# Patient Record
Sex: MALE | Race: WHITE | NOT HISPANIC OR LATINO | Employment: OTHER | URBAN - NONMETROPOLITAN AREA
[De-identification: names, ages, dates, MRNs, and addresses within clinical notes are randomized per-mention and may not be internally consistent; named-entity substitution may affect disease eponyms.]

---

## 2017-01-04 ENCOUNTER — OFFICE VISIT (OUTPATIENT)
Dept: UROLOGY | Facility: CLINIC | Age: 62
End: 2017-01-04

## 2017-01-04 VITALS — HEIGHT: 67 IN | TEMPERATURE: 97.3 F | WEIGHT: 172.8 LBS | BODY MASS INDEX: 27.12 KG/M2

## 2017-01-04 DIAGNOSIS — N21.0 BLADDER CALCULI: ICD-10-CM

## 2017-01-04 DIAGNOSIS — N32.0 BLADDER NECK CONTRACTURE: Primary | ICD-10-CM

## 2017-01-04 PROCEDURE — 99024 POSTOP FOLLOW-UP VISIT: CPT | Performed by: UROLOGY

## 2017-01-04 NOTE — PROGRESS NOTES
Mr. Sanchez is 61 y.o. male    CHIEF COMPLAINT: I am here after my procedure. I am urinating better    HPI  Location: Bladder neck  Quality:  Contracture  Severity: Severe  Duration: 3 months  Timing: Continuous.  Context: Post laser ablation prostate  Modifying factors: Status post transurethral incision bladder neck contracture and removal of bladder calculus  Associated signs or symptoms: Urinary stream has improved.  Some gross hematuria and urgency.      The following portions of the patient's history were reviewed and updated as appropriate: allergies, current medications, past family history, past medical history, past social history, past surgical history and problem list.    Review of Systems   Constitutional: Negative for chills and fever.   Gastrointestinal: Negative for abdominal pain, anal bleeding and blood in stool.   Genitourinary: Positive for urgency. Negative for flank pain and hematuria.         Current Outpatient Prescriptions:   •  acetaminophen (TYLENOL) 500 MG tablet, Take 500 mg by mouth As Needed for mild pain (1-3)., Disp: , Rfl:   •  amLODIPine (NORVASC) 5 MG tablet, Take 5 mg by mouth Daily., Disp: , Rfl:   •  cetirizine (zyrTEC) 10 MG tablet, Take 10 mg by mouth Daily., Disp: , Rfl:   •  omeprazole (priLOSEC) 20 MG capsule, Take 20 mg by mouth Daily., Disp: , Rfl:     Past Medical History   Diagnosis Date   • Arthritis    • Huggins esophagus    • Bladder stone    • Hypertension    • Kidney stones    • Melanoma    • Mitral valve prolapse    • PONV (postoperative nausea and vomiting)        Past Surgical History   Procedure Laterality Date   • Vasectomy     • Cystoscopy bladder stone lithotripsy  2011   • Cystoscopy w/ ureteral stent removal N/A 12/2/2016     Procedure: CYSTOLITHOLAPAXY WITH LASER and TRANURETHRAL INCISION OF BLADDER NECK CONTRACTURE;  Surgeon: Ryan Lewis MD;  Location: [facility] OR;  Service:    • Bladder neck contracture repair N/A 12/2/2016     Procedure: BLADDER  "NECK CONTRACTURE REPAIR;  Surgeon: Ryan Lewis MD;  Location: [facility];  Service:        Social History     Social History   • Marital status: Single     Spouse name: N/A   • Number of children: N/A   • Years of education: N/A     Social History Main Topics   • Smoking status: Never Smoker   • Smokeless tobacco: Never Used   • Alcohol use 12.6 oz/week     21 Cans of beer per week   • Drug use: No   • Sexual activity: Not Asked     Other Topics Concern   • None     Social History Narrative       Family History   Problem Relation Age of Onset   • No Known Problems Father    • No Known Problems Mother    • Colon cancer Maternal Grandfather        Visit Vitals   • Temp 97.3 °F (36.3 °C)   • Ht 67\" (170.2 cm)   • Wt 172 lb 12.8 oz (78.4 kg)   • BMI 27.06 kg/m2       Physical Exam   Constitutional: He is oriented to person, place, and time. He appears well-developed and well-nourished. No distress.   Pulmonary/Chest: Effort normal.   Abdominal: Soft. He exhibits no distension and no mass. There is no tenderness. There is no rebound and no guarding. No hernia.   Neurological: He is alert and oriented to person, place, and time.   Skin: Skin is warm and dry. He is not diaphoretic.   Psychiatric: He has a normal mood and affect.   Vitals reviewed.        Results for orders placed or performed during the hospital encounter of 12/02/16   Tissue Exam   Result Value Ref Range    Case Report       Surgical Pathology Report                         Case: [id number]                                  Authorizing Provider:  Ryan Lewis MD        Collected:           12/02/2016 08:15 AM          Ordering Location:     [facility]  Received:            12/02/2016 09:43 AM          Pathologist:           Belkis Joya MD                                                           Specimen:    Kidney, Bladder stones                                                                     Clinical Information       " "Pre-Op Diagnosis:    Retention of urine.    Post-Op Diagnosis:   Bladder stones.        Final Diagnosis       Urinary bladder, stones, removal :  Stone fragments, gross examination only  Submitted for analysis and report to follow as an addendum      Gross Description       Specimen #1 is received fresh, labeled with the patient's name, date of birth and medical record number and designated \"bladder stones\".  The specimen consists of multiple fragments of yellow-brown, irregularly shaped stones aggregating to 3.5 x 1.3 x 0.4 cm.  The stones are totally submitted for crystallographic calculus analysis, no histology is performed.  JBT/js      Embedded Images         Imaging Results (last 7 days)     ** No results found for the last 168 hours. **              Assessment and Plan  Ryan was seen today for bladder stone.    Diagnoses and all orders for this visit:    Bladder neck contracture    Bladder calculi  Needs a cystoscopy in two months to rule out repeat bladder neck contracture.   Improved stream.  High risk of recurrence.  We will need to follow uroflow and do serial cystoscopy.  He may ultimately require once daily catheterizations to keep himself open.      Ryan Lewis MD  01/04/17  9:41 AM    EMR Dragon/Transcription disclaimer:  Much of this encounter note is an electronic transcription/translation of spoken language to printed text. The electronic translation of spoken language may permit erroneous, or at times, nonsensical words or phrases to be inadvertently transcribed; although I have reviewed the note for such errors, some may still exist.       Cc: Dr. Marrero  "

## 2017-01-04 NOTE — MR AVS SNAPSHOT
Ryanmaximino Sanchez   2017 9:00 AM   Office Visit    Dept Phone:  [phone]   Encounter #:  [id number]    Provider:  Ryan Lewis MD   Department:  [facility] UROLOGY                Your Full Care Plan              Your Updated Medication List          This list is accurate as of: 17  9:45 AM.  Always use your most recent med list.                amLODIPine 5 MG tablet   Commonly known as:  NORVASC       cetirizine 10 MG tablet   Commonly known as:  zyrTEC       omeprazole 20 MG capsule   Commonly known as:  priLOSEC       TYLENOL 500 MG tablet   Generic drug:  acetaminophen               You Were Diagnosed With        Codes Comments    Bladder neck contracture    -  Primary ICD-10-CM: N32.0  ICD-9-CM: 596.0     Bladder calculi     ICD-10-CM: N21.0  ICD-9-CM: 594.1       Instructions     None    Patient Instructions History      Upcoming Appointments     Visit Type Date Time Department    POST-OP 2017  9:00 AM [facility]    IN OFFICE PROCEDURE 3/6/2017  9:20 AM [facility]      [facility] Signup     [facility] allows you to send messages to your doctor, view your test results, renew your prescriptions, schedule appointments, and more. To sign up, go to [employer] and click on the Sign Up Now link in the New User? box. Enter your [facility] Activation Code exactly as it appears below along with the last four digits of your Social Security Number and your Date of Birth () to complete the sign-up process. If you do not sign up before the expiration date, you must request a new code.    [facility] Activation Code: [email or web address]  Expires: 2017  9:44 AM    If you have questions, you can email [email or web address] or call [phone] to talk to our [facility] staff. Remember, [facility] is NOT to be used for urgent needs. For medical emergencies, dial 911.               Other Info from Your Visit           Your Appointments   "   Mar 06, 2017  9:20 AM CST   IN OFFICE PROCEDURE with Ryan Lewis MD   [facility] UROLOGY (--)    [address] [place] [id number] [phone]           Bring medication list, test results, and radiology films that apply.              Allergies     No Known Allergies      Reason for Visit     bladder stone           Vital Signs     Temperature Height Weight Body Mass Index Smoking Status       97.3 °F (36.3 °C) 67\" (170.2 cm) 172 lb 12.8 oz (78.4 kg) 27.06 kg/m2 Never Smoker       Problems and Diagnoses Noted     Bladder neck contracture    -  Primary    Bladder calculi            "

## 2017-01-04 NOTE — LETTER
January 7, 2017     Elmo Marrero MD  [address] [place] [address]    Patient: Ryan Sanchez   YOB: 1955   Date of Visit: 1/4/2017     Dear Dr. Janes MD:    Thank you for referring Ryan Sanchez to me for evaluation. Below are the relevant portions of my assessment and plan of care.    If you have questions, please do not hesitate to call me. I look forward to following Ryan along with you.         Sincerely,        Ryan Lewis MD        CC: No Recipients    Progress Notes:    Mr. Sanchez is 61 y.o. male    CHIEF COMPLAINT: I am here after my procedure. I am urinating better    HPI  Location: Bladder neck  Quality:  Contracture  Severity: Severe  Duration: 3 months  Timing: Continuous.  Context: Post laser ablation prostate  Modifying factors: Status post transurethral incision bladder neck contracture and removal of bladder calculus  Associated signs or symptoms: Urinary stream has improved.  Some gross hematuria and urgency.      The following portions of the patient's history were reviewed and updated as appropriate: allergies, current medications, past family history, past medical history, past social history, past surgical history and problem list.    Review of Systems   Constitutional: Negative for chills and fever.   Gastrointestinal: Negative for abdominal pain, anal bleeding and blood in stool.   Genitourinary: Positive for urgency. Negative for flank pain and hematuria.         Current Outpatient Prescriptions:   •  acetaminophen (TYLENOL) 500 MG tablet, Take 500 mg by mouth As Needed for mild pain (1-3)., Disp: , Rfl:   •  amLODIPine (NORVASC) 5 MG tablet, Take 5 mg by mouth Daily., Disp: , Rfl:   •  cetirizine (zyrTEC) 10 MG tablet, Take 10 mg by mouth Daily., Disp: , Rfl:   •  omeprazole (priLOSEC) 20 MG capsule, Take 20 mg by mouth Daily., Disp: , Rfl:     Past Medical History   Diagnosis Date   • Arthritis    • Huggins esophagus    • Bladder stone    • Hypertension    •  "Kidney stones    • Melanoma    • Mitral valve prolapse    • PONV (postoperative nausea and vomiting)        Past Surgical History   Procedure Laterality Date   • Vasectomy     • Cystoscopy bladder stone lithotripsy  2011   • Cystoscopy w/ ureteral stent removal N/A 12/2/2016     Procedure: CYSTOLITHOLAPAXY WITH LASER and TRANURETHRAL INCISION OF BLADDER NECK CONTRACTURE;  Surgeon: Ryan Lewis MD;  Location:  [facility] OR;  Service:    • Bladder neck contracture repair N/A 12/2/2016     Procedure: BLADDER NECK CONTRACTURE REPAIR;  Surgeon: Ryan Lewis MD;  Location:  [facility] OR;  Service:        Social History     Social History   • Marital status: Single     Spouse name: N/A   • Number of children: N/A   • Years of education: N/A     Social History Main Topics   • Smoking status: Never Smoker   • Smokeless tobacco: Never Used   • Alcohol use 12.6 oz/week     21 Cans of beer per week   • Drug use: No   • Sexual activity: Not Asked     Other Topics Concern   • None     Social History Narrative       Family History   Problem Relation Age of Onset   • No Known Problems Father    • No Known Problems Mother    • Colon cancer Maternal Grandfather        Visit Vitals   • Temp 97.3 °F (36.3 °C)   • Ht 67\" (170.2 cm)   • Wt 172 lb 12.8 oz (78.4 kg)   • BMI 27.06 kg/m2       Physical Exam   Constitutional: He is oriented to person, place, and time. He appears well-developed and well-nourished. No distress.   Pulmonary/Chest: Effort normal.   Abdominal: Soft. He exhibits no distension and no mass. There is no tenderness. There is no rebound and no guarding. No hernia.   Neurological: He is alert and oriented to person, place, and time.   Skin: Skin is warm and dry. He is not diaphoretic.   Psychiatric: He has a normal mood and affect.   Vitals reviewed.        Results for orders placed or performed during the hospital encounter of 12/02/16   Tissue Exam   Result Value Ref Range    Case Report       Surgical Pathology Report " "                        Case: [id number]                                  Authorizing Provider:  Ryan Lewis MD        Collected:           12/02/2016 08:15 AM          Ordering Location:     [facility] OR  Received:            12/02/2016 09:43 AM          Pathologist:           Belkis Joya MD                                                           Specimen:    Kidney, Bladder stones                                                                     Clinical Information       Pre-Op Diagnosis:    Retention of urine.    Post-Op Diagnosis:   Bladder stones.        Final Diagnosis       Urinary bladder, stones, removal :  Stone fragments, gross examination only  Submitted for analysis and report to follow as an addendum      Gross Description       Specimen #1 is received fresh, labeled with the patient's name, date of birth and medical record number and designated \"bladder stones\".  The specimen consists of multiple fragments of yellow-brown, irregularly shaped stones aggregating to 3.5 x 1.3 x 0.4 cm.  The stones are totally submitted for crystallographic calculus analysis, no histology is performed.  JBT/js      Embedded Images         Imaging Results (last 7 days)     ** No results found for the last 168 hours. **              Assessment and Plan  Ryan was seen today for bladder stone.    Diagnoses and all orders for this visit:    Bladder neck contracture    Bladder calculi  Needs a cystoscopy in two months to rule out repeat bladder neck contracture.   Improved stream.  High risk of recurrence.  We will need to follow uroflow and do serial cystoscopy.  He may ultimately require once daily catheterizations to keep himself open.      Ryan Lewis MD  01/04/17  9:41 AM    EMR Dragon/Transcription disclaimer:  Much of this encounter note is an electronic transcription/translation of spoken language to printed text. The electronic translation of spoken language may permit erroneous, or " at times, nonsensical words or phrases to be inadvertently transcribed; although I have reviewed the note for such errors, some may still exist.       Cc: Dr. Marrero

## 2017-04-06 ENCOUNTER — PROCEDURE VISIT (OUTPATIENT)
Dept: UROLOGY | Facility: CLINIC | Age: 62
End: 2017-04-06

## 2017-04-06 VITALS — TEMPERATURE: 96 F | BODY MASS INDEX: 26.37 KG/M2 | HEIGHT: 67 IN | WEIGHT: 168 LBS

## 2017-04-06 DIAGNOSIS — N32.0 BLADDER NECK CONTRACTURE: Primary | ICD-10-CM

## 2017-04-06 LAB
BILIRUB BLD-MCNC: NEGATIVE MG/DL
CLARITY, POC: CLEAR
COLOR UR: YELLOW
GLUCOSE UR STRIP-MCNC: NEGATIVE MG/DL
KETONES UR QL: NEGATIVE
LEUKOCYTE EST, POC: NEGATIVE
NITRITE UR-MCNC: NEGATIVE MG/ML
PH UR: 7 [PH] (ref 5–8)
PROT UR STRIP-MCNC: NEGATIVE MG/DL
RBC # UR STRIP: NEGATIVE /UL
SP GR UR: 1.02 (ref 1–1.03)
UROBILINOGEN UR QL: NORMAL

## 2017-04-06 PROCEDURE — 52000 CYSTOURETHROSCOPY: CPT | Performed by: UROLOGY

## 2017-04-06 PROCEDURE — 81003 URINALYSIS AUTO W/O SCOPE: CPT | Performed by: UROLOGY

## 2017-04-06 NOTE — PROGRESS NOTES
CC: I am here for the doctor to look at my bladder    Cystoscopy procedure note  Pre- operative diagnosis:  Bladder neck contracture s/p laser ablation of prostate     Post operative diagnosis:  Same    Procedure:  The patient was prepped and draped in a normal sterile fashion.  The urethra was anesthetized with 2% lidocaine jelly.  A flexible cystoscope was introduced per urethra.      Urethra:  bulbar urethral stricture    Bladder:  Normal mucosa, Bladder stone, moderate trabeculation and bladder neck contracture has healed nicely    Ureteral orifices:  Normal position bilaterally and Clear efflux bilaterally    Prostate:  Well resected prostatic urethra     Patient tolerated the procedure well    Complications: none    Blood loss: minimal    Diagnoses and all orders for this visit:    Bladder neck contracture  -     POC Urinalysis Dipstick, Automated  -     lidocaine (XYLOCAINE) 2 % jelly; Apply  topically As Needed for Mild Pain (1-3).        Follow up:    Routine follow up

## 2017-09-05 ENCOUNTER — OFFICE VISIT (OUTPATIENT)
Dept: GASTROENTEROLOGY | Facility: CLINIC | Age: 62
End: 2017-09-05

## 2017-09-05 VITALS
SYSTOLIC BLOOD PRESSURE: 140 MMHG | DIASTOLIC BLOOD PRESSURE: 86 MMHG | BODY MASS INDEX: 26.06 KG/M2 | WEIGHT: 166 LBS | OXYGEN SATURATION: 100 % | HEIGHT: 67 IN | HEART RATE: 72 BPM

## 2017-09-05 DIAGNOSIS — R09.89 GLOBUS SENSATION: Primary | ICD-10-CM

## 2017-09-05 DIAGNOSIS — K21.00 GASTROESOPHAGEAL REFLUX DISEASE WITH ESOPHAGITIS: ICD-10-CM

## 2017-09-05 DIAGNOSIS — R13.10 DIFFICULTY SWALLOWING SOLIDS: ICD-10-CM

## 2017-09-05 DIAGNOSIS — I10 HTN (HYPERTENSION), BENIGN: ICD-10-CM

## 2017-09-05 DIAGNOSIS — E11.9 CONTROLLED TYPE 2 DIABETES MELLITUS WITHOUT COMPLICATION, WITHOUT LONG-TERM CURRENT USE OF INSULIN (HCC): ICD-10-CM

## 2017-09-05 PROBLEM — R09.A2 GLOBUS SENSATION: Status: ACTIVE | Noted: 2017-09-05

## 2017-09-05 PROBLEM — K21.9 GASTROESOPHAGEAL REFLUX DISEASE: Status: ACTIVE | Noted: 2017-09-05

## 2017-09-05 PROCEDURE — 99214 OFFICE O/P EST MOD 30 MIN: CPT | Performed by: CLINICAL NURSE SPECIALIST

## 2017-09-05 RX ORDER — PANTOPRAZOLE SODIUM 40 MG/1
40 TABLET, DELAYED RELEASE ORAL DAILY
COMMUNITY
End: 2017-09-05 | Stop reason: ALTCHOICE

## 2017-09-05 RX ORDER — LISINOPRIL 2.5 MG/1
2.5 TABLET ORAL NIGHTLY
Status: ON HOLD | COMMUNITY
End: 2020-12-29 | Stop reason: SDUPTHER

## 2017-09-05 RX ORDER — DEXLANSOPRAZOLE 30 MG/1
60 CAPSULE, DELAYED RELEASE ORAL DAILY
Qty: 90 CAPSULE | Refills: 4 | Status: SHIPPED | OUTPATIENT
Start: 2017-09-05 | End: 2017-11-15

## 2017-09-05 NOTE — PROGRESS NOTES
Ryan Sanchez  1955 9/5/2017  Chief Complaint   Patient presents with   • Endo Recall   • Difficulty Swallowing     Subjective   HPI  Ryan Sanchez is a 62 y.o. male who presents with a complaint of ongoing persistent globus sensation like there is something in the back of his throat constant. It has been persistent for months. No nausea or vomiting. No melena. No BRBPR. He has reflux ongoing for years unstable with protonix he cannot take daily due to diarrhea. He does have regurgitation and some burning. This is daily. Moderate to severe at times. Certain foods may trigger it. No alleviating He has taken nexium in the past no reflief.  He is up to date on his colonoscopy.   Past Medical History:   Diagnosis Date   • Arthritis    • Huggins esophagus    • Bladder stone    • Hx of colonic polyps    • Hypertension    • Kidney stones    • Melanoma    • Mitral valve prolapse    • PONV (postoperative nausea and vomiting)      Past Surgical History:   Procedure Laterality Date   • BLADDER NECK CONTRACTURE REPAIR N/A 12/2/2016    Procedure: BLADDER NECK CONTRACTURE REPAIR;  Surgeon: Ryan Lewis MD;  Location:  [facility] OR;  Service:    • COLONOSCOPY W/ POLYPECTOMY  09/10/2015    Tubular adenoma at 30 cm repeat exam in 5 years   • CYSTOSCOPY BLADDER STONE LITHOTRIPSY  2011   • CYSTOSCOPY W/ URETERAL STENT REMOVAL N/A 12/2/2016    Procedure: CYSTOLITHOLAPAXY WITH LASER and TRANURETHRAL INCISION OF BLADDER NECK CONTRACTURE;  Surgeon: Ryan Lewis MD;  Location:  [facility] OR;  Service:    • ENDOSCOPY  07/14/2014    HH, Reflux esophagitis   • VASECTOMY       Outpatient Prescriptions Marked as Taking for the 9/5/17 encounter (Office Visit) with ARACELI Pabon   Medication Sig Dispense Refill   • acetaminophen (TYLENOL) 500 MG tablet Take 500 mg by mouth As Needed for mild pain (1-3).     • amLODIPine (NORVASC) 5 MG tablet Take 5 mg by mouth Daily.     • cetirizine (zyrTEC) 10 MG tablet Take 10 mg by mouth  Daily.     • lisinopril (PRINIVIL,ZESTRIL) 2.5 MG tablet Take 2.5 mg by mouth Daily.     • metFORMIN (GLUCOPHAGE) 500 MG tablet Take 500 mg by mouth 2 (Two) Times a Day With Meals.     • [DISCONTINUED] pantoprazole (PROTONIX) 40 MG EC tablet Take 40 mg by mouth Daily.       Current Facility-Administered Medications for the 9/5/17 encounter (Office Visit) with ARACELI Pabon   Medication Dose Route Frequency Provider Last Rate Last Dose   • lidocaine (XYLOCAINE) 2 % jelly   Topical PRN Ryan Lewis MD         No Known Allergies  Social History     Social History   • Marital status: Single     Spouse name: N/A   • Number of children: N/A   • Years of education: N/A     Occupational History   • Not on file.     Social History Main Topics   • Smoking status: Never Smoker   • Smokeless tobacco: Never Used   • Alcohol use 12.6 oz/week     21 Cans of beer per week   • Drug use: No   • Sexual activity: Not on file     Other Topics Concern   • Not on file     Social History Narrative     Family History   Problem Relation Age of Onset   • No Known Problems Father    • No Known Problems Mother    • Colon cancer Maternal Grandfather      Health Maintenance   Topic Date Due   • PNEUMOCOCCAL VACCINE (19-64 MEDIUM RISK) (1 of 1 - PPSV23) 08/07/1973   • TDAP/TD VACCINES (1 - Tdap) 08/07/1973   • HEPATITIS C SCREENING  04/06/2017   • COLONOSCOPY  04/06/2017   • ZOSTER VACCINE  04/06/2017   • INFLUENZA VACCINE  09/01/2017   • DIABETIC FOOT EXAM  09/05/2017   • HEMOGLOBIN A1C  09/05/2017   • DIABETIC EYE EXAM  09/05/2017   • URINE MICROALBUMIN  09/05/2017     Review of Systems   Constitutional: Negative for activity change, appetite change, chills, diaphoresis, fatigue, fever and unexpected weight change.   HENT: Negative for ear pain, hearing loss, mouth sores, sore throat, trouble swallowing and voice change.    Eyes: Negative.    Respiratory: Negative for cough, choking, shortness of breath and wheezing.   "  Cardiovascular: Negative for chest pain and palpitations.   Gastrointestinal: Negative for abdominal pain, blood in stool, constipation, diarrhea, nausea and vomiting.        GERD and globus sensation   Endocrine: Negative for cold intolerance and heat intolerance.   Genitourinary: Negative for decreased urine volume, dysuria, frequency, hematuria and urgency.   Musculoskeletal: Negative for back pain, gait problem and myalgias.   Skin: Negative for color change, pallor and rash.   Allergic/Immunologic: Negative for food allergies and immunocompromised state.   Neurological: Negative for dizziness, tremors, seizures, syncope, weakness, light-headedness, numbness and headaches.   Hematological: Negative for adenopathy. Does not bruise/bleed easily.   Psychiatric/Behavioral: Negative for agitation and confusion. The patient is not nervous/anxious.    All other systems reviewed and are negative.    Objective   Vitals:    09/05/17 1021   BP: 140/86   BP Location: Left arm   Patient Position: Sitting   Cuff Size: Adult   Pulse: 72   SpO2: 100%   Weight: 166 lb (75.3 kg)   Height: 67\" (170.2 cm)     Body mass index is 26 kg/(m^2).  Physical Exam   Constitutional: He is oriented to person, place, and time. He appears well-developed and well-nourished.   HENT:   Head: Normocephalic and atraumatic.   Eyes: Pupils are equal, round, and reactive to light.   Neck: Normal range of motion. Neck supple. No tracheal deviation present.   Cardiovascular: Normal rate, regular rhythm and normal heart sounds.  Exam reveals no gallop and no friction rub.    No murmur heard.  Pulmonary/Chest: Effort normal and breath sounds normal. No respiratory distress. He has no wheezes. He has no rales. He exhibits no tenderness.   Abdominal: Soft. Bowel sounds are normal. He exhibits no distension. There is no hepatosplenomegaly. There is no tenderness. There is no rigidity, no rebound and no guarding.   Musculoskeletal: Normal range of motion. He " exhibits no edema, tenderness or deformity.   Neurological: He is alert and oriented to person, place, and time. He has normal reflexes.   Skin: Skin is warm and dry. No rash noted. No pallor.   Psychiatric: He has a normal mood and affect. His behavior is normal. Judgment and thought content normal.     Assessment/Plan   Ryan was seen today for endo recall and difficulty swallowing.    Diagnoses and all orders for this visit:    Globus sensation    Difficulty swallowing solids    Gastroesophageal reflux disease with esophagitis  Comments:  unstable with Protonix  Orders:  -     dexlansoprazole (DEXILANT) 30 MG capsule; Take 2 capsules by mouth Daily. Give 60 mg capsule #90  -     Case Request; Standing  -     Implement Anesthesia Orders Day of Procedure; Standing  -     Obtain Informed Consent; Standing  -     Case Request    HTN (hypertension), benign    Controlled type 2 diabetes mellitus without complication, without long-term current use of insulin      ESOPHAGOGASTRODUODENOSCOPY WITH ANESTHESIA (N/A)  EMR Dragon/transcription disclaimer: Much of this encounter note is electronic transcription/translation of spoken language to printed text. The electronic translation of spoken language may be erroneous, or at times, nonsensical words or phrases may be inadvertently transcribed. Although I have reviewed the note for such errors, some may still exist.  Body mass index is 26 kg/(m^2).  Return if symptoms worsen or fail to improve.  There are no Patient Instructions on file for this visit.    All risks, benefits, alternatives, and indications of colonoscopy and/or Endoscopy procedure have been discussed with the patient. Risks to include perforation of the colon requiring possible surgery or colostomy, risk of bleeding from biopsies or removal of colon tissue, possibility of missing a colon polyp or cancer, or adverse drug reaction.  Benefits to include the diagnosis and management of disease of the colon and  rectum. Alternatives to include barium enema, radiographic evaluation, lab testing or no intervention. Pt verbalizes understanding and agrees.

## 2017-09-06 PROBLEM — K21.00 GASTROESOPHAGEAL REFLUX DISEASE WITH ESOPHAGITIS: Status: ACTIVE | Noted: 2017-09-06

## 2017-10-05 ENCOUNTER — ANESTHESIA (OUTPATIENT)
Dept: GASTROENTEROLOGY | Facility: HOSPITAL | Age: 62
End: 2017-10-05

## 2017-10-05 ENCOUNTER — HOSPITAL ENCOUNTER (OUTPATIENT)
Facility: HOSPITAL | Age: 62
Setting detail: HOSPITAL OUTPATIENT SURGERY
Discharge: HOME | End: 2017-10-05
Attending: INTERNAL MEDICINE | Admitting: INTERNAL MEDICINE

## 2017-10-05 ENCOUNTER — ANESTHESIA EVENT (OUTPATIENT)
Dept: GASTROENTEROLOGY | Facility: HOSPITAL | Age: 62
End: 2017-10-05

## 2017-10-05 ENCOUNTER — PREP FOR SURGERY (OUTPATIENT)
Dept: GASTROENTEROLOGY | Facility: CLINIC | Age: 62
End: 2017-10-05

## 2017-10-05 VITALS
HEIGHT: 67 IN | DIASTOLIC BLOOD PRESSURE: 81 MMHG | TEMPERATURE: 98.1 F | SYSTOLIC BLOOD PRESSURE: 120 MMHG | BODY MASS INDEX: 25.43 KG/M2 | OXYGEN SATURATION: 98 % | HEART RATE: 58 BPM | RESPIRATION RATE: 15 BRPM | WEIGHT: 162 LBS

## 2017-10-05 DIAGNOSIS — K21.00 GASTROESOPHAGEAL REFLUX DISEASE WITH ESOPHAGITIS: ICD-10-CM

## 2017-10-05 DIAGNOSIS — K20.90 ESOPHAGITIS: Primary | ICD-10-CM

## 2017-10-05 LAB — GLUCOSE BLDC GLUCOMTR-MCNC: 105 MG/DL (ref 70–130)

## 2017-10-05 PROCEDURE — 25010000002 PROPOFOL 10 MG/ML EMULSION: Performed by: NURSE ANESTHETIST, CERTIFIED REGISTERED

## 2017-10-05 PROCEDURE — 43235 EGD DIAGNOSTIC BRUSH WASH: CPT | Performed by: INTERNAL MEDICINE

## 2017-10-05 PROCEDURE — 82962 GLUCOSE BLOOD TEST: CPT

## 2017-10-05 RX ORDER — PROPOFOL 10 MG/ML
VIAL (ML) INTRAVENOUS AS NEEDED
Status: DISCONTINUED | OUTPATIENT
Start: 2017-10-05 | End: 2017-10-05 | Stop reason: SURG

## 2017-10-05 RX ORDER — PANTOPRAZOLE SODIUM 40 MG/1
40 TABLET, DELAYED RELEASE ORAL 2 TIMES DAILY
Qty: 180 TABLET | Refills: 3 | Status: ON HOLD | OUTPATIENT
Start: 2017-10-05 | End: 2020-12-26

## 2017-10-05 RX ORDER — SODIUM CHLORIDE 9 MG/ML
500 INJECTION, SOLUTION INTRAVENOUS CONTINUOUS PRN
Status: DISCONTINUED | OUTPATIENT
Start: 2017-10-05 | End: 2017-10-05 | Stop reason: HOSPADM

## 2017-10-05 RX ORDER — SODIUM CHLORIDE 0.9 % (FLUSH) 0.9 %
3 SYRINGE (ML) INJECTION AS NEEDED
Status: DISCONTINUED | OUTPATIENT
Start: 2017-10-05 | End: 2017-10-05 | Stop reason: HOSPADM

## 2017-10-05 RX ORDER — LIDOCAINE HYDROCHLORIDE 20 MG/ML
INJECTION, SOLUTION INFILTRATION; PERINEURAL AS NEEDED
Status: DISCONTINUED | OUTPATIENT
Start: 2017-10-05 | End: 2017-10-05 | Stop reason: SURG

## 2017-10-05 RX ADMIN — SODIUM CHLORIDE: 0.9 INJECTION, SOLUTION INTRAVENOUS at 13:15

## 2017-10-05 RX ADMIN — LIDOCAINE HYDROCHLORIDE 40 MG: 20 INJECTION, SOLUTION INFILTRATION; PERINEURAL at 13:19

## 2017-10-05 RX ADMIN — PROPOFOL 50 MG: 10 INJECTION, EMULSION INTRAVENOUS at 13:21

## 2017-10-05 RX ADMIN — PROPOFOL 50 MG: 10 INJECTION, EMULSION INTRAVENOUS at 13:19

## 2017-10-05 NOTE — PLAN OF CARE
Problem: Patient Care Overview (Adult)  Goal: Plan of Care Review  Outcome: Outcome(s) achieved Date Met:  10/05/17    10/05/17 1338   Patient Care Overview   Progress no change   Outcome Evaluation   Outcome Summary/Follow up Plan meets discharge criteria   Coping/Psychosocial Response Interventions   Plan Of Care Reviewed With patient;spouse         Problem: GI Endoscopy (Adult)  Goal: Signs and Symptoms of Listed Potential Problems Will be Absent or Manageable (GI Endoscopy)  Outcome: Outcome(s) achieved Date Met:  10/05/17    10/05/17 1338   GI Endoscopy   Problems Assessed (GI Endoscopy) all   Problems Present (GI Endoscopy) none

## 2017-10-05 NOTE — PLAN OF CARE
Problem: Patient Care Overview (Adult)  Goal: Plan of Care Review  Outcome: Ongoing (interventions implemented as appropriate)    10/05/17 1322   Patient Care Overview   Progress improving   Outcome Evaluation   Outcome Summary/Follow up Plan no noted problems

## 2017-10-05 NOTE — INTERVAL H&P NOTE
H&P reviewed. The patient was examined and there are no changes to the H&P.    The following major R/B/A were discussed with the patient, however the list is not all inclusive . Risk:  Bleeding (immediate and delayed), perforation (rupture or tear), reaction to medication, missed lesion/cancer, pain during the procedure, infection, need for surgery, need for ostomy, need for mechanical ventilation (breathing machine), death.  Benefits: removal of polyp/tissue, burn/clip/or inject to stop bleeding, removal of foreign body, dilate any stricture.  Alternatives: Xray or CT, surgery, do nothing with associated risk   The patient was given time to ask question and received explanation, and agrees to proceed as per History and Physical.   No guarantee given or expressed.

## 2017-10-05 NOTE — ANESTHESIA PREPROCEDURE EVALUATION
Anesthesia Evaluation     Patient summary reviewed and Nursing notes reviewed   no history of anesthetic complications:  NPO Solid Status: > 8 hours  NPO Liquid Status: > 8 hours     Airway   Mallampati: I  TM distance: >3 FB  Neck ROM: full  no difficulty expected  Dental      Pulmonary     breath sounds clear to auscultation  (-) asthma, sleep apnea, not a smoker  Cardiovascular   Exercise tolerance: good (4-7 METS)    ECG reviewed  Rhythm: regular  Rate: normal    (+) hypertension, valvular problems/murmurs MVP,   (-) CAD, angina      Neuro/Psych  (-) seizures, TIA, CVA  GI/Hepatic/Renal/Endo    (+)  GERD, diabetes mellitus type 2 well controlled,   (-) liver disease, no renal disease    ROS Comment: H/o esophageal stricture    Musculoskeletal     Abdominal    Substance History      OB/GYN          Other   (+) arthritis                                     Anesthesia Plan    ASA 2     general     intravenous induction   Anesthetic plan and risks discussed with patient.

## 2017-10-05 NOTE — ANESTHESIA POSTPROCEDURE EVALUATION
"Patient: Ryan Sanchez    Procedure Summary     Date Anesthesia Start Anesthesia Stop Room / Location    10/05/17 1315 1322 [facility] ENDOSCOPY 5 / [facility]       Procedure Diagnosis Surgeon Provider    ESOPHAGOGASTRODUODENOSCOPY WITH ANESTHESIA (N/A Esophagus) Gastroesophageal reflux disease with esophagitis  (Gastroesophageal reflux disease with esophagitis [K21.0]) MD Juan Daniel Hall CRNA          Anesthesia Type: general  Last vitals  BP        Temp        Pulse       Resp        SpO2          Post Anesthesia Care and Evaluation    Patient location during evaluation: PACU  Patient participation: complete - patient participated  Level of consciousness: awake and alert  Pain score: 0  Pain management: adequate  Airway patency: patent  Anesthetic complications: No anesthetic complications  PONV Status: none  Cardiovascular status: hemodynamically stable and acceptable  Respiratory status: acceptable and unassisted  Hydration status: acceptable    Comments: Blood pressure 133/93, pulse 72, temperature 98.1 °F (36.7 °C), temperature source Temporal Artery , resp. rate 18, height 67\" (170.2 cm), weight 162 lb (73.5 kg), SpO2 99 %.        "

## 2017-10-06 PROBLEM — K20.90 ESOPHAGITIS: Status: ACTIVE | Noted: 2017-10-06

## 2017-11-14 ENCOUNTER — TELEPHONE (OUTPATIENT)
Dept: PODIATRY | Facility: CLINIC | Age: 62
End: 2017-11-14

## 2017-11-14 NOTE — TELEPHONE ENCOUNTER
Left message for Mr. Sanchez reminding him of his appointment tomorrow at 10 am and advised him if he has any questions or needed to reschedule for any reason to please call the office at [phone].

## 2017-11-15 ENCOUNTER — OFFICE VISIT (OUTPATIENT)
Dept: PODIATRY | Facility: CLINIC | Age: 62
End: 2017-11-15

## 2017-11-15 VITALS
HEART RATE: 70 BPM | HEIGHT: 67 IN | BODY MASS INDEX: 25.74 KG/M2 | SYSTOLIC BLOOD PRESSURE: 148 MMHG | DIASTOLIC BLOOD PRESSURE: 96 MMHG | OXYGEN SATURATION: 98 % | WEIGHT: 164 LBS

## 2017-11-15 DIAGNOSIS — E11.9 CONTROLLED TYPE 2 DIABETES MELLITUS WITHOUT COMPLICATION, WITHOUT LONG-TERM CURRENT USE OF INSULIN (HCC): Primary | ICD-10-CM

## 2017-11-15 PROCEDURE — 99203 OFFICE O/P NEW LOW 30 MIN: CPT | Performed by: PODIATRIST

## 2017-11-15 NOTE — PROGRESS NOTES
[facility] - PODIATRY    Today's Date: 11/15/17    Patient Name: Ryan Sanchez  MRN: [id number]  CSN: [id number]  PCP: Elmo Marrero MD  Referring Provider: Elmo Marrero MD    SUBJECTIVE     Chief Complaint   Patient presents with   • Right Foot - Numbness     Patient denies any foot pain today. He is complaining of numbness in the right foot x 4 months. PCP 3 weeks.    • Diabetes     BG was 240 last Wednesday. Patient states he checks BG once weekly.      HPI: Ryan Sanchez, a 62 y.o.male, comes to clinic as a(n) new patient presenting for diabetic foot exam. Patient has h/o arthritis, akers's esophagus, DM, HTN, kidney stones, melanoma, mitral valve prolapse. States that he was diagnosed with DM last year. He feels numbness in his right foot but also relates sciatic pain which is treated by chiropractor. Denies wounds or sores. Relates that 30 years ago he fell from a roof 30 feet and crushed his right foot. Patient is NIDDM with last stated BG level of 240mg/dl. Denies pain today. Denies previous treatment. Denies any constitutional symptoms. No other pedal complaints at this time.    Past Medical History:   Diagnosis Date   • Arthritis    • Akers esophagus    • Bladder stone    • Diabetes mellitus    • Hx of colonic polyps    • Hypertension    • Kidney stones    • Melanoma    • Mitral valve prolapse    • PONV (postoperative nausea and vomiting)      Past Surgical History:   Procedure Laterality Date   • BLADDER NECK CONTRACTURE REPAIR N/A 12/2/2016    Procedure: BLADDER NECK CONTRACTURE REPAIR;  Surgeon: Ryan Lewis MD;  Location: [facility];  Service:    • COLONOSCOPY W/ POLYPECTOMY  09/10/2015    Tubular adenoma at 30 cm repeat exam in 5 years   • CYSTOSCOPY BLADDER STONE LITHOTRIPSY  2011   • CYSTOSCOPY W/ URETERAL STENT REMOVAL N/A 12/2/2016    Procedure: CYSTOLITHOLAPAXY WITH LASER and TRANURETHRAL INCISION OF BLADDER NECK CONTRACTURE;  Surgeon: Ryan Lewis MD;   Location: [facility] OR;  Service:    • ENDOSCOPY  07/14/2014    HH, Reflux esophagitis   • ENDOSCOPY N/A 10/5/2017    Procedure: ESOPHAGOGASTRODUODENOSCOPY WITH ANESTHESIA;  Surgeon: Joshua Zuleta MD;  Location: [facility] ENDOSCOPY;  Service:    • VASECTOMY       Family History   Problem Relation Age of Onset   • No Known Problems Father    • No Known Problems Mother    • Colon cancer Maternal Grandfather      Social History     Social History   • Marital status: Single     Spouse name: N/A   • Number of children: N/A   • Years of education: N/A     Occupational History   • Not on file.     Social History Main Topics   • Smoking status: Never Smoker   • Smokeless tobacco: Never Used   • Alcohol use Yes      Comment: daily   • Drug use: No   • Sexual activity: Defer     Other Topics Concern   • Not on file     Social History Narrative     No Known Allergies  Current Outpatient Prescriptions   Medication Sig Dispense Refill   • acetaminophen (TYLENOL) 500 MG tablet Take 500 mg by mouth As Needed for mild pain (1-3).     • amLODIPine (NORVASC) 5 MG tablet Take 5 mg by mouth Daily.     • lisinopril (PRINIVIL,ZESTRIL) 2.5 MG tablet Take 2.5 mg by mouth Daily.     • loratadine-pseudoephedrine (CLARITIN-D 24 HOUR)  MG per 24 hr tablet Take 1 tablet by mouth Daily.     • metFORMIN (GLUCOPHAGE) 500 MG tablet Take 500 mg by mouth 2 (Two) Times a Day With Meals.     • pantoprazole (PROTONIX) 40 MG EC tablet Take 1 tablet by mouth 2 (Two) Times a Day. 180 tablet 3     Current Facility-Administered Medications   Medication Dose Route Frequency Provider Last Rate Last Dose   • lidocaine (XYLOCAINE) 2 % jelly   Topical PRN Ryan Lewis MD         Review of Systems   Constitutional: Negative for chills and fever.   HENT: Negative for congestion.    Respiratory: Negative for shortness of breath.    Cardiovascular: Negative for chest pain and leg swelling.   Gastrointestinal: Negative for constipation, diarrhea, nausea and  vomiting.   Musculoskeletal:        Foot pain   Skin: Negative for wound.   Neurological: Positive for numbness.       OBJECTIVE     Vitals:    11/15/17 1013   BP: 148/96   Pulse: 70   SpO2: 98%       PHYSICAL EXAM  GEN:   A&Ox3, NAD. Pt presents to clinic ambulating without assistance and wearing Casual Shoes.      NEURO:   Protective sensation intact to 10/10 sites Right foot, 10/10 site Left foot using [place] monofilament  Light touch sensation diminished  No Tinel's or [place] sign.    VASC:  Skin temperature Warm to Warm proximal to distal angie  DP pulses 2/4 Right, 2/4 Left  PT pulses 2/4 Right, 2/4 Left  CFT 3 sec angie  Pedal hair growth present  no edema noted angie  Varicosities absent angie    MUSC/SKEL:  Muscle Strength Right foot Dorsiflexors 5/5, Plantarflexors 5/5, Evertors 5/5, Invertors 5/5  Muscle Strength Left foot Dorsiflexors 5/5, Plantarflexors 5/5, Evertors 5/5, Invertors 5/5  ROM of the 1st MTP is full without pain or crepitus  ROM of the MTJ is full without pain or crepitus    ROM of the STJ is full without pain or crepitus    ROM of the ankle joint is full without pain or crepitus    No POP during exam  Rectus foot type   Gait pattern: Normal  No gross pedal musculoskeletal deformities noted.     DERM:  Pedal nails x10 are within normal limits of length and thickness  Webspaces are Clean, Dry, and Intact  Skin is normal in turgor and texture with no open wounds or sores appreciated.      RADIOLOGY/NUCLEAR:  No results found.    LABORATORY/CULTURE RESULTS:      PATHOLOGY RESULTS:       ASSESSMENT/PLAN     Ryan was seen today for diabetes and numbness.    Diagnoses and all orders for this visit:    Controlled type 2 diabetes mellitus without complication, without long-term current use of insulin      Comprehensive lower extremity examination and evaluation was performed.  Discussed findings and treatment plan including risks, benefits, and treatment options with patient in detail.  Patient agreed with treatment plan.  Patient may maintain nails and calluses at home utilizing emery board or pumice stone between visits as needed  Reviewed at home diabetic foot care including daily foot checks   At this time patient is mostly NV intact and at low risk for DM foot complications  An After Visit Summary was printed and given to the patient at discharge, including (if requested) any available informative/educational handouts regarding diagnosis, treatment, or medications. All questions were answered to patient/family satisfaction. Should symptoms fail to improve or worsen they agree to call or return to clinic or to go to the Emergency Department. Discussed the importance of following up with any needed screening tests/labs/specialist appointments and any requested follow-up recommended by me today. Importance of maintaining follow-up discussed and patient accepts that missed appointments can delay diagnosis and potentially lead to worsening of conditions.  Return in about 6 months (around 5/15/2018)., or sooner if acute issues arise.        This document has been electronically signed by Mayo Marrero DPM on November 15, 2017 10:29 AM

## 2017-11-15 NOTE — PATIENT INSTRUCTIONS
Diabetes and Foot Care  Diabetes may cause you to have problems because of poor blood supply (circulation) to your feet and legs. This may cause the skin on your feet to become thinner, break easier, and heal more slowly. Your skin may become dry, and the skin may peel and crack. You may also have nerve damage in your legs and feet causing decreased feeling in them. You may not notice minor injuries to your feet that could lead to infections or more serious problems. Taking care of your feet is one of the most important things you can do for yourself.  HOME CARE INSTRUCTIONS  · Wear shoes at all times, even in the house. Do not go barefoot. Bare feet are easily injured.  · Check your feet daily for blisters, cuts, and redness. If you cannot see the bottom of your feet, use a mirror or ask someone for help.  · Wash your feet with warm water (do not use hot water) and mild soap. Then pat your feet and the areas between your toes until they are completely dry. Do not soak your feet as this can dry your skin.  · Apply a moisturizing lotion or petroleum jelly (that does not contain alcohol and is unscented) to the skin on your feet and to dry, brittle toenails. Do not apply lotion between your toes.  · Trim your toenails straight across. Do not dig under them or around the cuticle. File the edges of your nails with an emery board or nail file.  · Do not cut corns or calluses or try to remove them with medicine.  · Wear clean socks or stockings every day. Make sure they are not too tight. Do not wear knee-high stockings since they may decrease blood flow to your legs.  · Wear shoes that fit properly and have enough cushioning. To break in new shoes, wear them for just a few hours a day. This prevents you from injuring your feet. Always look in your shoes before you put them on to be sure there are no objects inside.  · Do not cross your legs. This may decrease the blood flow to your feet.  · If you find a minor scrape,  cut, or break in the skin on your feet, keep it and the skin around it clean and dry. These areas may be cleansed with mild soap and water. Do not cleanse the area with peroxide, alcohol, or iodine.  · When you remove an adhesive bandage, be sure not to damage the skin around it.  · If you have a wound, look at it several times a day to make sure it is healing.  · Do not use heating pads or hot water bottles. They may burn your skin. If you have lost feeling in your feet or legs, you may not know it is happening until it is too late.  · Make sure your health care provider performs a complete foot exam at least annually or more often if you have foot problems. Report any cuts, sores, or bruises to your health care provider immediately.  SEEK MEDICAL CARE IF:   · You have an injury that is not healing.  · You have cuts or breaks in the skin.  · You have an ingrown nail.  · You notice redness on your legs or feet.  · You feel burning or tingling in your legs or feet.  · You have pain or cramps in your legs and feet.  · Your legs or feet are numb.  · Your feet always feel cold.  SEEK IMMEDIATE MEDICAL CARE IF:   · There is increasing redness, swelling, or pain in or around a wound.  · There is a red line that goes up your leg.  · Pus is coming from a wound.  · You develop a fever or as directed by your health care provider.  · You notice a bad smell coming from an ulcer or wound.     This information is not intended to replace advice given to you by your health care provider. Make sure you discuss any questions you have with your health care provider.     Document Released: 12/15/2001 Document Revised: 04/10/2017 Document Reviewed: 05/27/2014  [employer] Patient Education ©2017 [employer]

## 2018-01-02 ENCOUNTER — HOSPITAL ENCOUNTER (OUTPATIENT)
Facility: HOSPITAL | Age: 63
Setting detail: HOSPITAL OUTPATIENT SURGERY
Discharge: HOME | End: 2018-01-02
Attending: INTERNAL MEDICINE | Admitting: INTERNAL MEDICINE

## 2018-01-02 ENCOUNTER — ANESTHESIA EVENT (OUTPATIENT)
Dept: GASTROENTEROLOGY | Facility: HOSPITAL | Age: 63
End: 2018-01-02

## 2018-01-02 ENCOUNTER — ANESTHESIA (OUTPATIENT)
Dept: GASTROENTEROLOGY | Facility: HOSPITAL | Age: 63
End: 2018-01-02

## 2018-01-02 VITALS
OXYGEN SATURATION: 96 % | BODY MASS INDEX: 26.84 KG/M2 | DIASTOLIC BLOOD PRESSURE: 82 MMHG | TEMPERATURE: 97.2 F | RESPIRATION RATE: 18 BRPM | HEART RATE: 68 BPM | SYSTOLIC BLOOD PRESSURE: 131 MMHG | HEIGHT: 67 IN | WEIGHT: 171 LBS

## 2018-01-02 DIAGNOSIS — K20.90 ESOPHAGITIS: ICD-10-CM

## 2018-01-02 LAB — GLUCOSE BLDC GLUCOMTR-MCNC: 145 MG/DL (ref 70–130)

## 2018-01-02 PROCEDURE — 25010000002 PROPOFOL 10 MG/ML EMULSION: Performed by: NURSE ANESTHETIST, CERTIFIED REGISTERED

## 2018-01-02 PROCEDURE — 43239 EGD BIOPSY SINGLE/MULTIPLE: CPT | Performed by: INTERNAL MEDICINE

## 2018-01-02 PROCEDURE — 82962 GLUCOSE BLOOD TEST: CPT

## 2018-01-02 PROCEDURE — 88305 TISSUE EXAM BY PATHOLOGIST: CPT | Performed by: INTERNAL MEDICINE

## 2018-01-02 RX ORDER — SODIUM CHLORIDE 9 MG/ML
100 INJECTION, SOLUTION INTRAVENOUS CONTINUOUS
Status: CANCELLED | OUTPATIENT
Start: 2018-01-02

## 2018-01-02 RX ORDER — SODIUM CHLORIDE 0.9 % (FLUSH) 0.9 %
1-10 SYRINGE (ML) INJECTION AS NEEDED
Status: CANCELLED | OUTPATIENT
Start: 2018-01-02

## 2018-01-02 RX ORDER — SODIUM CHLORIDE 0.9 % (FLUSH) 0.9 %
3 SYRINGE (ML) INJECTION AS NEEDED
Status: DISCONTINUED | OUTPATIENT
Start: 2018-01-02 | End: 2018-01-02 | Stop reason: HOSPADM

## 2018-01-02 RX ORDER — PROPOFOL 10 MG/ML
VIAL (ML) INTRAVENOUS AS NEEDED
Status: DISCONTINUED | OUTPATIENT
Start: 2018-01-02 | End: 2018-01-02 | Stop reason: SURG

## 2018-01-02 RX ORDER — LIDOCAINE HYDROCHLORIDE 20 MG/ML
INJECTION, SOLUTION INFILTRATION; PERINEURAL AS NEEDED
Status: DISCONTINUED | OUTPATIENT
Start: 2018-01-02 | End: 2018-01-02 | Stop reason: SURG

## 2018-01-02 RX ORDER — SODIUM CHLORIDE 9 MG/ML
500 INJECTION, SOLUTION INTRAVENOUS CONTINUOUS PRN
Status: DISCONTINUED | OUTPATIENT
Start: 2018-01-02 | End: 2018-01-02 | Stop reason: HOSPADM

## 2018-01-02 RX ADMIN — SODIUM CHLORIDE 500 ML: 9 INJECTION, SOLUTION INTRAVENOUS at 07:43

## 2018-01-02 RX ADMIN — LIDOCAINE HYDROCHLORIDE 0.5 ML: 10 INJECTION, SOLUTION EPIDURAL; INFILTRATION; INTRACAUDAL; PERINEURAL at 07:43

## 2018-01-02 RX ADMIN — PROPOFOL 100 MG: 10 INJECTION, EMULSION INTRAVENOUS at 08:58

## 2018-01-02 RX ADMIN — LIDOCAINE HYDROCHLORIDE 50 MG: 20 INJECTION, SOLUTION INFILTRATION; PERINEURAL at 08:58

## 2018-01-02 NOTE — PLAN OF CARE
Problem: Patient Care Overview (Adult)  Goal: Adult Individualization and Mutuality  Outcome: Outcome(s) achieved Date Met: 01/02/18 01/02/18 0727   Individualization   Patient Specific Preferences NONE

## 2018-01-02 NOTE — PLAN OF CARE
Problem: GI Endoscopy (Adult)  Goal: Signs and Symptoms of Listed Potential Problems Will be Absent or Manageable (GI Endoscopy)  Outcome: Outcome(s) achieved Date Met: 01/02/18 01/02/18 0907   GI Endoscopy   Problems Assessed (GI Endoscopy) all   Problems Present (GI Endoscopy) none

## 2018-01-02 NOTE — PLAN OF CARE
Problem: Patient Care Overview (Adult)  Goal: Plan of Care Review  Outcome: Outcome(s) achieved Date Met: 01/02/18 01/02/18 0907   Coping/Psychosocial Response Interventions   Plan Of Care Reviewed With patient;spouse   Patient Care Overview   Progress improving   Outcome Evaluation   Outcome Summary/Follow up Plan discharge criteria met

## 2018-01-02 NOTE — PLAN OF CARE
Problem: Patient Care Overview (Adult)  Goal: Plan of Care Review  Outcome: Ongoing (interventions implemented as appropriate)   01/02/18 0859   Coping/Psychosocial Response Interventions   Plan Of Care Reviewed With patient   Patient Care Overview   Progress no change   Outcome Evaluation   Outcome Summary/Follow up Plan tolerating well       Problem: GI Endoscopy (Adult)  Goal: Signs and Symptoms of Listed Potential Problems Will be Absent or Manageable (GI Endoscopy)  Outcome: Ongoing (interventions implemented as appropriate)

## 2018-01-02 NOTE — ANESTHESIA PREPROCEDURE EVALUATION
Anesthesia Evaluation     history of anesthetic complications: PONV  NPO Solid Status: > 8 hours  NPO Liquid Status: > 8 hours     Airway   Mallampati: III  TM distance: >3 FB  Neck ROM: full  no difficulty expected  Dental - normal exam     Pulmonary - normal exam    breath sounds clear to auscultation  (-) asthma, recent URI, sleep apnea, not a smoker  Cardiovascular - normal exam  Exercise tolerance: excellent (>7 METS)    Rhythm: regular  Rate: normal    (+) hypertension well controlled, valvular problems/murmurs (diagnosed 20 years ago, no issues) MVP,   (-) pacemaker, past MI, angina, cardiac stents, CABG      Neuro/Psych  (-) seizures, TIA, CVA  GI/Hepatic/Renal/Endo    (+)  GERD well controlled, diabetes mellitus,   (-) liver disease, no renal disease, hypothyroidism, hyperthyroidism    Musculoskeletal     Abdominal    Substance History      OB/GYN          Other                                                Anesthesia Plan    ASA 2     general   total IV anesthesia  intravenous induction   Anesthetic plan and risks discussed with patient.

## 2018-01-02 NOTE — ANESTHESIA POSTPROCEDURE EVALUATION
Patient: Ryan Sanchez    Procedure Summary     Date Anesthesia Start Anesthesia Stop Room / Location    01/02/18 0856 0904 [facility] ENDOSCOPY 2 / [facility]OS[facility]       Procedure Diagnosis Surgeon Provider    ESOPHAGOGASTRODUODENOSCOPY WITH ANESTHESIA (N/A Esophagus) Esophagitis  (Esophagitis [K20.9]) MD Kayode Hall CRNA          Anesthesia Type: general  Last vitals  BP   127/98 (01/02/18 0920)   Temp   97.2 °F (36.2 °C) (01/02/18 0728)   Pulse   72 (01/02/18 0920)   Resp   15 (01/02/18 0920)     SpO2   96 % (01/02/18 0920)     Post Anesthesia Care and Evaluation    Patient location during evaluation: PHASE II  Level of consciousness: awake and alert  Pain management: adequate  Airway patency: patent  Anesthetic complications: No anesthetic complications    Cardiovascular status: acceptable  Respiratory status: acceptable  Hydration status: acceptable

## 2018-01-02 NOTE — H&P
[facility] Gastroenterology  Pre Procedure History & Physical    Chief Complaint:   Esophagitis    Subjective     HPI:   Ear for follow-up endoscopy.  History of severe esophagitis.  Patient states that his symptoms are improved.  No dysphagia.    Past Medical History:   Past Medical History:   Diagnosis Date   • Arthritis    • Huggins esophagus    • Bladder stone    • Diabetes mellitus    • Hx of colonic polyps    • Hypertension    • Kidney stones    • Melanoma    • Mitral valve prolapse    • PONV (postoperative nausea and vomiting)        Past Surgical History:  [unfilled]    Family History:  Family History   Problem Relation Age of Onset   • No Known Problems Father    • No Known Problems Mother    • Colon cancer Maternal Grandfather        Social History:   reports that he has never smoked. He has never used smokeless tobacco. He reports that he drinks alcohol. He reports that he does not use illicit drugs.    Medications:   Prior to Admission medications    Medication Sig Start Date End Date Taking? Authorizing Provider   acetaminophen (TYLENOL) 500 MG tablet Take 500 mg by mouth As Needed for mild pain (1-3).   Yes Historical Provider, MD   amLODIPine (NORVASC) 5 MG tablet Take 5 mg by mouth Daily.   Yes Historical Provider, MD   lisinopril (PRINIVIL,ZESTRIL) 2.5 MG tablet Take 2.5 mg by mouth Daily.   Yes Historical Provider, MD   loratadine-pseudoephedrine (CLARITIN-D 24 HOUR)  MG per 24 hr tablet Take 1 tablet by mouth Daily.   Yes Historical Provider, MD   metFORMIN (GLUCOPHAGE) 500 MG tablet Take 500 mg by mouth 2 (Two) Times a Day With Meals.   Yes Historical Provider, MD   pantoprazole (PROTONIX) 40 MG EC tablet Take 1 tablet by mouth 2 (Two) Times a Day. 10/5/17  Yes Joshua Zuleta MD       Allergies:  Review of patient's allergies indicates no known allergies.    Objective     Blood pressure 148/91, pulse 68, temperature 97.2 °F (36.2 °C), temperature source Temporal Artery , resp. rate 18,  "height 170.2 cm (67\"), weight 77.6 kg (171 lb), SpO2 99 %.    Physical Exam   Constitutional: Pt is oriented to person, place, and in no distress.   HENT: Mouth/Throat: Oropharynx is clear.   Cardiovascular: Normal rate, regular rhythm.    Pulmonary/Chest: Effort normal. No respiratory distress. No  wheezes.   Abdominal: Soft. Non-distended.  Skin: Skin is warm and dry.   Psychiatric: Mood, memory, affect and judgment appear normal.     Assessment/Plan     Diagnosis:  Esophagitis    Anticipated Surgical Procedure:    Proceed with surveillance evaluation to document healing    The following major R/B/A were discussed with the patient, however the list is not all inclusive . Risk:  Bleeding (immediate and delayed), perforation (rupture or tear), reaction to medication, missed lesion/cancer, pain during the procedure, infection, need for surgery, need for ostomy, need for mechanical ventilation (breathing machine), death.  Benefits: removal of polyp/tissue, burn/clip/or inject to stop bleeding, removal of foreign body, dilate any stricture.  Alternatives: Xray or CT, surgery, do nothing with associated risk   The patient was given time to ask question and received explanation, and agrees to proceed as per History and Physical.   No guarantee given or expressed.    EMR Dragon/transcription disclaimer: Much of this encounter note is an electronic transcription/translation of spoken language to printed text.  The electronic translation of spoken language may permit erroneous, or at times, nonsensical words or phrases to be inadvertently transcribed.  Although I have reviewed the note for such errors, some may still exist.    Joshua Zuleta MD  8:55 AM  1/2/2018    "

## 2018-01-03 LAB
CYTO UR: NORMAL
LAB AP CASE REPORT: NORMAL
LAB AP CLINICAL INFORMATION: NORMAL
Lab: NORMAL
PATH REPORT.FINAL DX SPEC: NORMAL
PATH REPORT.GROSS SPEC: NORMAL

## 2018-04-16 ENCOUNTER — TRANSCRIBE ORDERS (OUTPATIENT)
Dept: GENERAL RADIOLOGY | Facility: HOSPITAL | Age: 63
End: 2018-04-16

## 2018-04-16 ENCOUNTER — HOSPITAL ENCOUNTER (OUTPATIENT)
Dept: GENERAL RADIOLOGY | Facility: HOSPITAL | Age: 63
Discharge: HOME | End: 2018-04-16
Attending: EMERGENCY MEDICINE | Admitting: EMERGENCY MEDICINE

## 2018-04-16 DIAGNOSIS — R05.9 COUGH: ICD-10-CM

## 2018-04-16 DIAGNOSIS — R52 ACHES: Primary | ICD-10-CM

## 2018-04-16 PROCEDURE — 71046 X-RAY EXAM CHEST 2 VIEWS: CPT

## 2018-04-18 ENCOUNTER — HOSPITAL ENCOUNTER (OUTPATIENT)
Dept: CT IMAGING | Facility: HOSPITAL | Age: 63
Discharge: HOME | End: 2018-04-18
Attending: EMERGENCY MEDICINE | Admitting: EMERGENCY MEDICINE

## 2018-04-18 ENCOUNTER — TRANSCRIBE ORDERS (OUTPATIENT)
Dept: ADMINISTRATIVE | Facility: HOSPITAL | Age: 63
End: 2018-04-18

## 2018-04-18 DIAGNOSIS — R51.9 NONINTRACTABLE HEADACHE, UNSPECIFIED CHRONICITY PATTERN, UNSPECIFIED HEADACHE TYPE: ICD-10-CM

## 2018-04-18 DIAGNOSIS — R52 BODY ACHES: Primary | ICD-10-CM

## 2018-04-18 DIAGNOSIS — R52 BODY ACHES: ICD-10-CM

## 2018-04-18 PROCEDURE — 70450 CT HEAD/BRAIN W/O DYE: CPT

## 2018-05-23 ENCOUNTER — OFFICE VISIT (OUTPATIENT)
Dept: PODIATRY | Facility: CLINIC | Age: 63
End: 2018-05-23

## 2018-05-23 VITALS
BODY MASS INDEX: 26.37 KG/M2 | OXYGEN SATURATION: 96 % | SYSTOLIC BLOOD PRESSURE: 130 MMHG | WEIGHT: 168 LBS | HEART RATE: 61 BPM | HEIGHT: 67 IN | DIASTOLIC BLOOD PRESSURE: 78 MMHG

## 2018-05-23 DIAGNOSIS — E11.9 CONTROLLED TYPE 2 DIABETES MELLITUS WITHOUT COMPLICATION, WITHOUT LONG-TERM CURRENT USE OF INSULIN (HCC): ICD-10-CM

## 2018-05-23 DIAGNOSIS — M79.672 FOOT PAIN, LEFT: ICD-10-CM

## 2018-05-23 DIAGNOSIS — M72.2 PLANTAR FASCIITIS, LEFT: Primary | ICD-10-CM

## 2018-05-23 PROCEDURE — 99213 OFFICE O/P EST LOW 20 MIN: CPT | Performed by: PODIATRIST

## 2018-05-23 NOTE — PATIENT INSTRUCTIONS
Diabetes Mellitus and Exercise  Exercising regularly is important for your overall health, especially when you have diabetes (diabetes mellitus). Exercising is not only about losing weight. It has many health benefits, such as increasing muscle strength and bone density and reducing body fat and stress. This leads to improved fitness, flexibility, and endurance, all of which result in better overall health.  Exercise has additional benefits for people with diabetes, including:  · Reducing appetite.  · Helping to lower and control blood glucose.  · Lowering blood pressure.  · Helping to control amounts of fatty substances (lipids) in the blood, such as cholesterol and triglycerides.  · Helping the body to respond better to insulin (improving insulin sensitivity).  · Reducing how much insulin the body needs.  · Decreasing the risk for heart disease by:  ¨ Lowering cholesterol and triglyceride levels.  ¨ Increasing the levels of good cholesterol.  ¨ Lowering blood glucose levels.  What is my activity plan?  Your health care provider or certified diabetes educator can help you make a plan for the type and frequency of exercise (activity plan) that works for you. Make sure that you:  · Do at least 150 minutes of moderate-intensity or vigorous-intensity exercise each week. This could be brisk walking, biking, or water aerobics.  ¨ Do stretching and strength exercises, such as yoga or weightlifting, at least 2 times a week.  ¨ Spread out your activity over at least 3 days of the week.  · Get some form of physical activity every day.  ¨ Do not go more than 2 days in a row without some kind of physical activity.  ¨ Avoid being inactive for more than 90 minutes at a time. Take frequent breaks to walk or stretch.  · Choose a type of exercise or activity that you enjoy, and set realistic goals.  · Start slowly, and gradually increase the intensity of your exercise over time.  What do I need to know about managing my  diabetes?  · Check your blood glucose before and after exercising.  ¨ If your blood glucose is higher than 240 mg/dL (13.3 mmol/L) before you exercise, check your urine for ketones. If you have ketones in your urine, do not exercise until your blood glucose returns to normal.  · Know the symptoms of low blood glucose (hypoglycemia) and how to treat it. Your risk for hypoglycemia increases during and after exercise. Common symptoms of hypoglycemia can include:  ¨ Hunger.  ¨ Anxiety.  ¨ Sweating and feeling clammy.  ¨ Confusion.  ¨ Dizziness or feeling light-headed.  ¨ Increased heart rate or palpitations.  ¨ Blurry vision.  ¨ Tingling or numbness around the mouth, lips, or tongue.  ¨ Tremors or shakes.  ¨ Irritability.  · Keep a rapid-acting carbohydrate snack available before, during, and after exercise to help prevent or treat hypoglycemia.  · Avoid injecting insulin into areas of the body that are going to be exercised. For example, avoid injecting insulin into:  ¨ The arms, when playing tennis.  ¨ The legs, when jogging.  · Keep records of your exercise habits. Doing this can help you and your health care provider adjust your diabetes management plan as needed. Write down:  ¨ Food that you eat before and after you exercise.  ¨ Blood glucose levels before and after you exercise.  ¨ The type and amount of exercise you have done.  ¨ When your insulin is expected to peak, if you use insulin. Avoid exercising at times when your insulin is peaking.  · When you start a new exercise or activity, work with your health care provider to make sure the activity is safe for you, and to adjust your insulin, medicines, or food intake as needed.  · Drink plenty of water while you exercise to prevent dehydration or heat stroke. Drink enough fluid to keep your urine clear or pale yellow.  This information is not intended to replace advice given to you by your health care provider. Make sure you discuss any questions you have with  your health care provider.  Document Released: 03/09/2005 Document Revised: 07/07/2017 Document Reviewed: 05/29/2017  [employer] Interactive Patient Education © 2017 [employer]    Plantar Fasciitis With Rehab  The plantar fascia is a fibrous, ligament-like, soft-tissue structure that spans the bottom of the foot. Plantar fasciitis, also called heel spur syndrome, is a condition that causes pain in the foot due to inflammation of the tissue.  SYMPTOMS   · Pain and tenderness on the underneath side of the foot.  · Pain that worsens with standing or walking.  CAUSES   Plantar fasciitis is caused by irritation and injury to the plantar fascia on the underneath side of the foot. Common mechanisms of injury include:  · Direct trauma to bottom of the foot.  · Damage to a small nerve that runs under the foot where the main fascia attaches to the heel bone.  · Stress placed on the plantar fascia due to bone spurs.  RISK INCREASES WITH:   · Activities that place stress on the plantar fascia (running, jumping, pivoting, or cutting).  · Poor strength and flexibility.  · Improperly fitted shoes.  · Tight calf muscles.  · Flat feet.  · Failure to warm-up properly before activity.  · Obesity.  PREVENTION  · Warm up and stretch properly before activity.  · Allow for adequate recovery between workouts.  · Maintain physical fitness:    Strength, flexibility, and endurance.    Cardiovascular fitness.  · Maintain a health body weight.  · Avoid stress on the plantar fascia.  · Wear properly fitted shoes, including arch supports for individuals who have flat feet.  PROGNOSIS   If treated properly, then the symptoms of plantar fasciitis usually resolve without surgery. However, occasionally surgery is necessary.  RELATED COMPLICATIONS   · Recurrent symptoms that may result in a chronic condition.  · Problems of the lower back that are caused by compensating for the injury, such as limping.  · Pain or weakness of the foot during push-off  following surgery.  · Chronic inflammation, scarring, and partial or complete fascia tear, occurring more often from repeated injections.  TREATMENT   Treatment initially involves the use of ice and medication to help reduce pain and inflammation. The use of strengthening and stretching exercises may help reduce pain with activity, especially stretches of the Achilles tendon. These exercises may be performed at home or with a therapist. Your caregiver may recommend that you use heel cups of arch supports to help reduce stress on the plantar fascia. Occasionally, corticosteroid injections are given to reduce inflammation. If symptoms persist for greater than 6 months despite non-surgical (conservative), then surgery may be recommended.   MEDICATION   · If pain medication is necessary, then nonsteroidal anti-inflammatory medications, such as aspirin and ibuprofen, or other minor pain relievers, such as acetaminophen, are often recommended.  · Do not take pain medication within 7 days before surgery.  · Prescription pain relievers may be given if deemed necessary by your caregiver. Use only as directed and only as much as you need.  · Corticosteroid injections may be given by your caregiver. These injections should be reserved for the most serious cases, because they may only be given a certain number of times.  HEAT AND COLD  · Cold treatment (icing) relieves pain and reduces inflammation. Cold treatment should be applied for 10 to 15 minutes every 2 to 3 hours for inflammation and pain and immediately after any activity that aggravates your symptoms. Use ice packs or massage the area with a piece of ice (ice massage).  · Heat treatment may be used prior to performing the stretching and strengthening activities prescribed by your caregiver, physical therapist, or . Use a heat pack or soak the injury in warm water.  SEEK IMMEDIATE MEDICAL CARE IF:  · Treatment seems to offer no benefit, or the condition  worsens.  · Any medications produce adverse side effects.  EXERCISES  RANGE OF MOTION (ROM) AND STRETCHING EXERCISES - Plantar Fasciitis (Heel Spur Syndrome)  These exercises may help you when beginning to rehabilitate your injury. Your symptoms may resolve with or without further involvement from your physician, physical therapist or . While completing these exercises, remember:   · Restoring tissue flexibility helps normal motion to return to the joints. This allows healthier, less painful movement and activity.  · An effective stretch should be held for at least 30 seconds.  · A stretch should never be painful. You should only feel a gentle lengthening or release in the stretched tissue.  RANGE OF MOTION - Toe Extension, Flexion  · Sit with your right / left leg crossed over your opposite knee.  · Grasp your toes and gently pull them back toward the top of your foot. You should feel a stretch on the bottom of your toes and/or foot.  · Hold this stretch for __________ seconds.  · Now, gently pull your toes toward the bottom of your foot. You should feel a stretch on the top of your toes and or foot.  · Hold this stretch for __________ seconds.  Repeat __________ times. Complete this stretch __________ times per day.   RANGE OF MOTION - Ankle Dorsiflexion, Active Assisted  · Remove shoes and sit on a chair that is preferably not on a carpeted surface.  · Place right / left foot under knee. Extend your opposite leg for support.  · Keeping your heel down, slide your right / left foot back toward the chair until you feel a stretch at your ankle or calf. If you do not feel a stretch, slide your bottom forward to the edge of the chair, while still keeping your heel down.  · Hold this stretch for __________ seconds.  Repeat __________ times. Complete this stretch __________ times per day.   STRETCH - Gastroc, Standing  · Place hands on wall.  · Extend right / left leg, keeping the front knee somewhat  bent.  · Slightly point your toes inward on your back foot.  · Keeping your right / left heel on the floor and your knee straight, shift your weight toward the wall, not allowing your back to arch.  · You should feel a gentle stretch in the right / left calf. Hold this position for __________ seconds.  Repeat __________ times. Complete this stretch __________ times per day.  STRETCH - Soleus, Standing  · Place hands on wall.  · Extend right / left leg, keeping the other knee somewhat bent.  · Slightly point your toes inward on your back foot.  · Keep your right / left heel on the floor, bend your back knee, and slightly shift your weight over the back leg so that you feel a gentle stretch deep in your back calf.  · Hold this position for __________ seconds.  Repeat __________ times. Complete this stretch __________ times per day.  STRETCH - Gastrocsoleus, Standing   Note: This exercise can place a lot of stress on your foot and ankle. Please complete this exercise only if specifically instructed by your caregiver.   · Place the ball of your right / left foot on a step, keeping your other foot firmly on the same step.  · Hold on to the wall or a rail for balance.  · Slowly lift your other foot, allowing your body weight to press your heel down over the edge of the step.  · You should feel a stretch in your right / left calf.  · Hold this position for __________ seconds.  · Repeat this exercise with a slight bend in your right / left knee.  Repeat __________ times. Complete this stretch __________ times per day.   STRENGTHENING EXERCISES - Plantar Fasciitis (Heel Spur Syndrome)   These exercises may help you when beginning to rehabilitate your injury. They may resolve your symptoms with or without further involvement from your physician, physical therapist or . While completing these exercises, remember:   · Muscles can gain both the endurance and the strength needed for everyday activities through  controlled exercises.  · Complete these exercises as instructed by your physician, physical therapist or . Progress the resistance and repetitions only as guided.  STRENGTH - Towel Curls  · Sit in a chair positioned on a non-carpeted surface.  · Place your foot on a towel, keeping your heel on the floor.  · Pull the towel toward your heel by only curling your toes. Keep your heel on the floor.  · If instructed by your physician, physical therapist or , add ____________________ at the end of the towel.  Repeat __________ times. Complete this exercise __________ times per day.  STRENGTH - Ankle Inversion  · Secure one end of a rubber exercise band/tubing to a fixed object (table, pole). Loop the other end around your foot just before your toes.  · Place your fists between your knees. This will focus your strengthening at your ankle.  · Slowly, pull your big toe up and in, making sure the band/tubing is positioned to resist the entire motion.  · Hold this position for __________ seconds.  · Have your muscles resist the band/tubing as it slowly pulls your foot back to the starting position.  Repeat __________ times. Complete this exercises __________ times per day.      This information is not intended to replace advice given to you by your health care provider. Make sure you discuss any questions you have with your health care provider.     Document Released: 12/18/2006 Document Revised: 05/03/2016 Document Reviewed: 10/31/2016  [employer] Interactive Patient Education ©2017 [employer]

## 2018-05-23 NOTE — PROGRESS NOTES
[facility] - PODIATRY    Today's Date: 05/23/18    Patient Name: Ryan Sanchez  MRN: [id number]  CSN: [id number]  PCP: Davidson Guzman MD  Referring Provider: No ref. provider found    SUBJECTIVE     Chief Complaint   Patient presents with   • Right Foot - Follow-up     PT c/o pain in left foot. H/o fracture in left foot. PT denies numbness and pain in right foot. Pain scale: 4/10 in left foot.    • Left Foot - Pain   • Diabetes     Blood sugar checked last week - 205. PCP: Dr. Davidson Guzman      HPI: Ryan Sanchez, a 62 y.o.male, comes to clinic as a(n) established patient presenting for diabetic foot exam and complaining of foot pain. Patient has h/o arthritis, akers's esophagus, DM, HTN, kidney stones, melanoma, mitral valve prolapse. States that he was diagnosed with DM last year. He feels numbness in his right foot but also relates sciatic pain which is treated by chiropractor. Denies wounds or sores. Relates that 30 years ago he fell from a roof 30 feet and crushed his right foot. States that he is starting to have pain in his left foot, especially in the morning and after rest. Denies injury or trauma. Patient is NIDDM with last stated BG level of 205mg/dl. Admits pain at 4/10 level and described as aching and nagging. Denies previous treatment. Denies any constitutional symptoms. No other pedal complaints at this time.    Past Medical History:   Diagnosis Date   • Arthritis    • Akers esophagus    • Bladder stone    • Diabetes mellitus    • Hx of colonic polyps    • Hypertension    • Kidney stones    • Melanoma    • Mitral valve prolapse    • PONV (postoperative nausea and vomiting)      Past Surgical History:   Procedure Laterality Date   • BLADDER NECK CONTRACTURE REPAIR N/A 12/2/2016    Procedure: BLADDER NECK CONTRACTURE REPAIR;  Surgeon: Ryan Lewis MD;  Location: [facility] OR;  Service:    • COLONOSCOPY W/ POLYPECTOMY  09/10/2015    Tubular adenoma at 30 cm repeat exam in  5 years   • CYSTOSCOPY BLADDER STONE LITHOTRIPSY  2011   • CYSTOSCOPY W/ URETERAL STENT REMOVAL N/A 12/2/2016    Procedure: CYSTOLITHOLAPAXY WITH LASER and TRANURETHRAL INCISION OF BLADDER NECK CONTRACTURE;  Surgeon: Ryan Lewis MD;  Location: [facility] OR;  Service:    • ENDOSCOPY  07/14/2014    HH, Reflux esophagitis   • ENDOSCOPY N/A 10/5/2017    Procedure: ESOPHAGOGASTRODUODENOSCOPY WITH ANESTHESIA;  Surgeon: Joshua Zuleta MD;  Location: [facility] ENDOSCOPY;  Service:    • ENDOSCOPY N/A 1/2/2018    Procedure: ESOPHAGOGASTRODUODENOSCOPY WITH ANESTHESIA;  Surgeon: Joshua Zuleta MD;  Location: [facility] ENDOSCOPY;  Service:    • VASECTOMY       Family History   Problem Relation Age of Onset   • No Known Problems Father    • No Known Problems Mother    • Colon cancer Maternal Grandfather      Social History     Social History   • Marital status: Single     Spouse name: N/A   • Number of children: N/A   • Years of education: N/A     Occupational History   • Not on file.     Social History Main Topics   • Smoking status: Never Smoker   • Smokeless tobacco: Never Used   • Alcohol use Yes      Comment: daily   • Drug use: No   • Sexual activity: Defer     Other Topics Concern   • Not on file     Social History Narrative   • No narrative on file     No Known Allergies  Current Outpatient Prescriptions   Medication Sig Dispense Refill   • acetaminophen (TYLENOL) 500 MG tablet Take 500 mg by mouth As Needed for mild pain (1-3).     • amLODIPine (NORVASC) 5 MG tablet Take 5 mg by mouth Daily.     • lisinopril (PRINIVIL,ZESTRIL) 2.5 MG tablet Take 2.5 mg by mouth Daily.     • loratadine-pseudoephedrine (CLARITIN-D 24 HOUR)  MG per 24 hr tablet Take 1 tablet by mouth Daily.     • metFORMIN (GLUCOPHAGE) 500 MG tablet Take 500 mg by mouth 2 (Two) Times a Day With Meals.     • pantoprazole (PROTONIX) 40 MG EC tablet Take 1 tablet by mouth 2 (Two) Times a Day. 180 tablet 3     Current Facility-Administered Medications    Medication Dose Route Frequency Provider Last Rate Last Dose   • lidocaine (XYLOCAINE) 2 % jelly   Topical PRN Ryan Lewis MD         Review of Systems   Constitutional: Negative for chills and fever.   HENT: Negative for congestion.    Respiratory: Negative for shortness of breath.    Cardiovascular: Negative for chest pain and leg swelling.   Gastrointestinal: Negative for constipation, diarrhea, nausea and vomiting.   Musculoskeletal:        Foot pain   Skin: Negative for wound.   Neurological: Positive for numbness.       OBJECTIVE     Vitals:    05/23/18 0854   BP: 130/78   Pulse: 61   SpO2: 96%       PHYSICAL EXAM  GEN:   A&Ox3, NAD. Pt presents to clinic ambulating without assistance and wearing Casual Shoes without inserts.      NEURO:   Protective sensation intact to 10/10 sites Right foot, 10/10 site Left foot using [place] monofilament  Light touch sensation diminished  No Tinel's or Villeux sign.    VASC:  Skin temperature Warm to Warm proximal to distal angie  DP pulses 2/4 Right, 2/4 Left  PT pulses 2/4 Right, 2/4 Left  CFT 3 sec angie  Pedal hair growth present  no edema noted angie  Varicosities absent angie    MUSC/SKEL:  Muscle Strength Right foot Dorsiflexors 5/5, Plantarflexors 5/5, Evertors 5/5, Invertors 5/5  Muscle Strength Left foot Dorsiflexors 5/5, Plantarflexors 5/5, Evertors 5/5, Invertors 5/5  ROM of the 1st MTP is full without pain or crepitus  ROM of the MTJ is full without pain or crepitus    ROM of the STJ is full without pain or crepitus    ROM of the ankle joint is full without pain or crepitus    Mild POP to left medial band of plantar fascia and medial calcaneal tubercle. Mildly bowstrung on WB  Rectus foot type   Gait pattern: Normal  No gross pedal musculoskeletal deformities noted.     DERM:  Pedal nails x10 are within normal limits of length and thickness  Webspaces are Clean, Dry, and Intact  Skin is normal in turgor and texture with no open wounds or sores  appreciated.      RADIOLOGY/NUCLEAR:  No results found.    LABORATORY/CULTURE RESULTS:      PATHOLOGY RESULTS:       ASSESSMENT/PLAN     Ryan was seen today for diabetes, follow-up and pain.    Diagnoses and all orders for this visit:    Plantar fasciitis, left    Controlled type 2 diabetes mellitus without complication, without long-term current use of insulin    Foot pain, left      Comprehensive lower extremity examination and evaluation was performed.  Discussed findings and treatment plan including risks, benefits, and treatment options with patient in detail. Patient agreed with treatment plan.  Patient may maintain nails and calluses at home utilizing emery board or pumice stone between visits as needed  Reviewed at home diabetic foot care including daily foot checks  Conservative therapy including daily stretching (demonstrated proper way of performing), icing 3x daily, decreased activity, and nsaids PRN.   Advised to purchase PowerStep Inserts   At this time patient is mostly NV intact and at low risk for DM foot complications  An After Visit Summary was printed and given to the patient at discharge, including (if requested) any available informative/educational handouts regarding diagnosis, treatment, or medications. All questions were answered to patient/family satisfaction. Should symptoms fail to improve or worsen they agree to call or return to clinic or to go to the Emergency Department. Discussed the importance of following up with any needed screening tests/labs/specialist appointments and any requested follow-up recommended by me today. Importance of maintaining follow-up discussed and patient accepts that missed appointments can delay diagnosis and potentially lead to worsening of conditions.  Return in about 6 months (around 11/23/2018)., or sooner if acute issues arise.        This document has been electronically signed by Mayo Marrero DPM on May 23, 2018 9:16 AM

## 2018-11-08 NOTE — PROGRESS NOTES
[facility] - PODIATRY    Today's Date: 11/16/18    Patient Name: Ryan Sanchez  MRN: [id number]  CSN: [id number]  PCP: Davidson Guzman MD  Referring Provider: No ref. provider found    SUBJECTIVE     Chief Complaint   Patient presents with   • Diabetes     Patient is here for diabetic foot and nail care. PT c/o occasional tingling in feet. Denies pain at present time. 200mg/dl. PCP: Dr. Davidson Guzman      HPI: Ryan Sanchez, a 63 y.o.male, comes to clinic as a(n) established patient presenting for diabetic foot exam and complaining of foot pain. Patient has h/o arthritis, akers's esophagus, DM, HTN, kidney stones, melanoma, mitral valve prolapse. States that he was diagnosed with DM last year. He feels numbness in his right foot but also relates sciatic pain which is treated by chiropractor. Currently undergoing rehab for bulging disc in neck. Denies wounds or sores. Relates continued off and on plantar fascial pain. He has not been doing as much stretching and icing as he should. He bought a pair of powerstep inserts and noted improvement, but has wore them out and yet to buy a new pair. Has most discomfort with first step of the morning.  Denies injury or trauma. Patient is NIDDM with last stated BG level of 200mg/dl. Denies pain today. Able to care of his own nails at home. Denies any constitutional symptoms. No other pedal complaints at this time.    Past Medical History:   Diagnosis Date   • Arthritis    • Akers esophagus    • Bladder stone    • Diabetes mellitus (CMS/HCC)    • Hx of colonic polyps    • Hypertension    • Kidney stones    • Melanoma (CMS/HCC)    • Mitral valve prolapse    • PONV (postoperative nausea and vomiting)      Past Surgical History:   Procedure Laterality Date   • COLONOSCOPY W/ POLYPECTOMY  09/10/2015    Tubular adenoma at 30 cm repeat exam in 5 years   • CYSTOSCOPY BLADDER STONE LITHOTRIPSY  2011   • ENDOSCOPY  07/14/2014    HH, Reflux esophagitis   •  VASECTOMY       Family History   Problem Relation Age of Onset   • No Known Problems Father    • No Known Problems Mother    • Colon cancer Maternal Grandfather      Social History     Socioeconomic History   • Marital status: Single     Spouse name: Not on file   • Number of children: Not on file   • Years of education: Not on file   • Highest education level: Not on file   Social Needs   • Financial resource strain: Not on file   • Food insecurity - worry: Not on file   • Food insecurity - inability: Not on file   • Transportation needs - medical: Not on file   • Transportation needs - non-medical: Not on file   Occupational History   • Not on file   Tobacco Use   • Smoking status: Never Smoker   • Smokeless tobacco: Never Used   Substance and Sexual Activity   • Alcohol use: Yes     Comment: daily   • Drug use: No   • Sexual activity: Defer   Other Topics Concern   • Not on file   Social History Narrative   • Not on file     No Known Allergies  Current Outpatient Medications   Medication Sig Dispense Refill   • acetaminophen (TYLENOL) 500 MG tablet Take 500 mg by mouth As Needed for mild pain (1-3).     • amLODIPine (NORVASC) 5 MG tablet Take 5 mg by mouth Daily.     • lisinopril (PRINIVIL,ZESTRIL) 2.5 MG tablet Take 2.5 mg by mouth Daily.     • loratadine-pseudoephedrine (CLARITIN-D 24 HOUR)  MG per 24 hr tablet Take 1 tablet by mouth Daily.     • metFORMIN (GLUCOPHAGE) 500 MG tablet Take 500 mg by mouth 2 (Two) Times a Day With Meals.     • pantoprazole (PROTONIX) 40 MG EC tablet Take 1 tablet by mouth 2 (Two) Times a Day. 180 tablet 3     Current Facility-Administered Medications   Medication Dose Route Frequency Provider Last Rate Last Dose   • lidocaine (XYLOCAINE) 2 % jelly   Topical PRN Ryan Lewis MD         Review of Systems   Constitutional: Negative for chills and fever.   HENT: Negative for congestion.    Respiratory: Negative for shortness of breath.    Cardiovascular: Negative for chest  pain and leg swelling.   Gastrointestinal: Negative for constipation, diarrhea, nausea and vomiting.   Musculoskeletal:        Foot pain   Skin: Negative for wound.   Neurological: Positive for numbness.       OBJECTIVE     Vitals:    11/16/18 0916   BP: 120/74   Pulse: 67   SpO2: 94%       PHYSICAL EXAM  GEN:   A&Ox3, NAD. Pt presents to clinic ambulating without assistance and wearing Casual Shoes without inserts.      NEURO:   Protective sensation intact to 10/10 sites Right foot, 10/10 site Left foot using [place] monofilament  Light touch sensation diminished  No Tinel's or Villeux sign.    VASC:  Skin temperature Warm to Warm proximal to distal angie  DP pulses 2/4 Right, 2/4 Left  PT pulses 2/4 Right, 2/4 Left  CFT 3 sec angie  Pedal hair growth present  no edema noted angie  Varicosities absent angie    MUSC/SKEL:  Muscle Strength Right foot Dorsiflexors 5/5, Plantarflexors 5/5, Evertors 5/5, Invertors 5/5  Muscle Strength Left foot Dorsiflexors 5/5, Plantarflexors 5/5, Evertors 5/5, Invertors 5/5  ROM of the 1st MTP is full without pain or crepitus  ROM of the MTJ is full without pain or crepitus    ROM of the STJ is full without pain or crepitus    ROM of the ankle joint is full without pain or crepitus    Mild POP to left medial band of plantar fascia and medial calcaneal tubercle - mildly improved. Mildly bowstrung on WB.  Rectus foot type   Gait pattern: Normal  No gross pedal musculoskeletal deformities noted.     DERM:  Pedal nails x10 are within normal limits of length and thickness  Webspaces are Clean, Dry, and Intact  Skin is normal in turgor and texture with no open wounds or sores appreciated.      RADIOLOGY/NUCLEAR:  No results found.    LABORATORY/CULTURE RESULTS:      PATHOLOGY RESULTS:       ASSESSMENT/PLAN     Ryan was seen today for diabetes.    Diagnoses and all orders for this visit:    Plantar fasciitis, left    Controlled type 2 diabetes mellitus without complication, without  long-term current use of insulin (CMS/[facility])    Foot pain, left      Comprehensive lower extremity examination and evaluation was performed.  Discussed findings and treatment plan including risks, benefits, and treatment options with patient in detail. Patient agreed with treatment plan.  Patient may maintain nails and calluses at home utilizing emery board or pumice stone between visits as needed  Reviewed at home diabetic foot care including daily foot checks  Conservative therapy including daily stretching (demonstrated proper way of performing), icing 3x daily, decreased activity, and nsaids PRN.   Advised to purchase new pair of PowerStep Inserts   At this time, patient is mostly NV intact and at low risk for DM foot complications  An After Visit Summary was printed and given to the patient at discharge, including (if requested) any available informative/educational handouts regarding diagnosis, treatment, or medications. All questions were answered to patient/family satisfaction. Should symptoms fail to improve or worsen they agree to call or return to clinic or to go to the Emergency Department. Discussed the importance of following up with any needed screening tests/labs/specialist appointments and any requested follow-up recommended by me today. Importance of maintaining follow-up discussed and patient accepts that missed appointments can delay diagnosis and potentially lead to worsening of conditions.  Return in about 6 months (around 5/16/2019)., or sooner if acute issues arise.        This document has been electronically signed by Mayo Marrero DPM on November 16, 2018 9:30 AM

## 2018-11-15 ENCOUNTER — TELEPHONE (OUTPATIENT)
Dept: PODIATRY | Facility: CLINIC | Age: 63
End: 2018-11-15

## 2018-11-15 NOTE — TELEPHONE ENCOUNTER
Called and reminded patient of appointment with Dr. Melo Marrero on November 16, 2018 at 9:15 am. Patient gave verbal confirmation of appointment at this time.

## 2018-11-16 ENCOUNTER — OFFICE VISIT (OUTPATIENT)
Dept: PODIATRY | Facility: CLINIC | Age: 63
End: 2018-11-16

## 2018-11-16 VITALS
BODY MASS INDEX: 26.37 KG/M2 | WEIGHT: 168 LBS | SYSTOLIC BLOOD PRESSURE: 120 MMHG | HEIGHT: 67 IN | OXYGEN SATURATION: 94 % | DIASTOLIC BLOOD PRESSURE: 74 MMHG | HEART RATE: 67 BPM

## 2018-11-16 DIAGNOSIS — E11.9 CONTROLLED TYPE 2 DIABETES MELLITUS WITHOUT COMPLICATION, WITHOUT LONG-TERM CURRENT USE OF INSULIN (HCC): ICD-10-CM

## 2018-11-16 DIAGNOSIS — M79.672 FOOT PAIN, LEFT: ICD-10-CM

## 2018-11-16 DIAGNOSIS — M72.2 PLANTAR FASCIITIS, LEFT: Primary | ICD-10-CM

## 2018-11-16 PROCEDURE — 99213 OFFICE O/P EST LOW 20 MIN: CPT | Performed by: PODIATRIST

## 2019-04-04 ENCOUNTER — TRANSCRIBE ORDERS (OUTPATIENT)
Dept: ADMINISTRATIVE | Facility: HOSPITAL | Age: 64
End: 2019-04-04

## 2019-04-04 DIAGNOSIS — R06.02 SHORTNESS OF BREATH: Primary | ICD-10-CM

## 2019-04-11 ENCOUNTER — HOSPITAL ENCOUNTER (OUTPATIENT)
Dept: CARDIOLOGY | Facility: HOSPITAL | Age: 64
Discharge: HOME | End: 2019-04-11
Admitting: NURSE PRACTITIONER

## 2019-04-11 VITALS
HEIGHT: 68 IN | HEART RATE: 70 BPM | DIASTOLIC BLOOD PRESSURE: 85 MMHG | WEIGHT: 165 LBS | BODY MASS INDEX: 25.01 KG/M2 | SYSTOLIC BLOOD PRESSURE: 128 MMHG

## 2019-04-11 DIAGNOSIS — R06.02 SHORTNESS OF BREATH: ICD-10-CM

## 2019-04-11 PROCEDURE — 93350 STRESS TTE ONLY: CPT

## 2019-04-11 PROCEDURE — 93017 CV STRESS TEST TRACING ONLY: CPT

## 2019-04-11 PROCEDURE — 93018 CV STRESS TEST I&R ONLY: CPT | Performed by: INTERNAL MEDICINE

## 2019-04-11 PROCEDURE — 25010000003 DOBUTAMINE PER 250 MG: Performed by: INTERNAL MEDICINE

## 2019-04-11 PROCEDURE — 25010000002 PERFLUTREN 6.52 MG/ML SUSPENSION: Performed by: INTERNAL MEDICINE

## 2019-04-11 PROCEDURE — 93352 ADMIN ECG CONTRAST AGENT: CPT | Performed by: INTERNAL MEDICINE

## 2019-04-11 PROCEDURE — 93350 STRESS TTE ONLY: CPT | Performed by: INTERNAL MEDICINE

## 2019-04-11 RX ORDER — DOBUTAMINE HYDROCHLORIDE 100 MG/100ML
10-50 INJECTION INTRAVENOUS CONTINUOUS
Status: DISCONTINUED | OUTPATIENT
Start: 2019-04-11 | End: 2019-04-12 | Stop reason: HOSPADM

## 2019-04-11 RX ORDER — CELECOXIB 200 MG/1
200 CAPSULE ORAL DAILY
COMMUNITY
End: 2021-03-27 | Stop reason: HOSPADM

## 2019-04-11 RX ADMIN — Medication 10 MCG/KG/MIN: at 09:21

## 2019-04-11 RX ADMIN — PERFLUTREN 8.48 MG: 6.52 INJECTION, SUSPENSION INTRAVENOUS at 09:21

## 2019-04-11 RX ADMIN — ATROPINE SULFATE 0.3 MG: 0.1 INJECTION PARENTERAL at 10:01

## 2019-04-12 LAB
BH CV STRESS BP STAGE 1: NORMAL
BH CV STRESS BP STAGE 2: NORMAL
BH CV STRESS BP STAGE 3: NORMAL
BH CV STRESS BP STAGE 4: NORMAL
BH CV STRESS DOB - ATROPINE STAGE 3: 0.3
BH CV STRESS DOSE DOBUTAMINE STAGE 1: 10
BH CV STRESS DOSE DOBUTAMINE STAGE 2: 20
BH CV STRESS DOSE DOBUTAMINE STAGE 3: 30
BH CV STRESS DOSE DOBUTAMINE STAGE 4: 40
BH CV STRESS DOSE DOBUTAMINE STAGE 5: 50
BH CV STRESS DURATION MIN STAGE 1: 3
BH CV STRESS DURATION MIN STAGE 2: 3
BH CV STRESS DURATION MIN STAGE 3: 3
BH CV STRESS DURATION MIN STAGE 5: 2
BH CV STRESS DURATION SEC STAGE 1: 0
BH CV STRESS DURATION SEC STAGE 2: 0
BH CV STRESS DURATION SEC STAGE 3: 0
BH CV STRESS DURATION SEC STAGE 4: 13
BH CV STRESS DURATION SEC STAGE 5: 0
BH CV STRESS HR STAGE 1: 73
BH CV STRESS HR STAGE 2: 93
BH CV STRESS HR STAGE 3: 134
BH CV STRESS HR STAGE 4: 136
BH CV STRESS PROTOCOL 1: NORMAL
BH CV STRESS RECOVERY BP: NORMAL MMHG
BH CV STRESS RECOVERY HR: 99 BPM
BH CV STRESS STAGE 1: 1
BH CV STRESS STAGE 2: 2
BH CV STRESS STAGE 3: 3
BH CV STRESS STAGE 4: 4
BH CV STRESS STAGE 5: 5
MAXIMAL PREDICTED HEART RATE: 156 BPM
PERCENT MAX PREDICTED HR: 87.18 %
STRESS BASELINE BP: NORMAL MMHG
STRESS BASELINE HR: 80 BPM
STRESS PERCENT HR: 103 %
STRESS POST EXERCISE DUR MIN: 9 MIN
STRESS POST EXERCISE DUR SEC: 13 SEC
STRESS POST PEAK BP: NORMAL MMHG
STRESS POST PEAK HR: 136 BPM
STRESS TARGET HR: 133 BPM

## 2019-05-16 ENCOUNTER — TELEPHONE (OUTPATIENT)
Dept: PODIATRY | Facility: CLINIC | Age: 64
End: 2019-05-16

## 2019-05-16 NOTE — TELEPHONE ENCOUNTER
Called pt for appt reminder, pt requested to rescheduled appt.  I have pt coming to see Dr. Marrero on 05/29/19 , pt voiced understanding

## 2019-05-20 ENCOUNTER — TELEPHONE (OUTPATIENT)
Dept: PODIATRY | Facility: CLINIC | Age: 64
End: 2019-05-20

## 2019-05-20 NOTE — TELEPHONE ENCOUNTER
Notified patient of appointment time change on May 29, 2019 to 10:30 am due to Dr. Marrero being in surgery during his originally scheduled appointment time. Patient gave verbal understanding of new appointment time.

## 2019-05-21 NOTE — PROGRESS NOTES
[facility] - PODIATRY    Today's Date: 05/29/19    Patient Name: Ryan Sanchez  MRN: [id number]  CSN: [id number]  PCP: Temo Mireles APRN  Referring Provider: No ref. provider found    SUBJECTIVE     Chief Complaint   Patient presents with   • Diabetes     Patient is here to diabetic foot and nail care. Patient complains of foot pain. Last stated BG level of 130mg/dl. Denies pain today. PCP: Temo CHARLES last visit 04/04/2019     HPI: Ryan Sanchez, a 63 y.o.male, comes to clinic as a(n) established patient presenting for diabetic foot exam and complaining of foot pain. Patient has h/o arthritis, akers's esophagus, DM, HTN, kidney stones, melanoma, mitral valve prolapse. He feels only occasional numbness and tingling in feet. Denies wounds or sores. Relates continued off and on plantar fascial pain. He has not been doing as much stretching and icing as he should. He wore out his powerstep inserts and has not replaced them. Has most discomfort with first step of the morning.  Denies injury or trauma. Patient is NIDDM with last stated BG level of 130mg/dl. Denies pain today. Able to care of his own nails at home. Denies any constitutional symptoms. No other pedal complaints at this time.    Past Medical History:   Diagnosis Date   • Arthritis    • Akers esophagus    • Bladder stone    • Diabetes mellitus (CMS/HCC)    • Hx of colonic polyps    • Hypertension    • Kidney stones    • Melanoma (CMS/HCC)    • Mitral valve prolapse    • PONV (postoperative nausea and vomiting)      Past Surgical History:   Procedure Laterality Date   • BLADDER NECK CONTRACTURE REPAIR N/A 12/2/2016    Procedure: BLADDER NECK CONTRACTURE REPAIR;  Surgeon: Ryan Lewis MD;  Location: [facility];  Service:    • COLONOSCOPY W/ POLYPECTOMY  09/10/2015    Tubular adenoma at 30 cm repeat exam in 5 years   • CYSTOSCOPY BLADDER STONE LITHOTRIPSY  2011   • CYSTOSCOPY W/ URETERAL STENT REMOVAL N/A 12/2/2016     Procedure: CYSTOLITHOLAPAXY WITH LASER and TRANURETHRAL INCISION OF BLADDER NECK CONTRACTURE;  Surgeon: Ryan Lewis MD;  Location: [facility] OR;  Service:    • ENDOSCOPY  07/14/2014    HH, Reflux esophagitis   • ENDOSCOPY N/A 10/5/2017    Procedure: ESOPHAGOGASTRODUODENOSCOPY WITH ANESTHESIA;  Surgeon: Joshua Zuleta MD;  Location:  [facility];  Service:    • ENDOSCOPY N/A 1/2/2018    Procedure: ESOPHAGOGASTRODUODENOSCOPY WITH ANESTHESIA;  Surgeon: Joshua Zuleta MD;  Location:  [facility];  Service:    • VASECTOMY       Family History   Problem Relation Age of Onset   • No Known Problems Father    • No Known Problems Mother    • Colon cancer Maternal Grandfather      Social History     Socioeconomic History   • Marital status: Single     Spouse name: Not on file   • Number of children: Not on file   • Years of education: Not on file   • Highest education level: Not on file   Tobacco Use   • Smoking status: Never Smoker   • Smokeless tobacco: Never Used   Substance and Sexual Activity   • Alcohol use: Yes     Comment: daily   • Drug use: No   • Sexual activity: Defer     No Known Allergies  Current Outpatient Medications   Medication Sig Dispense Refill   • acetaminophen (TYLENOL) 500 MG tablet Take 500 mg by mouth As Needed for mild pain (1-3).     • amLODIPine (NORVASC) 5 MG tablet Take 5 mg by mouth Daily.     • amoxicillin-clavulanate (AUGMENTIN) 875-125 MG per tablet Take 1 tablet by mouth 2 (Two) Times a Day.  0   • celecoxib (CeleBREX) 200 MG capsule Take 200 mg by mouth Daily.     • lisinopril (PRINIVIL,ZESTRIL) 2.5 MG tablet Take 2.5 mg by mouth Daily.     • loratadine-pseudoephedrine (CLARITIN-D 24 HOUR)  MG per 24 hr tablet Take 1 tablet by mouth Daily.     • metFORMIN (GLUCOPHAGE) 500 MG tablet Take 500 mg by mouth 2 (Two) Times a Day With Meals.     • pantoprazole (PROTONIX) 40 MG EC tablet Take 1 tablet by mouth 2 (Two) Times a Day. 180 tablet 3     Current Facility-Administered  Medications   Medication Dose Route Frequency Provider Last Rate Last Dose   • lidocaine (XYLOCAINE) 2 % jelly   Topical PRN Ryan Lewis MD         Review of Systems   Constitutional: Negative for chills and fever.   HENT: Negative for congestion.    Respiratory: Negative for shortness of breath.    Cardiovascular: Negative for chest pain and leg swelling.   Gastrointestinal: Negative for constipation, diarrhea, nausea and vomiting.   Musculoskeletal:        Foot pain   Skin: Negative for wound.   Neurological: Positive for numbness.       OBJECTIVE     Vitals:    05/29/19 1049   BP: 138/62   Pulse: 74   SpO2: 92%       PHYSICAL EXAM  GEN:   A&Ox3, NAD. Pt presents to clinic ambulating without assistance and wearing Casual Shoes without inserts.      NEURO:   Protective sensation intact to 10/10 sites Right foot, 10/10 site Left foot using [place] monofilament  Light touch sensation diminished  No Tinel's or Villeux sign.    VASC:  Skin temperature Warm to Warm proximal to distal nagie  DP pulses 2/4 Right, 2/4 Left  PT pulses 2/4 Right, 2/4 Left  CFT 3 sec angie  Pedal hair growth present  no edema noted angie  Varicosities absent angie    MUSC/SKEL:  Muscle Strength Right foot Dorsiflexors 5/5, Plantarflexors 5/5, Evertors 5/5, Invertors 5/5  Muscle Strength Left foot Dorsiflexors 5/5, Plantarflexors 5/5, Evertors 5/5, Invertors 5/5  ROM of the 1st MTP is full without pain or crepitus  ROM of the MTJ is full without pain or crepitus    ROM of the STJ is full without pain or crepitus    ROM of the ankle joint is full without pain or crepitus    Mild POP to left and right medial band of plantar fascia and medial calcaneal tubercle. Mildly bowstrung on WB.  Rectus foot type   Gait pattern: Normal  No gross pedal musculoskeletal deformities noted.     DERM:  Pedal nails x10 are within normal limits of length and thickness  Webspaces are Clean, Dry, and Intact  Skin is normal in turgor and texture with no open  wounds or sores appreciated.      RADIOLOGY/NUCLEAR:  No results found.    LABORATORY/CULTURE RESULTS:      PATHOLOGY RESULTS:       ASSESSMENT/PLAN     Ryan was seen today for diabetes.    Diagnoses and all orders for this visit:    Controlled type 2 diabetes mellitus without complication, without long-term current use of insulin (CMS/[facility])    Plantar fasciitis, bilateral    Foot pain, bilateral      Comprehensive lower extremity examination and evaluation was performed.  Discussed findings and treatment plan including risks, benefits, and treatment options with patient in detail. Patient agreed with treatment plan.  Patient may maintain nails and calluses at home utilizing emery board or pumice stone between visits as needed  Reviewed at home diabetic foot care including daily foot checks  Conservative therapy including daily stretching (demonstrated proper way of performing), icing 3x daily, decreased activity, and nsaids PRN.   Advised to continue use of PowerStep Inserts. Will need to replace every 3-6 months.   At this time, patient is mostly NV intact and at low risk for DM foot complications  An After Visit Summary was printed and given to the patient at discharge, including (if requested) any available informative/educational handouts regarding diagnosis, treatment, or medications. All questions were answered to patient/family satisfaction. Should symptoms fail to improve or worsen they agree to call or return to clinic or to go to the Emergency Department. Discussed the importance of following up with any needed screening tests/labs/specialist appointments and any requested follow-up recommended by me today. Importance of maintaining follow-up discussed and patient accepts that missed appointments can delay diagnosis and potentially lead to worsening of conditions.  Return in about 1 year (around 5/29/2020)., or sooner if acute issues arise.        This document has been electronically signed by Mayo  NABEEL Marrero DPM on May 29, 2019 11:15 AM

## 2019-05-28 ENCOUNTER — TELEPHONE (OUTPATIENT)
Dept: PODIATRY | Facility: CLINIC | Age: 64
End: 2019-05-28

## 2019-05-28 NOTE — TELEPHONE ENCOUNTER
Called and reminded patient of appointment with Dr. Melo Marrero on May 29, 2019 at 10:30 am. Patient gave verbal confirmation of appointment at this time.

## 2019-05-29 ENCOUNTER — OFFICE VISIT (OUTPATIENT)
Dept: PODIATRY | Facility: CLINIC | Age: 64
End: 2019-05-29

## 2019-05-29 VITALS
HEART RATE: 74 BPM | DIASTOLIC BLOOD PRESSURE: 62 MMHG | OXYGEN SATURATION: 92 % | SYSTOLIC BLOOD PRESSURE: 138 MMHG | BODY MASS INDEX: 25.01 KG/M2 | HEIGHT: 68 IN | WEIGHT: 165 LBS

## 2019-05-29 DIAGNOSIS — M79.672 FOOT PAIN, BILATERAL: ICD-10-CM

## 2019-05-29 DIAGNOSIS — M79.671 FOOT PAIN, BILATERAL: ICD-10-CM

## 2019-05-29 DIAGNOSIS — M72.2 PLANTAR FASCIITIS, BILATERAL: ICD-10-CM

## 2019-05-29 DIAGNOSIS — E11.9 CONTROLLED TYPE 2 DIABETES MELLITUS WITHOUT COMPLICATION, WITHOUT LONG-TERM CURRENT USE OF INSULIN (HCC): Primary | ICD-10-CM

## 2019-05-29 PROCEDURE — 99213 OFFICE O/P EST LOW 20 MIN: CPT | Performed by: PODIATRIST

## 2019-05-29 RX ORDER — AMOXICILLIN AND CLAVULANATE POTASSIUM 875; 125 MG/1; MG/1
1 TABLET, FILM COATED ORAL 2 TIMES DAILY
Refills: 0 | Status: ON HOLD | COMMUNITY
Start: 2019-05-19 | End: 2020-12-28

## 2020-05-12 ENCOUNTER — TELEPHONE (OUTPATIENT)
Dept: PODIATRY | Facility: CLINIC | Age: 65
End: 2020-05-12

## 2020-06-26 ENCOUNTER — TELEPHONE (OUTPATIENT)
Dept: PODIATRY | Facility: CLINIC | Age: 65
End: 2020-06-26

## 2020-06-29 ENCOUNTER — TELEPHONE (OUTPATIENT)
Dept: VASCULAR SURGERY | Facility: CLINIC | Age: 65
End: 2020-06-29

## 2020-12-26 ENCOUNTER — HOSPITAL ENCOUNTER (INPATIENT)
Facility: HOSPITAL | Age: 65
LOS: 3 days | Discharge: HOME | End: 2020-12-29
Attending: INTERNAL MEDICINE | Admitting: INTERNAL MEDICINE

## 2020-12-26 ENCOUNTER — APPOINTMENT (OUTPATIENT)
Dept: CARDIOLOGY | Facility: HOSPITAL | Age: 65
End: 2020-12-26

## 2020-12-26 ENCOUNTER — APPOINTMENT (OUTPATIENT)
Dept: CT IMAGING | Facility: HOSPITAL | Age: 65
End: 2020-12-26

## 2020-12-26 ENCOUNTER — APPOINTMENT (OUTPATIENT)
Dept: GENERAL RADIOLOGY | Facility: HOSPITAL | Age: 65
End: 2020-12-26

## 2020-12-26 DIAGNOSIS — J90 PLEURAL EFFUSION: ICD-10-CM

## 2020-12-26 DIAGNOSIS — I51.3 THROMBUS OF LEFT ATRIAL APPENDAGE: ICD-10-CM

## 2020-12-26 DIAGNOSIS — I48.91 ATRIAL FIBRILLATION, UNSPECIFIED TYPE (HCC): Primary | ICD-10-CM

## 2020-12-26 PROBLEM — E87.70 VOLUME OVERLOAD: Status: ACTIVE | Noted: 2020-12-26

## 2020-12-26 PROBLEM — F10.10 ALCOHOL ABUSE: Status: ACTIVE | Noted: 2020-12-26

## 2020-12-26 LAB
ALBUMIN SERPL-MCNC: 4.1 G/DL (ref 3.5–5.2)
ALBUMIN/GLOB SERPL: 1.6 G/DL
ALP SERPL-CCNC: 83 U/L (ref 39–117)
ALT SERPL W P-5'-P-CCNC: 27 U/L (ref 1–41)
AMPHET+METHAMPHET UR QL: NEGATIVE
AMPHETAMINES UR QL: NEGATIVE
ANION GAP SERPL CALCULATED.3IONS-SCNC: 10 MMOL/L (ref 5–15)
APTT PPP: 32.6 SECONDS (ref 24.1–35)
AST SERPL-CCNC: 22 U/L (ref 1–40)
BARBITURATES UR QL SCN: NEGATIVE
BASOPHILS # BLD AUTO: 0.06 10*3/MM3 (ref 0–0.2)
BASOPHILS NFR BLD AUTO: 0.7 % (ref 0–1.5)
BENZODIAZ UR QL SCN: NEGATIVE
BILIRUB SERPL-MCNC: 1.1 MG/DL (ref 0–1.2)
BUN SERPL-MCNC: 19 MG/DL (ref 8–23)
BUN/CREAT SERPL: 21.6 (ref 7–25)
BUPRENORPHINE SERPL-MCNC: NEGATIVE NG/ML
CALCIUM SPEC-SCNC: 8.8 MG/DL (ref 8.6–10.5)
CANNABINOIDS SERPL QL: NEGATIVE
CHLORIDE SERPL-SCNC: 105 MMOL/L (ref 98–107)
CO2 SERPL-SCNC: 23 MMOL/L (ref 22–29)
COCAINE UR QL: NEGATIVE
CREAT SERPL-MCNC: 0.88 MG/DL (ref 0.76–1.27)
D DIMER PPP FEU-MCNC: 1.1 MG/L (FEU) (ref 0–0.5)
DEPRECATED RDW RBC AUTO: 46.1 FL (ref 37–54)
EOSINOPHIL # BLD AUTO: 0.17 10*3/MM3 (ref 0–0.4)
EOSINOPHIL NFR BLD AUTO: 2 % (ref 0.3–6.2)
ERYTHROCYTE [DISTWIDTH] IN BLOOD BY AUTOMATED COUNT: 13 % (ref 12.3–15.4)
ETHANOL UR QL: <0.01 %
GFR SERPL CREATININE-BSD FRML MDRD: 87 ML/MIN/1.73
GLOBULIN UR ELPH-MCNC: 2.5 GM/DL
GLUCOSE SERPL-MCNC: 278 MG/DL (ref 65–99)
HCT VFR BLD AUTO: 48 % (ref 37.5–51)
HGB BLD-MCNC: 16.3 G/DL (ref 13–17.7)
IMM GRANULOCYTES # BLD AUTO: 0.02 10*3/MM3 (ref 0–0.05)
IMM GRANULOCYTES NFR BLD AUTO: 0.2 % (ref 0–0.5)
INR PPP: 1.23 (ref 0.91–1.09)
LIPASE SERPL-CCNC: 38 U/L (ref 13–60)
LYMPHOCYTES # BLD AUTO: 1.82 10*3/MM3 (ref 0.7–3.1)
LYMPHOCYTES NFR BLD AUTO: 21.7 % (ref 19.6–45.3)
MCH RBC QN AUTO: 32.7 PG (ref 26.6–33)
MCHC RBC AUTO-ENTMCNC: 34 G/DL (ref 31.5–35.7)
MCV RBC AUTO: 96.4 FL (ref 79–97)
METHADONE UR QL SCN: NEGATIVE
MONOCYTES # BLD AUTO: 0.47 10*3/MM3 (ref 0.1–0.9)
MONOCYTES NFR BLD AUTO: 5.6 % (ref 5–12)
NEUTROPHILS NFR BLD AUTO: 5.84 10*3/MM3 (ref 1.7–7)
NEUTROPHILS NFR BLD AUTO: 69.8 % (ref 42.7–76)
NRBC BLD AUTO-RTO: 0 /100 WBC (ref 0–0.2)
NT-PROBNP SERPL-MCNC: 5827 PG/ML (ref 0–900)
OPIATES UR QL: NEGATIVE
OXYCODONE UR QL SCN: NEGATIVE
PCP UR QL SCN: NEGATIVE
PLATELET # BLD AUTO: 201 10*3/MM3 (ref 140–450)
PMV BLD AUTO: 10.5 FL (ref 6–12)
POTASSIUM SERPL-SCNC: 3.4 MMOL/L (ref 3.5–5.2)
PROPOXYPH UR QL: NEGATIVE
PROT SERPL-MCNC: 6.6 G/DL (ref 6–8.5)
PROTHROMBIN TIME: 15.1 SECONDS (ref 11.9–14.6)
RBC # BLD AUTO: 4.98 10*6/MM3 (ref 4.14–5.8)
SARS-COV-2 RNA PNL SPEC NAA+PROBE: NOT DETECTED
SODIUM SERPL-SCNC: 138 MMOL/L (ref 136–145)
T4 FREE SERPL-MCNC: 1.2 NG/DL (ref 0.93–1.7)
TRICYCLICS UR QL SCN: NEGATIVE
TROPONIN T SERPL-MCNC: <0.01 NG/ML (ref 0–0.03)
TSH SERPL DL<=0.05 MIU/L-ACNC: 2 UIU/ML (ref 0.27–4.2)
WBC # BLD AUTO: 8.38 10*3/MM3 (ref 3.4–10.8)

## 2020-12-26 PROCEDURE — 99223 1ST HOSP IP/OBS HIGH 75: CPT | Performed by: NURSE PRACTITIONER

## 2020-12-26 PROCEDURE — 93356 MYOCRD STRAIN IMG SPCKL TRCK: CPT

## 2020-12-26 PROCEDURE — 85610 PROTHROMBIN TIME: CPT | Performed by: NURSE PRACTITIONER

## 2020-12-26 PROCEDURE — 84439 ASSAY OF FREE THYROXINE: CPT | Performed by: NURSE PRACTITIONER

## 2020-12-26 PROCEDURE — 0 IOPAMIDOL PER 1 ML: Performed by: NURSE PRACTITIONER

## 2020-12-26 PROCEDURE — 84484 ASSAY OF TROPONIN QUANT: CPT | Performed by: INTERNAL MEDICINE

## 2020-12-26 PROCEDURE — 87635 SARS-COV-2 COVID-19 AMP PRB: CPT | Performed by: NURSE PRACTITIONER

## 2020-12-26 PROCEDURE — 83880 ASSAY OF NATRIURETIC PEPTIDE: CPT | Performed by: NURSE PRACTITIONER

## 2020-12-26 PROCEDURE — 93306 TTE W/DOPPLER COMPLETE: CPT

## 2020-12-26 PROCEDURE — 85730 THROMBOPLASTIN TIME PARTIAL: CPT | Performed by: NURSE PRACTITIONER

## 2020-12-26 PROCEDURE — 99285 EMERGENCY DEPT VISIT HI MDM: CPT

## 2020-12-26 PROCEDURE — 25010000002 FUROSEMIDE PER 20 MG: Performed by: INTERNAL MEDICINE

## 2020-12-26 PROCEDURE — 84443 ASSAY THYROID STIM HORMONE: CPT | Performed by: NURSE PRACTITIONER

## 2020-12-26 PROCEDURE — 85379 FIBRIN DEGRADATION QUANT: CPT | Performed by: NURSE PRACTITIONER

## 2020-12-26 PROCEDURE — 74177 CT ABD & PELVIS W/CONTRAST: CPT

## 2020-12-26 PROCEDURE — 71045 X-RAY EXAM CHEST 1 VIEW: CPT

## 2020-12-26 PROCEDURE — 25010000002 PERFLUTREN 6.52 MG/ML SUSPENSION: Performed by: INTERNAL MEDICINE

## 2020-12-26 PROCEDURE — 83690 ASSAY OF LIPASE: CPT | Performed by: NURSE PRACTITIONER

## 2020-12-26 PROCEDURE — 25010000002 ENOXAPARIN PER 10 MG: Performed by: NURSE PRACTITIONER

## 2020-12-26 PROCEDURE — 83036 HEMOGLOBIN GLYCOSYLATED A1C: CPT | Performed by: NURSE PRACTITIONER

## 2020-12-26 PROCEDURE — 93356 MYOCRD STRAIN IMG SPCKL TRCK: CPT | Performed by: INTERNAL MEDICINE

## 2020-12-26 PROCEDURE — 84484 ASSAY OF TROPONIN QUANT: CPT | Performed by: NURSE PRACTITIONER

## 2020-12-26 PROCEDURE — 71275 CT ANGIOGRAPHY CHEST: CPT

## 2020-12-26 PROCEDURE — 93010 ELECTROCARDIOGRAM REPORT: CPT | Performed by: INTERNAL MEDICINE

## 2020-12-26 PROCEDURE — 93306 TTE W/DOPPLER COMPLETE: CPT | Performed by: INTERNAL MEDICINE

## 2020-12-26 PROCEDURE — 85025 COMPLETE CBC W/AUTO DIFF WBC: CPT | Performed by: NURSE PRACTITIONER

## 2020-12-26 PROCEDURE — 80053 COMPREHEN METABOLIC PANEL: CPT | Performed by: NURSE PRACTITIONER

## 2020-12-26 PROCEDURE — 93005 ELECTROCARDIOGRAM TRACING: CPT | Performed by: EMERGENCY MEDICINE

## 2020-12-26 PROCEDURE — 80307 DRUG TEST PRSMV CHEM ANLYZR: CPT | Performed by: NURSE PRACTITIONER

## 2020-12-26 RX ORDER — SODIUM CHLORIDE 0.9 % (FLUSH) 0.9 %
10 SYRINGE (ML) INJECTION AS NEEDED
Status: DISCONTINUED | OUTPATIENT
Start: 2020-12-26 | End: 2020-12-29 | Stop reason: HOSPADM

## 2020-12-26 RX ORDER — SODIUM CHLORIDE 0.9 % (FLUSH) 0.9 %
10 SYRINGE (ML) INJECTION EVERY 12 HOURS SCHEDULED
Status: DISCONTINUED | OUTPATIENT
Start: 2020-12-26 | End: 2020-12-29 | Stop reason: HOSPADM

## 2020-12-26 RX ORDER — LORAZEPAM 2 MG/ML
0.5 INJECTION INTRAMUSCULAR
Status: DISCONTINUED | OUTPATIENT
Start: 2020-12-26 | End: 2020-12-29 | Stop reason: HOSPADM

## 2020-12-26 RX ORDER — METHYLPREDNISOLONE 4 MG/1
TABLET ORAL ONCE
Status: ON HOLD | COMMUNITY
End: 2020-12-26

## 2020-12-26 RX ORDER — LISINOPRIL 2.5 MG/1
2.5 TABLET ORAL DAILY
Status: DISCONTINUED | OUTPATIENT
Start: 2020-12-26 | End: 2020-12-29 | Stop reason: HOSPADM

## 2020-12-26 RX ORDER — CELECOXIB 200 MG/1
200 CAPSULE ORAL DAILY
Status: DISCONTINUED | OUTPATIENT
Start: 2020-12-27 | End: 2020-12-29 | Stop reason: HOSPADM

## 2020-12-26 RX ORDER — DIPHENOXYLATE HYDROCHLORIDE AND ATROPINE SULFATE 2.5; .025 MG/1; MG/1
1 TABLET ORAL DAILY
Status: DISCONTINUED | OUTPATIENT
Start: 2020-12-27 | End: 2020-12-27 | Stop reason: SDUPTHER

## 2020-12-26 RX ORDER — ACETAMINOPHEN 325 MG/1
650 TABLET ORAL EVERY 4 HOURS PRN
Status: DISCONTINUED | OUTPATIENT
Start: 2020-12-26 | End: 2020-12-29 | Stop reason: HOSPADM

## 2020-12-26 RX ORDER — FUROSEMIDE 10 MG/ML
40 INJECTION INTRAMUSCULAR; INTRAVENOUS EVERY 12 HOURS SCHEDULED
Status: DISCONTINUED | OUTPATIENT
Start: 2020-12-26 | End: 2020-12-28

## 2020-12-26 RX ORDER — PANTOPRAZOLE SODIUM 40 MG/1
40 TABLET, DELAYED RELEASE ORAL
Status: DISCONTINUED | OUTPATIENT
Start: 2020-12-27 | End: 2020-12-29 | Stop reason: HOSPADM

## 2020-12-26 RX ORDER — FOLIC ACID 1 MG/1
1 TABLET ORAL DAILY
Status: COMPLETED | OUTPATIENT
Start: 2020-12-27 | End: 2020-12-29

## 2020-12-26 RX ORDER — LORAZEPAM 2 MG/ML
1 INJECTION INTRAMUSCULAR
Status: DISCONTINUED | OUTPATIENT
Start: 2020-12-26 | End: 2020-12-29 | Stop reason: HOSPADM

## 2020-12-26 RX ORDER — NITROGLYCERIN 0.4 MG/1
0.4 TABLET SUBLINGUAL
Status: DISCONTINUED | OUTPATIENT
Start: 2020-12-26 | End: 2020-12-29 | Stop reason: HOSPADM

## 2020-12-26 RX ORDER — LORAZEPAM 1 MG/1
1 TABLET ORAL
Status: DISCONTINUED | OUTPATIENT
Start: 2020-12-26 | End: 2020-12-29 | Stop reason: HOSPADM

## 2020-12-26 RX ORDER — ACETAMINOPHEN 650 MG/1
650 SUPPOSITORY RECTAL EVERY 4 HOURS PRN
Status: DISCONTINUED | OUTPATIENT
Start: 2020-12-26 | End: 2020-12-29 | Stop reason: HOSPADM

## 2020-12-26 RX ORDER — FLUTICASONE PROPIONATE 110 UG/1
1 AEROSOL, METERED RESPIRATORY (INHALATION)
Status: ON HOLD | COMMUNITY
End: 2020-12-28

## 2020-12-26 RX ORDER — ONDANSETRON 2 MG/ML
4 INJECTION INTRAMUSCULAR; INTRAVENOUS EVERY 6 HOURS PRN
Status: DISCONTINUED | OUTPATIENT
Start: 2020-12-26 | End: 2020-12-29 | Stop reason: HOSPADM

## 2020-12-26 RX ORDER — LORAZEPAM 0.5 MG/1
0.5 TABLET ORAL
Status: DISCONTINUED | OUTPATIENT
Start: 2020-12-26 | End: 2020-12-29 | Stop reason: HOSPADM

## 2020-12-26 RX ORDER — ACETAMINOPHEN 160 MG/5ML
650 SOLUTION ORAL EVERY 4 HOURS PRN
Status: DISCONTINUED | OUTPATIENT
Start: 2020-12-26 | End: 2020-12-29 | Stop reason: HOSPADM

## 2020-12-26 RX ADMIN — IOPAMIDOL 100 ML: 755 INJECTION, SOLUTION INTRAVENOUS at 10:15

## 2020-12-26 RX ADMIN — DILTIAZEM HYDROCHLORIDE 5 MG/HR: 5 INJECTION INTRAVENOUS at 18:38

## 2020-12-26 RX ADMIN — PERFLUTREN 1.17 MG: 6.52 INJECTION, SUSPENSION INTRAVENOUS at 16:30

## 2020-12-26 RX ADMIN — FUROSEMIDE 40 MG: 10 INJECTION, SOLUTION INTRAVENOUS at 13:42

## 2020-12-26 RX ADMIN — DILTIAZEM HYDROCHLORIDE 5 MG/HR: 5 INJECTION INTRAVENOUS at 08:41

## 2020-12-26 RX ADMIN — ENOXAPARIN SODIUM 80 MG: 80 INJECTION SUBCUTANEOUS at 11:41

## 2020-12-26 RX ADMIN — SODIUM CHLORIDE, PRESERVATIVE FREE 10 ML: 5 INJECTION INTRAVENOUS at 20:35

## 2020-12-27 ENCOUNTER — APPOINTMENT (OUTPATIENT)
Dept: ULTRASOUND IMAGING | Facility: HOSPITAL | Age: 65
End: 2020-12-27

## 2020-12-27 PROBLEM — I50.41 ACUTE COMBINED SYSTOLIC AND DIASTOLIC CONGESTIVE HEART FAILURE: Status: ACTIVE | Noted: 2020-12-26

## 2020-12-27 LAB
ANION GAP SERPL CALCULATED.3IONS-SCNC: 12 MMOL/L (ref 5–15)
BH CV ECHO MEAS - AO MAX PG (FULL): 5.9 MMHG
BH CV ECHO MEAS - AO MAX PG: 10.1 MMHG
BH CV ECHO MEAS - AO MEAN PG (FULL): 4 MMHG
BH CV ECHO MEAS - AO MEAN PG: 6 MMHG
BH CV ECHO MEAS - AO ROOT AREA (BSA CORRECTED): 1.6
BH CV ECHO MEAS - AO ROOT AREA: 7.1 CM^2
BH CV ECHO MEAS - AO ROOT DIAM: 3 CM
BH CV ECHO MEAS - AO V2 MAX: 159 CM/SEC
BH CV ECHO MEAS - AO V2 MEAN: 110 CM/SEC
BH CV ECHO MEAS - AO V2 VTI: 24.6 CM
BH CV ECHO MEAS - AVA(I,A): 1.5 CM^2
BH CV ECHO MEAS - AVA(I,D): 1.5 CM^2
BH CV ECHO MEAS - AVA(V,A): 1.5 CM^2
BH CV ECHO MEAS - AVA(V,D): 1.5 CM^2
BH CV ECHO MEAS - BSA(HAYCOCK): 1.9 M^2
BH CV ECHO MEAS - BSA: 1.9 M^2
BH CV ECHO MEAS - BZI_BMI: 25.8 KILOGRAMS/M^2
BH CV ECHO MEAS - BZI_METRIC_HEIGHT: 170.2 CM
BH CV ECHO MEAS - BZI_METRIC_WEIGHT: 74.8 KG
BH CV ECHO MEAS - EDV(CUBED): 121.3 ML
BH CV ECHO MEAS - EDV(MOD-SP4): 111 ML
BH CV ECHO MEAS - EDV(TEICH): 115.5 ML
BH CV ECHO MEAS - EF(CUBED): 46.8 %
BH CV ECHO MEAS - EF(MOD-SP4): 40.3 %
BH CV ECHO MEAS - EF(TEICH): 39 %
BH CV ECHO MEAS - ESV(CUBED): 64.5 ML
BH CV ECHO MEAS - ESV(MOD-SP4): 66.3 ML
BH CV ECHO MEAS - ESV(TEICH): 70.4 ML
BH CV ECHO MEAS - FS: 19 %
BH CV ECHO MEAS - IVS/LVPW: 1
BH CV ECHO MEAS - IVSD: 0.72 CM
BH CV ECHO MEAS - LA DIMENSION: 4.1 CM
BH CV ECHO MEAS - LA/AO: 1.4
BH CV ECHO MEAS - LAT PEAK E' VEL: 6.8 CM/SEC
BH CV ECHO MEAS - LV DIASTOLIC VOL/BSA (35-75): 59.6 ML/M^2
BH CV ECHO MEAS - LV MASS(C)D: 113.3 GRAMS
BH CV ECHO MEAS - LV MASS(C)DI: 60.8 GRAMS/M^2
BH CV ECHO MEAS - LV MAX PG: 4.2 MMHG
BH CV ECHO MEAS - LV MEAN PG: 2 MMHG
BH CV ECHO MEAS - LV SYSTOLIC VOL/BSA (12-30): 35.6 ML/M^2
BH CV ECHO MEAS - LV V1 MAX: 103 CM/SEC
BH CV ECHO MEAS - LV V1 MEAN: 68.3 CM/SEC
BH CV ECHO MEAS - LV V1 VTI: 16.3 CM
BH CV ECHO MEAS - LVIDD: 5 CM
BH CV ECHO MEAS - LVIDS: 4 CM
BH CV ECHO MEAS - LVLD AP4: 8 CM
BH CV ECHO MEAS - LVLS AP4: 7.5 CM
BH CV ECHO MEAS - LVOT AREA (M): 2.3 CM^2
BH CV ECHO MEAS - LVOT AREA: 2.3 CM^2
BH CV ECHO MEAS - LVOT DIAM: 1.7 CM
BH CV ECHO MEAS - LVPWD: 0.69 CM
BH CV ECHO MEAS - MED PEAK E' VEL: 4.65 CM/SEC
BH CV ECHO MEAS - MR MAX PG: 60.4 MMHG
BH CV ECHO MEAS - MR MAX VEL: 388 CM/SEC
BH CV ECHO MEAS - MR MEAN PG: 42.5 MMHG
BH CV ECHO MEAS - MR MEAN VEL: 306.5 CM/SEC
BH CV ECHO MEAS - MR VTI: 107.5 CM
BH CV ECHO MEAS - MV DEC TIME: 0.17 SEC
BH CV ECHO MEAS - MV E MAX VEL: 103 CM/SEC
BH CV ECHO MEAS - SI(AO): 93.3 ML/M^2
BH CV ECHO MEAS - SI(CUBED): 30.5 ML/M^2
BH CV ECHO MEAS - SI(LVOT): 19.9 ML/M^2
BH CV ECHO MEAS - SI(MOD-SP4): 24 ML/M^2
BH CV ECHO MEAS - SI(TEICH): 24.2 ML/M^2
BH CV ECHO MEAS - SV(AO): 173.9 ML
BH CV ECHO MEAS - SV(CUBED): 56.8 ML
BH CV ECHO MEAS - SV(LVOT): 37 ML
BH CV ECHO MEAS - SV(MOD-SP4): 44.7 ML
BH CV ECHO MEAS - SV(TEICH): 45.1 ML
BH CV ECHO MEAS - TR MAX VEL: 393 CM/SEC
BH CV ECHO MEASUREMENTS AVERAGE E/E' RATIO: 17.99
BUN SERPL-MCNC: 15 MG/DL (ref 8–23)
BUN/CREAT SERPL: 16.3 (ref 7–25)
CALCIUM SPEC-SCNC: 8.8 MG/DL (ref 8.6–10.5)
CHLORIDE SERPL-SCNC: 102 MMOL/L (ref 98–107)
CO2 SERPL-SCNC: 23 MMOL/L (ref 22–29)
CREAT SERPL-MCNC: 0.92 MG/DL (ref 0.76–1.27)
GFR SERPL CREATININE-BSD FRML MDRD: 82 ML/MIN/1.73
GLUCOSE BLDC GLUCOMTR-MCNC: 109 MG/DL (ref 70–130)
GLUCOSE BLDC GLUCOMTR-MCNC: 144 MG/DL (ref 70–130)
GLUCOSE BLDC GLUCOMTR-MCNC: 183 MG/DL (ref 70–130)
GLUCOSE BLDC GLUCOMTR-MCNC: 198 MG/DL (ref 70–130)
GLUCOSE SERPL-MCNC: 145 MG/DL (ref 65–99)
HBA1C MFR BLD: 6.7 % (ref 4.8–5.6)
LEFT ATRIUM VOLUME INDEX: 40.6 ML/M2
LEFT ATRIUM VOLUME: 75.5 CM3
MAGNESIUM SERPL-MCNC: 2 MG/DL (ref 1.6–2.4)
POTASSIUM SERPL-SCNC: 3.2 MMOL/L (ref 3.5–5.2)
SODIUM SERPL-SCNC: 137 MMOL/L (ref 136–145)
TROPONIN T SERPL-MCNC: <0.01 NG/ML (ref 0–0.03)

## 2020-12-27 PROCEDURE — 99233 SBSQ HOSP IP/OBS HIGH 50: CPT | Performed by: NURSE PRACTITIONER

## 2020-12-27 PROCEDURE — 25010000002 FUROSEMIDE PER 20 MG: Performed by: INTERNAL MEDICINE

## 2020-12-27 PROCEDURE — 93970 EXTREMITY STUDY: CPT | Performed by: SURGERY

## 2020-12-27 PROCEDURE — 63710000001 INSULIN LISPRO (HUMAN) PER 5 UNITS: Performed by: NURSE PRACTITIONER

## 2020-12-27 PROCEDURE — 82962 GLUCOSE BLOOD TEST: CPT

## 2020-12-27 PROCEDURE — 93970 EXTREMITY STUDY: CPT

## 2020-12-27 PROCEDURE — 80048 BASIC METABOLIC PNL TOTAL CA: CPT | Performed by: INTERNAL MEDICINE

## 2020-12-27 PROCEDURE — 25010000002 ENOXAPARIN PER 10 MG: Performed by: INTERNAL MEDICINE

## 2020-12-27 PROCEDURE — 94799 UNLISTED PULMONARY SVC/PX: CPT

## 2020-12-27 PROCEDURE — 25010000002 FUROSEMIDE PER 20 MG: Performed by: NURSE PRACTITIONER

## 2020-12-27 PROCEDURE — 83735 ASSAY OF MAGNESIUM: CPT | Performed by: NURSE PRACTITIONER

## 2020-12-27 RX ORDER — FUROSEMIDE 10 MG/ML
40 INJECTION INTRAMUSCULAR; INTRAVENOUS ONCE
Status: COMPLETED | OUTPATIENT
Start: 2020-12-27 | End: 2020-12-27

## 2020-12-27 RX ORDER — POTASSIUM CHLORIDE 750 MG/1
40 CAPSULE, EXTENDED RELEASE ORAL ONCE
Status: COMPLETED | OUTPATIENT
Start: 2020-12-27 | End: 2020-12-27

## 2020-12-27 RX ORDER — MULTIPLE VITAMINS W/ MINERALS TAB 9MG-400MCG
1 TAB ORAL DAILY
Status: COMPLETED | OUTPATIENT
Start: 2020-12-27 | End: 2020-12-29

## 2020-12-27 RX ORDER — DEXTROSE MONOHYDRATE 25 G/50ML
25 INJECTION, SOLUTION INTRAVENOUS
Status: DISCONTINUED | OUTPATIENT
Start: 2020-12-27 | End: 2020-12-29 | Stop reason: HOSPADM

## 2020-12-27 RX ORDER — NICOTINE POLACRILEX 4 MG
15 LOZENGE BUCCAL
Status: DISCONTINUED | OUTPATIENT
Start: 2020-12-27 | End: 2020-12-29 | Stop reason: HOSPADM

## 2020-12-27 RX ORDER — DILTIAZEM HYDROCHLORIDE 60 MG/1
60 TABLET, FILM COATED ORAL EVERY 6 HOURS SCHEDULED
Status: DISCONTINUED | OUTPATIENT
Start: 2020-12-27 | End: 2020-12-28

## 2020-12-27 RX ADMIN — Medication 100 MG: at 10:07

## 2020-12-27 RX ADMIN — INSULIN LISPRO 2 UNITS: 100 INJECTION, SOLUTION INTRAVENOUS; SUBCUTANEOUS at 12:16

## 2020-12-27 RX ADMIN — FUROSEMIDE 40 MG: 10 INJECTION, SOLUTION INTRAVENOUS at 12:15

## 2020-12-27 RX ADMIN — POTASSIUM CHLORIDE 40 MEQ: 750 CAPSULE, EXTENDED RELEASE ORAL at 12:14

## 2020-12-27 RX ADMIN — RIVAROXABAN 15 MG: 15 TABLET, FILM COATED ORAL at 18:35

## 2020-12-27 RX ADMIN — Medication 1 TABLET: at 10:07

## 2020-12-27 RX ADMIN — DILTIAZEM HYDROCHLORIDE 15 MG/HR: 5 INJECTION INTRAVENOUS at 05:35

## 2020-12-27 RX ADMIN — FUROSEMIDE 40 MG: 10 INJECTION, SOLUTION INTRAVENOUS at 06:09

## 2020-12-27 RX ADMIN — ACETAMINOPHEN 650 MG: 325 TABLET, FILM COATED ORAL at 02:40

## 2020-12-27 RX ADMIN — DILTIAZEM HYDROCHLORIDE 60 MG: 60 TABLET, FILM COATED ORAL at 18:35

## 2020-12-27 RX ADMIN — SODIUM CHLORIDE, PRESERVATIVE FREE 10 ML: 5 INJECTION INTRAVENOUS at 20:58

## 2020-12-27 RX ADMIN — LISINOPRIL 2.5 MG: 2.5 TABLET ORAL at 10:07

## 2020-12-27 RX ADMIN — FOLIC ACID 1 MG: 1 TABLET ORAL at 10:07

## 2020-12-27 RX ADMIN — DILTIAZEM HYDROCHLORIDE 60 MG: 60 TABLET, FILM COATED ORAL at 14:11

## 2020-12-27 RX ADMIN — PANTOPRAZOLE SODIUM 40 MG: 40 TABLET, DELAYED RELEASE ORAL at 07:12

## 2020-12-27 RX ADMIN — ACETAMINOPHEN 650 MG: 325 TABLET, FILM COATED ORAL at 20:58

## 2020-12-27 RX ADMIN — POTASSIUM CHLORIDE 40 MEQ: 750 CAPSULE, EXTENDED RELEASE ORAL at 10:07

## 2020-12-27 RX ADMIN — FUROSEMIDE 40 MG: 10 INJECTION, SOLUTION INTRAVENOUS at 18:35

## 2020-12-27 RX ADMIN — ENOXAPARIN SODIUM 80 MG: 80 INJECTION SUBCUTANEOUS at 02:36

## 2020-12-28 LAB
ANION GAP SERPL CALCULATED.3IONS-SCNC: 11 MMOL/L (ref 5–15)
BUN SERPL-MCNC: 21 MG/DL (ref 8–23)
BUN/CREAT SERPL: 15.7 (ref 7–25)
CALCIUM SPEC-SCNC: 9 MG/DL (ref 8.6–10.5)
CHLORIDE SERPL-SCNC: 103 MMOL/L (ref 98–107)
CO2 SERPL-SCNC: 27 MMOL/L (ref 22–29)
CREAT SERPL-MCNC: 1.34 MG/DL (ref 0.76–1.27)
GFR SERPL CREATININE-BSD FRML MDRD: 53 ML/MIN/1.73
GLUCOSE BLDC GLUCOMTR-MCNC: 119 MG/DL (ref 70–130)
GLUCOSE BLDC GLUCOMTR-MCNC: 122 MG/DL (ref 70–130)
GLUCOSE BLDC GLUCOMTR-MCNC: 149 MG/DL (ref 70–130)
GLUCOSE BLDC GLUCOMTR-MCNC: 167 MG/DL (ref 70–130)
GLUCOSE SERPL-MCNC: 122 MG/DL (ref 65–99)
POTASSIUM SERPL-SCNC: 3.9 MMOL/L (ref 3.5–5.2)
QT INTERVAL: 272 MS
QTC INTERVAL: 458 MS
SODIUM SERPL-SCNC: 141 MMOL/L (ref 136–145)

## 2020-12-28 PROCEDURE — 99233 SBSQ HOSP IP/OBS HIGH 50: CPT | Performed by: NURSE PRACTITIONER

## 2020-12-28 PROCEDURE — 80048 BASIC METABOLIC PNL TOTAL CA: CPT | Performed by: INTERNAL MEDICINE

## 2020-12-28 PROCEDURE — 82962 GLUCOSE BLOOD TEST: CPT

## 2020-12-28 PROCEDURE — 25010000002 FUROSEMIDE PER 20 MG: Performed by: INTERNAL MEDICINE

## 2020-12-28 RX ORDER — METOPROLOL SUCCINATE 25 MG/1
25 TABLET, EXTENDED RELEASE ORAL
Status: DISCONTINUED | OUTPATIENT
Start: 2020-12-28 | End: 2020-12-29 | Stop reason: HOSPADM

## 2020-12-28 RX ORDER — DILTIAZEM HYDROCHLORIDE 180 MG/1
180 CAPSULE, COATED, EXTENDED RELEASE ORAL
Status: DISCONTINUED | OUTPATIENT
Start: 2020-12-28 | End: 2020-12-29 | Stop reason: HOSPADM

## 2020-12-28 RX ORDER — ALBUTEROL SULFATE 90 UG/1
2 AEROSOL, METERED RESPIRATORY (INHALATION) EVERY 6 HOURS PRN
COMMUNITY
End: 2021-03-25

## 2020-12-28 RX ADMIN — DILTIAZEM HYDROCHLORIDE 180 MG: 180 CAPSULE, COATED, EXTENDED RELEASE ORAL at 18:41

## 2020-12-28 RX ADMIN — DILTIAZEM HYDROCHLORIDE 60 MG: 60 TABLET, FILM COATED ORAL at 00:45

## 2020-12-28 RX ADMIN — DILTIAZEM HYDROCHLORIDE 60 MG: 60 TABLET, FILM COATED ORAL at 06:30

## 2020-12-28 RX ADMIN — RIVAROXABAN 15 MG: 15 TABLET, FILM COATED ORAL at 08:50

## 2020-12-28 RX ADMIN — FUROSEMIDE 40 MG: 10 INJECTION, SOLUTION INTRAVENOUS at 06:30

## 2020-12-28 RX ADMIN — RIVAROXABAN 15 MG: 15 TABLET, FILM COATED ORAL at 18:41

## 2020-12-28 RX ADMIN — PANTOPRAZOLE SODIUM 40 MG: 40 TABLET, DELAYED RELEASE ORAL at 06:30

## 2020-12-28 RX ADMIN — FOLIC ACID 1 MG: 1 TABLET ORAL at 08:50

## 2020-12-28 RX ADMIN — Medication 1 TABLET: at 08:50

## 2020-12-28 RX ADMIN — SODIUM CHLORIDE, PRESERVATIVE FREE 10 ML: 5 INJECTION INTRAVENOUS at 20:57

## 2020-12-28 RX ADMIN — Medication 100 MG: at 08:50

## 2020-12-28 RX ADMIN — DILTIAZEM HYDROCHLORIDE 60 MG: 60 TABLET, FILM COATED ORAL at 12:47

## 2020-12-28 RX ADMIN — METOPROLOL SUCCINATE 25 MG: 25 TABLET, EXTENDED RELEASE ORAL at 16:24

## 2020-12-29 VITALS
DIASTOLIC BLOOD PRESSURE: 89 MMHG | HEIGHT: 67 IN | RESPIRATION RATE: 16 BRPM | HEART RATE: 82 BPM | WEIGHT: 159.6 LBS | SYSTOLIC BLOOD PRESSURE: 120 MMHG | TEMPERATURE: 97.9 F | BODY MASS INDEX: 25.05 KG/M2 | OXYGEN SATURATION: 94 %

## 2020-12-29 LAB
ANION GAP SERPL CALCULATED.3IONS-SCNC: 11 MMOL/L (ref 5–15)
BUN SERPL-MCNC: 23 MG/DL (ref 8–23)
BUN/CREAT SERPL: 18.9 (ref 7–25)
CALCIUM SPEC-SCNC: 8.9 MG/DL (ref 8.6–10.5)
CHLORIDE SERPL-SCNC: 103 MMOL/L (ref 98–107)
CO2 SERPL-SCNC: 26 MMOL/L (ref 22–29)
CREAT SERPL-MCNC: 1.22 MG/DL (ref 0.76–1.27)
GFR SERPL CREATININE-BSD FRML MDRD: 59 ML/MIN/1.73
GLUCOSE BLDC GLUCOMTR-MCNC: 120 MG/DL (ref 70–130)
GLUCOSE SERPL-MCNC: 146 MG/DL (ref 65–99)
POTASSIUM SERPL-SCNC: 3.9 MMOL/L (ref 3.5–5.2)
SODIUM SERPL-SCNC: 140 MMOL/L (ref 136–145)

## 2020-12-29 PROCEDURE — 80048 BASIC METABOLIC PNL TOTAL CA: CPT | Performed by: INTERNAL MEDICINE

## 2020-12-29 PROCEDURE — 82962 GLUCOSE BLOOD TEST: CPT

## 2020-12-29 PROCEDURE — 99232 SBSQ HOSP IP/OBS MODERATE 35: CPT | Performed by: INTERNAL MEDICINE

## 2020-12-29 RX ORDER — DILTIAZEM HYDROCHLORIDE 180 MG/1
180 CAPSULE, COATED, EXTENDED RELEASE ORAL
Qty: 30 CAPSULE | Refills: 3 | Status: SHIPPED | OUTPATIENT
Start: 2020-12-30 | End: 2021-03-27 | Stop reason: HOSPADM

## 2020-12-29 RX ORDER — LISINOPRIL 2.5 MG/1
2.5 TABLET ORAL DAILY
Qty: 30 TABLET | Refills: 3 | Status: ON HOLD | OUTPATIENT
Start: 2020-12-29 | End: 2021-03-26

## 2020-12-29 RX ORDER — METOPROLOL SUCCINATE 25 MG/1
25 TABLET, EXTENDED RELEASE ORAL
Qty: 30 TABLET | Refills: 3 | Status: SHIPPED | OUTPATIENT
Start: 2020-12-30 | End: 2021-04-16

## 2020-12-29 RX ORDER — PANTOPRAZOLE SODIUM 40 MG/1
40 TABLET, DELAYED RELEASE ORAL DAILY
Qty: 30 TABLET | Refills: 3 | Status: SHIPPED | OUTPATIENT
Start: 2020-12-29 | End: 2021-07-06

## 2020-12-29 RX ORDER — FUROSEMIDE 40 MG/1
40 TABLET ORAL DAILY PRN
Qty: 30 TABLET | Refills: 3 | Status: SHIPPED | OUTPATIENT
Start: 2020-12-29 | End: 2022-11-17 | Stop reason: SDUPTHER

## 2020-12-29 RX ADMIN — CELECOXIB 200 MG: 200 CAPSULE ORAL at 08:04

## 2020-12-29 RX ADMIN — PANTOPRAZOLE SODIUM 40 MG: 40 TABLET, DELAYED RELEASE ORAL at 05:29

## 2020-12-29 RX ADMIN — DILTIAZEM HYDROCHLORIDE 180 MG: 180 CAPSULE, COATED, EXTENDED RELEASE ORAL at 08:03

## 2020-12-29 RX ADMIN — METOPROLOL SUCCINATE 25 MG: 25 TABLET, EXTENDED RELEASE ORAL at 08:04

## 2020-12-29 RX ADMIN — Medication 100 MG: at 08:03

## 2020-12-29 RX ADMIN — Medication 1 TABLET: at 08:04

## 2020-12-29 RX ADMIN — LISINOPRIL 2.5 MG: 2.5 TABLET ORAL at 08:04

## 2020-12-29 RX ADMIN — RIVAROXABAN 15 MG: 15 TABLET, FILM COATED ORAL at 08:03

## 2020-12-29 RX ADMIN — FOLIC ACID 1 MG: 1 TABLET ORAL at 08:04

## 2020-12-30 ENCOUNTER — READMISSION MANAGEMENT (OUTPATIENT)
Dept: CALL CENTER | Facility: HOSPITAL | Age: 65
End: 2020-12-30

## 2020-12-30 NOTE — OUTREACH NOTE
Prep Survey      Responses   [facility] facility patient discharged from?  [place]   Is LACE score < 7 ?  No   Emergency Room discharge w/ pulse ox?  No   Eligibility  Readm Mgmt   Discharge diagnosis  Atrial fibrillation with rapid ventricular response Acute combined systolic and diastolic congestive heart failure    Does the patient have one of the following disease processes/diagnoses(primary or secondary)?  CHF   Does the patient have Home health ordered?  No   Is there a DME ordered?  No   Prep survey completed?  Yes          Kandis Garcia RN

## 2021-01-04 ENCOUNTER — READMISSION MANAGEMENT (OUTPATIENT)
Dept: CALL CENTER | Facility: HOSPITAL | Age: 66
End: 2021-01-04

## 2021-01-04 NOTE — OUTREACH NOTE
CHF Week 1 Survey      Responses   [facility] patient discharged from?  [place]   Does the patient have one of the following disease processes/diagnoses(primary or secondary)?  CHF   CHF Week 1 attempt successful?  Yes   Call start time  1259   Call end time  1309   Discharge diagnosis  Atrial fibrillation with rapid ventricular response Acute combined systolic and diastolic congestive heart failure    Meds reviewed with patient/caregiver?  Yes   Is the patient having any side effects they believe may be caused by any medication additions or changes?  No   Does the patient have all medications ordered at discharge?  Yes   Is the patient taking all medications as directed (includes completed medication regime)?  No   What is preventing the patient from taking all medications as directed?  Other   Nursing Interventions  Nurse provided patient education   Medication comments  says he takes protonix prn--he will discuss with PCP at appt. Antibx for tooth abcess.   Does the patient have a primary care provider?   Yes   Does the patient have an appointment with their PCP within 7 days of discharge?  Yes   Has the patient kept scheduled appointments due by today?  N/A   Comments  appt tomorrow 1/5/2021. Went to dentist for abcess-- will go back on 1/14/21 and dentist will talk with cardiology and his PCP about this.    Has home health visited the patient within 72 hours of discharge?  N/A   Psychosocial issues?  No   Did the patient receive a copy of their discharge instructions?  Yes   Nursing interventions  Reviewed instructions with patient   What is the patient's perception of their health status since discharge?  Improving   Nursing interventions  Nurse provided patient education   Is the patient weighing daily?  Yes   Does the patient have scales?  Yes   Daily weight interventions  Education provided on importance of daily weight   Is the patient able to teach back Heart Failure diet management?  Yes   Is the  patient able to teach back Heart Failure Zones?  Yes   Is the patient able to teach back signs and symptoms of worsening condition? (i.e. weight gain, shortness of air, etc.)  Yes   Is the patient/caregiver able to teach back the hierarchy of who to call/visit for symptoms/problems? PCP, Specialist, Home health nurse, Urgent Care, ED, 911  Yes   Additional teach back comments  Doing well and advised to keep all appts    CHF Week 1 call completed?  Yes          Radha Chand RN

## 2021-01-13 ENCOUNTER — READMISSION MANAGEMENT (OUTPATIENT)
Dept: CALL CENTER | Facility: HOSPITAL | Age: 66
End: 2021-01-13

## 2021-01-13 NOTE — OUTREACH NOTE
CHF Week 2 Survey      Responses   [facility] patient discharged from?  [place]   Does the patient have one of the following disease processes/diagnoses(primary or secondary)?  CHF   Week 2 attempt successful?  No   Unsuccessful attempts  Attempt 1          Barbie Manuel RN

## 2021-01-15 ENCOUNTER — READMISSION MANAGEMENT (OUTPATIENT)
Dept: CALL CENTER | Facility: HOSPITAL | Age: 66
End: 2021-01-15

## 2021-01-15 NOTE — OUTREACH NOTE
CHF Week 2 Survey      Responses   [facility] patient discharged from?  [place]   Does the patient have one of the following disease processes/diagnoses(primary or secondary)?  CHF   Week 2 attempt successful?  No   Unsuccessful attempts  Attempt 2          Jimmy Dennis RN

## 2021-01-22 ENCOUNTER — READMISSION MANAGEMENT (OUTPATIENT)
Dept: CALL CENTER | Facility: HOSPITAL | Age: 66
End: 2021-01-22

## 2021-01-22 NOTE — OUTREACH NOTE
CHF Week 3 Survey      Responses   [facility] patient discharged from?  [place]   Does the patient have one of the following disease processes/diagnoses(primary or secondary)?  CHF   Week 3 attempt successful?  Yes   Call start time  1330   Call end time  1345   Discharge diagnosis  Atrial fibrillation with rapid ventricular response, Acute combined systolic and diastolic congestive heart failure    Is patient permission given to speak with other caregiver?  No   Meds reviewed with patient/caregiver?  Yes   Is the patient having any side effects they believe may be caused by any medication additions or changes?  No   Does the patient have all medications ordered at discharge?  Yes   Is the patient taking all medications as directed (includes completed medication regime)?  No   What is preventing the patient from taking all medications as directed?  Other [Patient has been taking lasix every day instead of as needed basis as ordered. ]   Nursing Interventions  Nurse provided patient education   Medication comments  Patient has also been holding his lisinopril if blood pressure is low.    Comments regarding appointments  Advised patient to contact cardiology for f/u appt. He was supposed to see cardiology 2-4 weeks post discharge.    Does the patient have a primary care provider?   Yes   Comments regarding PCP  PCP Temo CHARLES.    Has the patient kept scheduled appointments due by today?  Yes   Has home health visited the patient within 72 hours of discharge?  N/A   Pulse Ox monitoring  None   Psychosocial issues?  No   Did the patient receive a copy of their discharge instructions?  Yes   Nursing interventions  Reviewed instructions with patient   What is the patient's perception of their health status since discharge?  Improving   Nursing interventions  Nurse provided patient education   Is the patient weighing daily?  Yes   Does the patient have scales?  Yes   Daily weight interventions  Education  provided on importance of daily weight   Is the patient able to teach back Heart Failure diet management?  Yes   Is the patient able to teach back Heart Failure Zones?  Yes   Is the patient able to teach back signs and symptoms of worsening condition? (i.e. weight gain, shortness of air, etc.)  Yes   If the patient is a current smoker, are they able to teach back resources for cessation?  Not a smoker   Is the patient/caregiver able to teach back the hierarchy of who to call/visit for symptoms/problems? PCP, Specialist, Home health nurse, Urgent Care, ED, 911  Yes   CHF Week 3 call completed?  Yes   Wrap up additional comments  Patient states continuing to lose weight, but has been taking lasix daily instead of as needed basis. Reinforced teaching to take lasix with gain >2# overnight, worsening shortness of breath or swelling as per AVS.           Gin Goodson RN

## 2021-02-02 ENCOUNTER — READMISSION MANAGEMENT (OUTPATIENT)
Dept: CALL CENTER | Facility: HOSPITAL | Age: 66
End: 2021-02-02

## 2021-02-02 NOTE — OUTREACH NOTE
CHF Week 4 Survey      Responses   [facility] patient discharged from?  [place]   Does the patient have one of the following disease processes/diagnoses(primary or secondary)?  CHF   Week 4 attempt successful?  Yes   Call start time  0942   Call end time  0949   Discharge diagnosis  Atrial fibrillation with rapid ventricular response, Acute combined systolic and diastolic congestive heart failure    Meds reviewed with patient/caregiver?  Yes   Is the patient having any side effects they believe may be caused by any medication additions or changes?  No   Is the patient taking all medications as directed (includes completed medication regime)?  Yes   Has the patient kept scheduled appointments due by today?  Yes   Is the patient still receiving Home Health Services?  N/A   Pulse Ox monitoring  None   Psychosocial issues?  No   What is the patient's perception of their health status since discharge?  Improving   Nursing interventions  Nurse provided patient education   Is the patient weighing daily?  Yes   Does the patient have scales?  Yes   Is the patient able to teach back Heart Failure diet management?  Yes   Is the patient able to teach back Heart Failure Zones?  Yes   Is the patient able to teach back signs and symptoms of worsening condition? (i.e. weight gain, shortness of air, etc.)  Yes   Is the patient/caregiver able to teach back the hierarchy of who to call/visit for symptoms/problems? PCP, Specialist, Home health nurse, Urgent Care, ED, 911  Yes   Week 4 Call Completed?  Yes   Would the patient like one additional call?  No   Graduated  Yes   Is the patient interested in additional calls from an ambulatory ?  NOTE:  applies to high risk patients requiring additional follow-up.  No   Did the patient feel the follow up calls were helpful during their recovery period?  Yes   Was the number of calls appropriate?  Yes          Leonora Harris RN

## 2021-03-25 ENCOUNTER — APPOINTMENT (OUTPATIENT)
Dept: GENERAL RADIOLOGY | Facility: HOSPITAL | Age: 66
End: 2021-03-25

## 2021-03-25 ENCOUNTER — HOSPITAL ENCOUNTER (INPATIENT)
Facility: HOSPITAL | Age: 66
LOS: 2 days | Discharge: HOME | End: 2021-03-27
Attending: FAMILY MEDICINE | Admitting: FAMILY MEDICINE

## 2021-03-25 DIAGNOSIS — I48.91 ATRIAL FIBRILLATION WITH RVR (HCC): Primary | ICD-10-CM

## 2021-03-25 PROBLEM — F10.21 HISTORY OF ALCOHOL DEPENDENCE (HCC): Status: ACTIVE | Noted: 2021-03-25

## 2021-03-25 PROBLEM — I50.42 CHRONIC COMBINED SYSTOLIC AND DIASTOLIC CONGESTIVE HEART FAILURE: Status: ACTIVE | Noted: 2020-12-26

## 2021-03-25 PROBLEM — Z79.01 CHRONIC ANTICOAGULATION: Status: ACTIVE | Noted: 2021-03-25

## 2021-03-25 LAB
ALBUMIN SERPL-MCNC: 4.4 G/DL (ref 3.5–5.2)
ALBUMIN/GLOB SERPL: 1.7 G/DL
ALP SERPL-CCNC: 85 U/L (ref 39–117)
ALT SERPL W P-5'-P-CCNC: 23 U/L (ref 1–41)
ANION GAP SERPL CALCULATED.3IONS-SCNC: 12 MMOL/L (ref 5–15)
APTT PPP: 62.6 SECONDS (ref 24.1–35)
AST SERPL-CCNC: 21 U/L (ref 1–40)
BASOPHILS # BLD AUTO: 0.08 10*3/MM3 (ref 0–0.2)
BASOPHILS NFR BLD AUTO: 0.9 % (ref 0–1.5)
BILIRUB SERPL-MCNC: 0.8 MG/DL (ref 0–1.2)
BUN SERPL-MCNC: 15 MG/DL (ref 8–23)
BUN/CREAT SERPL: 16.1 (ref 7–25)
CALCIUM SPEC-SCNC: 9.5 MG/DL (ref 8.6–10.5)
CHLORIDE SERPL-SCNC: 102 MMOL/L (ref 98–107)
CO2 SERPL-SCNC: 25 MMOL/L (ref 22–29)
CREAT SERPL-MCNC: 0.93 MG/DL (ref 0.76–1.27)
DEPRECATED RDW RBC AUTO: 45.2 FL (ref 37–54)
EOSINOPHIL # BLD AUTO: 0.35 10*3/MM3 (ref 0–0.4)
EOSINOPHIL NFR BLD AUTO: 3.7 % (ref 0.3–6.2)
ERYTHROCYTE [DISTWIDTH] IN BLOOD BY AUTOMATED COUNT: 13.1 % (ref 12.3–15.4)
GFR SERPL CREATININE-BSD FRML MDRD: 81 ML/MIN/1.73
GLOBULIN UR ELPH-MCNC: 2.6 GM/DL
GLUCOSE BLDC GLUCOMTR-MCNC: 81 MG/DL (ref 70–130)
GLUCOSE SERPL-MCNC: 91 MG/DL (ref 65–99)
HCT VFR BLD AUTO: 48.5 % (ref 37.5–51)
HGB BLD-MCNC: 16.6 G/DL (ref 13–17.7)
HOLD SPECIMEN: NORMAL
HOLD SPECIMEN: NORMAL
IMM GRANULOCYTES # BLD AUTO: 0.02 10*3/MM3 (ref 0–0.05)
IMM GRANULOCYTES NFR BLD AUTO: 0.2 % (ref 0–0.5)
INR PPP: 2.79 (ref 0.91–1.09)
LYMPHOCYTES # BLD AUTO: 2.35 10*3/MM3 (ref 0.7–3.1)
LYMPHOCYTES NFR BLD AUTO: 25.1 % (ref 19.6–45.3)
MCH RBC QN AUTO: 32.2 PG (ref 26.6–33)
MCHC RBC AUTO-ENTMCNC: 34.2 G/DL (ref 31.5–35.7)
MCV RBC AUTO: 94.2 FL (ref 79–97)
MONOCYTES # BLD AUTO: 0.57 10*3/MM3 (ref 0.1–0.9)
MONOCYTES NFR BLD AUTO: 6.1 % (ref 5–12)
NEUTROPHILS NFR BLD AUTO: 6 10*3/MM3 (ref 1.7–7)
NEUTROPHILS NFR BLD AUTO: 64 % (ref 42.7–76)
NRBC BLD AUTO-RTO: 0 /100 WBC (ref 0–0.2)
NT-PROBNP SERPL-MCNC: 4334 PG/ML (ref 0–900)
PLATELET # BLD AUTO: 223 10*3/MM3 (ref 140–450)
PMV BLD AUTO: 10.5 FL (ref 6–12)
POTASSIUM SERPL-SCNC: 4.1 MMOL/L (ref 3.5–5.2)
PROT SERPL-MCNC: 7 G/DL (ref 6–8.5)
PROTHROMBIN TIME: 29.5 SECONDS (ref 11.9–14.6)
RBC # BLD AUTO: 5.15 10*6/MM3 (ref 4.14–5.8)
SARS-COV-2 RNA PNL SPEC NAA+PROBE: NOT DETECTED
SODIUM SERPL-SCNC: 139 MMOL/L (ref 136–145)
TROPONIN T SERPL-MCNC: <0.01 NG/ML (ref 0–0.03)
TROPONIN T SERPL-MCNC: <0.01 NG/ML (ref 0–0.03)
WBC # BLD AUTO: 9.37 10*3/MM3 (ref 3.4–10.8)
WHOLE BLOOD HOLD SPECIMEN: NORMAL
WHOLE BLOOD HOLD SPECIMEN: NORMAL

## 2021-03-25 PROCEDURE — 85730 THROMBOPLASTIN TIME PARTIAL: CPT | Performed by: FAMILY MEDICINE

## 2021-03-25 PROCEDURE — 93005 ELECTROCARDIOGRAM TRACING: CPT | Performed by: FAMILY MEDICINE

## 2021-03-25 PROCEDURE — 99284 EMERGENCY DEPT VISIT MOD MDM: CPT

## 2021-03-25 PROCEDURE — 25010000002 AMIODARONE IN DEXTROSE 5% 150-4.21 MG/100ML-% SOLUTION: Performed by: FAMILY MEDICINE

## 2021-03-25 PROCEDURE — 85610 PROTHROMBIN TIME: CPT | Performed by: FAMILY MEDICINE

## 2021-03-25 PROCEDURE — 83880 ASSAY OF NATRIURETIC PEPTIDE: CPT | Performed by: FAMILY MEDICINE

## 2021-03-25 PROCEDURE — 25010000002 AMIODARONE IN DEXTROSE 5% 360-4.14 MG/200ML-% SOLUTION: Performed by: NURSE PRACTITIONER

## 2021-03-25 PROCEDURE — 84484 ASSAY OF TROPONIN QUANT: CPT | Performed by: NURSE PRACTITIONER

## 2021-03-25 PROCEDURE — 85025 COMPLETE CBC W/AUTO DIFF WBC: CPT | Performed by: FAMILY MEDICINE

## 2021-03-25 PROCEDURE — 25010000002 AMIODARONE IN DEXTROSE 5% 360-4.14 MG/200ML-% SOLUTION: Performed by: FAMILY MEDICINE

## 2021-03-25 PROCEDURE — 80053 COMPREHEN METABOLIC PANEL: CPT | Performed by: FAMILY MEDICINE

## 2021-03-25 PROCEDURE — 71045 X-RAY EXAM CHEST 1 VIEW: CPT

## 2021-03-25 PROCEDURE — 82962 GLUCOSE BLOOD TEST: CPT

## 2021-03-25 PROCEDURE — 84484 ASSAY OF TROPONIN QUANT: CPT | Performed by: FAMILY MEDICINE

## 2021-03-25 PROCEDURE — 93010 ELECTROCARDIOGRAM REPORT: CPT | Performed by: INTERNAL MEDICINE

## 2021-03-25 PROCEDURE — 87635 SARS-COV-2 COVID-19 AMP PRB: CPT | Performed by: FAMILY MEDICINE

## 2021-03-25 PROCEDURE — 25010000002 FUROSEMIDE PER 20 MG: Performed by: NURSE PRACTITIONER

## 2021-03-25 RX ORDER — SODIUM CHLORIDE 0.9 % (FLUSH) 0.9 %
10 SYRINGE (ML) INJECTION AS NEEDED
Status: DISCONTINUED | OUTPATIENT
Start: 2021-03-25 | End: 2021-03-27 | Stop reason: HOSPADM

## 2021-03-25 RX ORDER — FUROSEMIDE 10 MG/ML
40 INJECTION INTRAMUSCULAR; INTRAVENOUS ONCE
Status: COMPLETED | OUTPATIENT
Start: 2021-03-25 | End: 2021-03-25

## 2021-03-25 RX ORDER — PANTOPRAZOLE SODIUM 40 MG/1
40 TABLET, DELAYED RELEASE ORAL
Status: DISCONTINUED | OUTPATIENT
Start: 2021-03-26 | End: 2021-03-27 | Stop reason: HOSPADM

## 2021-03-25 RX ORDER — AMIODARONE HYDROCHLORIDE 50 MG/ML
150 INJECTION, SOLUTION INTRAVENOUS ONCE
Status: DISCONTINUED | OUTPATIENT
Start: 2021-03-25 | End: 2021-03-25

## 2021-03-25 RX ORDER — NICOTINE POLACRILEX 4 MG
15 LOZENGE BUCCAL
Status: DISCONTINUED | OUTPATIENT
Start: 2021-03-25 | End: 2021-03-27 | Stop reason: HOSPADM

## 2021-03-25 RX ORDER — DILTIAZEM HYDROCHLORIDE 5 MG/ML
10 INJECTION INTRAVENOUS ONCE
Status: COMPLETED | OUTPATIENT
Start: 2021-03-25 | End: 2021-03-25

## 2021-03-25 RX ORDER — SODIUM CHLORIDE 0.9 % (FLUSH) 0.9 %
10 SYRINGE (ML) INJECTION EVERY 12 HOURS SCHEDULED
Status: DISCONTINUED | OUTPATIENT
Start: 2021-03-25 | End: 2021-03-27 | Stop reason: HOSPADM

## 2021-03-25 RX ORDER — ACETAMINOPHEN 325 MG/1
650 TABLET ORAL EVERY 4 HOURS PRN
Status: DISCONTINUED | OUTPATIENT
Start: 2021-03-25 | End: 2021-03-27 | Stop reason: HOSPADM

## 2021-03-25 RX ORDER — ACETAMINOPHEN 650 MG/1
650 SUPPOSITORY RECTAL EVERY 4 HOURS PRN
Status: DISCONTINUED | OUTPATIENT
Start: 2021-03-25 | End: 2021-03-27 | Stop reason: HOSPADM

## 2021-03-25 RX ORDER — VENLAFAXINE 50 MG/1
50 TABLET ORAL DAILY
Status: DISCONTINUED | OUTPATIENT
Start: 2021-03-25 | End: 2021-03-27 | Stop reason: HOSPADM

## 2021-03-25 RX ORDER — CELECOXIB 200 MG/1
200 CAPSULE ORAL DAILY
Status: DISCONTINUED | OUTPATIENT
Start: 2021-03-26 | End: 2021-03-27 | Stop reason: HOSPADM

## 2021-03-25 RX ORDER — ONDANSETRON 2 MG/ML
4 INJECTION INTRAMUSCULAR; INTRAVENOUS EVERY 6 HOURS PRN
Status: DISCONTINUED | OUTPATIENT
Start: 2021-03-25 | End: 2021-03-27 | Stop reason: HOSPADM

## 2021-03-25 RX ORDER — DEXTROSE MONOHYDRATE 25 G/50ML
25 INJECTION, SOLUTION INTRAVENOUS
Status: DISCONTINUED | OUTPATIENT
Start: 2021-03-25 | End: 2021-03-27 | Stop reason: HOSPADM

## 2021-03-25 RX ORDER — VENLAFAXINE 50 MG/1
50 TABLET ORAL DAILY
COMMUNITY
End: 2021-07-06

## 2021-03-25 RX ORDER — ACETAMINOPHEN 160 MG/5ML
650 SOLUTION ORAL EVERY 4 HOURS PRN
Status: DISCONTINUED | OUTPATIENT
Start: 2021-03-25 | End: 2021-03-27 | Stop reason: HOSPADM

## 2021-03-25 RX ORDER — LISINOPRIL 2.5 MG/1
2.5 TABLET ORAL DAILY
Status: DISCONTINUED | OUTPATIENT
Start: 2021-03-25 | End: 2021-03-27 | Stop reason: HOSPADM

## 2021-03-25 RX ORDER — ONDANSETRON 4 MG/1
4 TABLET, FILM COATED ORAL EVERY 6 HOURS PRN
Status: DISCONTINUED | OUTPATIENT
Start: 2021-03-25 | End: 2021-03-27 | Stop reason: HOSPADM

## 2021-03-25 RX ORDER — METOPROLOL SUCCINATE 25 MG/1
25 TABLET, EXTENDED RELEASE ORAL
Status: DISCONTINUED | OUTPATIENT
Start: 2021-03-25 | End: 2021-03-27 | Stop reason: HOSPADM

## 2021-03-25 RX ORDER — BUDESONIDE 0.5 MG/2ML
0.5 INHALANT ORAL
Status: DISCONTINUED | OUTPATIENT
Start: 2021-03-25 | End: 2021-03-27 | Stop reason: HOSPADM

## 2021-03-25 RX ADMIN — VENLAFAXINE 50 MG: 50 TABLET ORAL at 22:48

## 2021-03-25 RX ADMIN — RIVAROXABAN 20 MG: 20 TABLET, FILM COATED ORAL at 22:47

## 2021-03-25 RX ADMIN — DILTIAZEM HYDROCHLORIDE 10 MG: 5 INJECTION INTRAVENOUS at 14:24

## 2021-03-25 RX ADMIN — SODIUM CHLORIDE, PRESERVATIVE FREE 10 ML: 5 INJECTION INTRAVENOUS at 22:49

## 2021-03-25 RX ADMIN — AMIODARONE HYDROCHLORIDE 150 MG: 1.5 INJECTION, SOLUTION INTRAVENOUS at 17:06

## 2021-03-25 RX ADMIN — METOPROLOL SUCCINATE 25 MG: 25 TABLET, EXTENDED RELEASE ORAL at 22:48

## 2021-03-25 RX ADMIN — LISINOPRIL 2.5 MG: 2.5 TABLET ORAL at 22:48

## 2021-03-25 RX ADMIN — AMIODARONE HYDROCHLORIDE 0.5 MG/MIN: 1.8 INJECTION, SOLUTION INTRAVENOUS at 23:11

## 2021-03-25 RX ADMIN — FUROSEMIDE 40 MG: 10 INJECTION, SOLUTION INTRAMUSCULAR; INTRAVENOUS at 22:48

## 2021-03-25 RX ADMIN — AMIODARONE HYDROCHLORIDE 1 MG/MIN: 1.8 INJECTION, SOLUTION INTRAVENOUS at 17:08

## 2021-03-26 ENCOUNTER — APPOINTMENT (OUTPATIENT)
Dept: CARDIOLOGY | Facility: HOSPITAL | Age: 66
End: 2021-03-26

## 2021-03-26 LAB
ANION GAP SERPL CALCULATED.3IONS-SCNC: 10 MMOL/L (ref 5–15)
BH CV ECHO MEAS - AO MAX PG (FULL): 1.7 MMHG
BH CV ECHO MEAS - AO MAX PG: 3.6 MMHG
BH CV ECHO MEAS - AO MEAN PG (FULL): 2 MMHG
BH CV ECHO MEAS - AO MEAN PG: 3 MMHG
BH CV ECHO MEAS - AO ROOT AREA (BSA CORRECTED): 1.6
BH CV ECHO MEAS - AO ROOT AREA: 7.1 CM^2
BH CV ECHO MEAS - AO ROOT DIAM: 3 CM
BH CV ECHO MEAS - AO V2 MAX: 94.3 CM/SEC
BH CV ECHO MEAS - AO V2 MEAN: 81.3 CM/SEC
BH CV ECHO MEAS - AO V2 VTI: 16.6 CM
BH CV ECHO MEAS - AVA(I,A): 1.6 CM^2
BH CV ECHO MEAS - AVA(I,D): 1.6 CM^2
BH CV ECHO MEAS - AVA(V,A): 1.7 CM^2
BH CV ECHO MEAS - AVA(V,D): 1.7 CM^2
BH CV ECHO MEAS - BSA(HAYCOCK): 1.8 M^2
BH CV ECHO MEAS - BSA: 1.8 M^2
BH CV ECHO MEAS - BZI_BMI: 24.7 KILOGRAMS/M^2
BH CV ECHO MEAS - BZI_METRIC_HEIGHT: 170.2 CM
BH CV ECHO MEAS - BZI_METRIC_WEIGHT: 71.7 KG
BH CV ECHO MEAS - EDV(CUBED): 111.3 ML
BH CV ECHO MEAS - EDV(MOD-SP4): 128 ML
BH CV ECHO MEAS - EDV(TEICH): 108 ML
BH CV ECHO MEAS - EF(CUBED): 46.7 %
BH CV ECHO MEAS - EF(MOD-SP4): 33.1 %
BH CV ECHO MEAS - EF(TEICH): 39 %
BH CV ECHO MEAS - ESV(CUBED): 59.3 ML
BH CV ECHO MEAS - ESV(MOD-SP4): 85.6 ML
BH CV ECHO MEAS - ESV(TEICH): 65.9 ML
BH CV ECHO MEAS - FS: 18.9 %
BH CV ECHO MEAS - IVS/LVPW: 1.1
BH CV ECHO MEAS - IVSD: 0.84 CM
BH CV ECHO MEAS - LA DIMENSION: 4.1 CM
BH CV ECHO MEAS - LA/AO: 1.4
BH CV ECHO MEAS - LAT PEAK E' VEL: 4.4 CM/SEC
BH CV ECHO MEAS - LV DIASTOLIC VOL/BSA (35-75): 70 ML/M^2
BH CV ECHO MEAS - LV MASS(C)D: 124.8 GRAMS
BH CV ECHO MEAS - LV MASS(C)DI: 68.2 GRAMS/M^2
BH CV ECHO MEAS - LV MAX PG: 1.9 MMHG
BH CV ECHO MEAS - LV MEAN PG: 1 MMHG
BH CV ECHO MEAS - LV SYSTOLIC VOL/BSA (12-30): 46.8 ML/M^2
BH CV ECHO MEAS - LV V1 MAX: 68.7 CM/SEC
BH CV ECHO MEAS - LV V1 MEAN: 52.2 CM/SEC
BH CV ECHO MEAS - LV V1 VTI: 11.4 CM
BH CV ECHO MEAS - LVIDD: 4.8 CM
BH CV ECHO MEAS - LVIDS: 3.9 CM
BH CV ECHO MEAS - LVLD AP4: 7.8 CM
BH CV ECHO MEAS - LVLS AP4: 7.2 CM
BH CV ECHO MEAS - LVOT AREA (M): 2.3 CM^2
BH CV ECHO MEAS - LVOT AREA: 2.3 CM^2
BH CV ECHO MEAS - LVOT DIAM: 1.7 CM
BH CV ECHO MEAS - LVPWD: 0.74 CM
BH CV ECHO MEAS - MED PEAK E' VEL: 4.13 CM/SEC
BH CV ECHO MEAS - MR MAX PG: 99.2 MMHG
BH CV ECHO MEAS - MR MAX VEL: 498 CM/SEC
BH CV ECHO MEAS - MR MEAN PG: 66 MMHG
BH CV ECHO MEAS - MR MEAN VEL: 384 CM/SEC
BH CV ECHO MEAS - MR VTI: 162 CM
BH CV ECHO MEAS - MV AREA (1 DIAM): 7.5 CM^2
BH CV ECHO MEAS - MV DEC SLOPE: 653 CM/SEC^2
BH CV ECHO MEAS - MV DEC TIME: 0.12 SEC
BH CV ECHO MEAS - MV DIAM: 3.1 CM
BH CV ECHO MEAS - MV E MAX VEL: 80.5 CM/SEC
BH CV ECHO MEAS - MV FLOW AREA(1DIAM): 7.5 CM^2
BH CV ECHO MEAS - MV MAX PG: 1 MMHG
BH CV ECHO MEAS - MV MEAN PG: 0 MMHG
BH CV ECHO MEAS - MV V2 MAX: 50.2 CM/SEC
BH CV ECHO MEAS - MV V2 MEAN: 28.9 CM/SEC
BH CV ECHO MEAS - MV V2 VTI: 10.4 CM
BH CV ECHO MEAS - MVA(VTI): 2.5 CM^2
BH CV ECHO MEAS - RAP SYSTOLE: 5 MMHG
BH CV ECHO MEAS - RF(MV,AO)(1 DIAM): -0.49
BH CV ECHO MEAS - RF(MV,LVOT)(1DIAM): 0.67
BH CV ECHO MEAS - RVSP: 28.2 MMHG
BH CV ECHO MEAS - SI(AO): 64.1 ML/M^2
BH CV ECHO MEAS - SI(CUBED): 28.4 ML/M^2
BH CV ECHO MEAS - SI(LVOT): 14.1 ML/M^2
BH CV ECHO MEAS - SI(MOD-SP4): 23.2 ML/M^2
BH CV ECHO MEAS - SI(MV 1 DIAM): 42.9 ML/M^2
BH CV ECHO MEAS - SI(TEICH): 23 ML/M^2
BH CV ECHO MEAS - SV(AO): 117.3 ML
BH CV ECHO MEAS - SV(CUBED): 52 ML
BH CV ECHO MEAS - SV(LVOT): 25.9 ML
BH CV ECHO MEAS - SV(MOD-SP4): 42.4 ML
BH CV ECHO MEAS - SV(MV 1 DIAM): 78.5 ML
BH CV ECHO MEAS - SV(TEICH): 42.1 ML
BH CV ECHO MEAS - TR MAX VEL: 241 CM/SEC
BH CV ECHO MEASUREMENTS AVERAGE E/E' RATIO: 18.87
BUN SERPL-MCNC: 15 MG/DL (ref 8–23)
BUN/CREAT SERPL: 13.6 (ref 7–25)
CALCIUM SPEC-SCNC: 9.5 MG/DL (ref 8.6–10.5)
CHLORIDE SERPL-SCNC: 100 MMOL/L (ref 98–107)
CHOLEST SERPL-MCNC: 148 MG/DL (ref 0–200)
CO2 SERPL-SCNC: 31 MMOL/L (ref 22–29)
CREAT SERPL-MCNC: 1.1 MG/DL (ref 0.76–1.27)
GFR SERPL CREATININE-BSD FRML MDRD: 67 ML/MIN/1.73
GLUCOSE BLDC GLUCOMTR-MCNC: 108 MG/DL (ref 70–130)
GLUCOSE BLDC GLUCOMTR-MCNC: 128 MG/DL (ref 70–130)
GLUCOSE BLDC GLUCOMTR-MCNC: 144 MG/DL (ref 70–130)
GLUCOSE BLDC GLUCOMTR-MCNC: 157 MG/DL (ref 70–130)
GLUCOSE SERPL-MCNC: 122 MG/DL (ref 65–99)
HBA1C MFR BLD: 6.4 % (ref 4.8–5.6)
HDLC SERPL-MCNC: 59 MG/DL (ref 40–60)
LDLC SERPL CALC-MCNC: 75 MG/DL (ref 0–100)
LDLC/HDLC SERPL: 1.27 {RATIO}
LEFT ATRIUM VOLUME INDEX: 40.5 ML/M2
LEFT ATRIUM VOLUME: 74.1 CM3
MAXIMAL PREDICTED HEART RATE: 154 BPM
NT-PROBNP SERPL-MCNC: 3504 PG/ML (ref 0–900)
POTASSIUM SERPL-SCNC: 4 MMOL/L (ref 3.5–5.2)
QT INTERVAL: 274 MS
QTC INTERVAL: 415 MS
SODIUM SERPL-SCNC: 141 MMOL/L (ref 136–145)
STRESS TARGET HR: 131 BPM
TRIGL SERPL-MCNC: 70 MG/DL (ref 0–150)
TSH SERPL DL<=0.05 MIU/L-ACNC: 0.86 UIU/ML (ref 0.27–4.2)
VLDLC SERPL-MCNC: 14 MG/DL (ref 5–40)

## 2021-03-26 PROCEDURE — 93306 TTE W/DOPPLER COMPLETE: CPT

## 2021-03-26 PROCEDURE — 94799 UNLISTED PULMONARY SVC/PX: CPT

## 2021-03-26 PROCEDURE — 25010000002 AMIODARONE IN DEXTROSE 5% 360-4.14 MG/200ML-% SOLUTION: Performed by: PHYSICIAN ASSISTANT

## 2021-03-26 PROCEDURE — 83036 HEMOGLOBIN GLYCOSYLATED A1C: CPT | Performed by: NURSE PRACTITIONER

## 2021-03-26 PROCEDURE — 25010000002 PERFLUTREN 6.52 MG/ML SUSPENSION: Performed by: FAMILY MEDICINE

## 2021-03-26 PROCEDURE — 93005 ELECTROCARDIOGRAM TRACING: CPT | Performed by: FAMILY MEDICINE

## 2021-03-26 PROCEDURE — 80061 LIPID PANEL: CPT | Performed by: NURSE PRACTITIONER

## 2021-03-26 PROCEDURE — 84443 ASSAY THYROID STIM HORMONE: CPT | Performed by: PHYSICIAN ASSISTANT

## 2021-03-26 PROCEDURE — 94640 AIRWAY INHALATION TREATMENT: CPT

## 2021-03-26 PROCEDURE — 93306 TTE W/DOPPLER COMPLETE: CPT | Performed by: EMERGENCY MEDICINE

## 2021-03-26 PROCEDURE — 83880 ASSAY OF NATRIURETIC PEPTIDE: CPT | Performed by: NURSE PRACTITIONER

## 2021-03-26 PROCEDURE — 93010 ELECTROCARDIOGRAM REPORT: CPT | Performed by: INTERNAL MEDICINE

## 2021-03-26 PROCEDURE — 80048 BASIC METABOLIC PNL TOTAL CA: CPT | Performed by: NURSE PRACTITIONER

## 2021-03-26 PROCEDURE — 99222 1ST HOSP IP/OBS MODERATE 55: CPT | Performed by: PHYSICIAN ASSISTANT

## 2021-03-26 PROCEDURE — 82962 GLUCOSE BLOOD TEST: CPT

## 2021-03-26 RX ORDER — AMIODARONE HYDROCHLORIDE 200 MG/1
200 TABLET ORAL EVERY 12 HOURS SCHEDULED
Status: DISCONTINUED | OUTPATIENT
Start: 2021-03-27 | End: 2021-03-26

## 2021-03-26 RX ORDER — AMIODARONE HYDROCHLORIDE 200 MG/1
200 TABLET ORAL
Status: DISCONTINUED | OUTPATIENT
Start: 2021-04-03 | End: 2021-03-27 | Stop reason: HOSPADM

## 2021-03-26 RX ORDER — LORATADINE 10 MG/1
10 TABLET ORAL AS NEEDED
COMMUNITY

## 2021-03-26 RX ORDER — AMIODARONE HYDROCHLORIDE 200 MG/1
200 TABLET ORAL EVERY 12 HOURS SCHEDULED
Status: DISCONTINUED | OUTPATIENT
Start: 2021-03-26 | End: 2021-03-27 | Stop reason: HOSPADM

## 2021-03-26 RX ADMIN — SODIUM CHLORIDE, PRESERVATIVE FREE 10 ML: 5 INJECTION INTRAVENOUS at 21:32

## 2021-03-26 RX ADMIN — BUDESONIDE 0.5 MG: 0.5 INHALANT RESPIRATORY (INHALATION) at 20:41

## 2021-03-26 RX ADMIN — METOPROLOL SUCCINATE 25 MG: 25 TABLET, EXTENDED RELEASE ORAL at 08:16

## 2021-03-26 RX ADMIN — RIVAROXABAN 20 MG: 20 TABLET, FILM COATED ORAL at 17:55

## 2021-03-26 RX ADMIN — VENLAFAXINE 50 MG: 50 TABLET ORAL at 08:16

## 2021-03-26 RX ADMIN — AMIODARONE HYDROCHLORIDE 200 MG: 200 TABLET ORAL at 21:32

## 2021-03-26 RX ADMIN — AMIODARONE HYDROCHLORIDE 0.5 MG/MIN: 1.8 INJECTION, SOLUTION INTRAVENOUS at 11:39

## 2021-03-26 RX ADMIN — PERFLUTREN 8.48 MG: 6.52 INJECTION, SUSPENSION INTRAVENOUS at 16:12

## 2021-03-26 RX ADMIN — BUDESONIDE 0.5 MG: 0.5 INHALANT RESPIRATORY (INHALATION) at 07:24

## 2021-03-26 RX ADMIN — LISINOPRIL 2.5 MG: 2.5 TABLET ORAL at 08:16

## 2021-03-26 RX ADMIN — PANTOPRAZOLE SODIUM 40 MG: 40 TABLET, DELAYED RELEASE ORAL at 05:47

## 2021-03-26 RX ADMIN — CELECOXIB 200 MG: 200 CAPSULE ORAL at 08:16

## 2021-03-27 VITALS
SYSTOLIC BLOOD PRESSURE: 116 MMHG | BODY MASS INDEX: 24.23 KG/M2 | OXYGEN SATURATION: 97 % | WEIGHT: 154.4 LBS | RESPIRATION RATE: 14 BRPM | HEART RATE: 103 BPM | TEMPERATURE: 98.3 F | HEIGHT: 67 IN | DIASTOLIC BLOOD PRESSURE: 86 MMHG

## 2021-03-27 LAB
GLUCOSE BLDC GLUCOMTR-MCNC: 112 MG/DL (ref 70–130)
GLUCOSE BLDC GLUCOMTR-MCNC: 138 MG/DL (ref 70–130)

## 2021-03-27 PROCEDURE — 94799 UNLISTED PULMONARY SVC/PX: CPT

## 2021-03-27 PROCEDURE — 93005 ELECTROCARDIOGRAM TRACING: CPT | Performed by: FAMILY MEDICINE

## 2021-03-27 PROCEDURE — 93010 ELECTROCARDIOGRAM REPORT: CPT | Performed by: INTERNAL MEDICINE

## 2021-03-27 PROCEDURE — 82962 GLUCOSE BLOOD TEST: CPT

## 2021-03-27 RX ORDER — ACETAMINOPHEN 325 MG/1
650 TABLET ORAL EVERY 4 HOURS PRN
Qty: 30 TABLET | Refills: 0 | Status: SHIPPED | OUTPATIENT
Start: 2021-03-27

## 2021-03-27 RX ORDER — AMIODARONE HYDROCHLORIDE 200 MG/1
200 TABLET ORAL EVERY 12 HOURS SCHEDULED
Qty: 12 TABLET | Refills: 0 | Status: SHIPPED | OUTPATIENT
Start: 2021-03-27 | End: 2021-04-02

## 2021-03-27 RX ORDER — LISINOPRIL 2.5 MG/1
2.5 TABLET ORAL DAILY
Qty: 90 TABLET | Refills: 3 | Status: SHIPPED | OUTPATIENT
Start: 2021-03-27 | End: 2021-04-16

## 2021-03-27 RX ORDER — AMIODARONE HYDROCHLORIDE 200 MG/1
200 TABLET ORAL
Qty: 30 TABLET | Refills: 1 | Status: SHIPPED | OUTPATIENT
Start: 2021-04-03 | End: 2021-06-07

## 2021-03-27 RX ADMIN — PANTOPRAZOLE SODIUM 40 MG: 40 TABLET, DELAYED RELEASE ORAL at 06:24

## 2021-03-27 RX ADMIN — METOPROLOL SUCCINATE 25 MG: 25 TABLET, EXTENDED RELEASE ORAL at 08:26

## 2021-03-27 RX ADMIN — LISINOPRIL 2.5 MG: 2.5 TABLET ORAL at 08:26

## 2021-03-27 RX ADMIN — BUDESONIDE 0.5 MG: 0.5 INHALANT RESPIRATORY (INHALATION) at 07:34

## 2021-03-27 RX ADMIN — SODIUM CHLORIDE, PRESERVATIVE FREE 10 ML: 5 INJECTION INTRAVENOUS at 08:26

## 2021-03-27 RX ADMIN — VENLAFAXINE 50 MG: 50 TABLET ORAL at 08:26

## 2021-03-27 RX ADMIN — CELECOXIB 200 MG: 200 CAPSULE ORAL at 08:26

## 2021-03-27 RX ADMIN — AMIODARONE HYDROCHLORIDE 200 MG: 200 TABLET ORAL at 08:25

## 2021-03-28 ENCOUNTER — READMISSION MANAGEMENT (OUTPATIENT)
Dept: CALL CENTER | Facility: HOSPITAL | Age: 66
End: 2021-03-28

## 2021-03-28 NOTE — OUTREACH NOTE
Prep Survey      Responses   [facility] patient discharged from?  [place]   Is LACE score < 7 ?  No   Emergency Room discharge w/ pulse ox?  No   Eligibility  Readm Mgmt   Discharge diagnosis  AFIB/RVR   Does the patient have one of the following disease processes/diagnoses(primary or secondary)?  Other   Does the patient have Home health ordered?  No   Is there a DME ordered?  No   Comments regarding appointments  to be notified   General alerts for this patient  HX CHF   Prep survey completed?  Yes          Ursula Concepcion RN

## 2021-03-29 LAB
QT INTERVAL: 350 MS
QT INTERVAL: 358 MS
QTC INTERVAL: 459 MS
QTC INTERVAL: 469 MS

## 2021-03-31 ENCOUNTER — READMISSION MANAGEMENT (OUTPATIENT)
Dept: CALL CENTER | Facility: HOSPITAL | Age: 66
End: 2021-03-31

## 2021-03-31 NOTE — OUTREACH NOTE
Medical Week 1 Survey      Responses   [facility] patient discharged from?  [place]   Does the patient have one of the following disease processes/diagnoses(primary or secondary)?  Other   Week 1 attempt successful?  Yes   Call start time  1232   Call end time  1235   General alerts for this patient  HX CHF   Discharge diagnosis  AFIB/RVR   Meds reviewed with patient/caregiver?  Yes   Is the patient having any side effects they believe may be caused by any medication additions or changes?  No   Does the patient have all medications ordered at discharge?  Yes   Is the patient taking all medications as directed (includes completed medication regime)?  Yes   Does the patient have a primary care provider?   Yes   Does the patient have an appointment with their PCP within 7 days of discharge?  Yes   Has the patient kept scheduled appointments due by today?  Yes   Comments  Root canal today, pulse 61 and felt good.   Has home health visited the patient within 72 hours of discharge?  N/A   Has all DME been delivered?  No   Psychosocial issues?  No   Did the patient receive a copy of their discharge instructions?  Yes   Nursing interventions  Reviewed instructions with patient   What is the patient's perception of their health status since discharge?  Improving   Is the patient/caregiver able to teach back signs and symptoms related to disease process for when to call PCP?  Yes   Is the patient/caregiver able to teach back signs and symptoms related to disease process for when to call 911?  Yes   Is the patient/caregiver able to teach back the hierarchy of who to call/visit for symptoms/problems? PCP, Specialist, Home health nurse, Urgent Care, ED, 911  Yes   If the patient is a current smoker, are they able to teach back resources for cessation?  Not a smoker   Additional teach back comments  PCP next week and cards week after.   Week 1 call completed?  Yes   Wrap up additional comments  Improving.          Katie  CELIO Gallagher RN

## 2021-04-07 ENCOUNTER — READMISSION MANAGEMENT (OUTPATIENT)
Dept: CALL CENTER | Facility: HOSPITAL | Age: 66
End: 2021-04-07

## 2021-04-07 NOTE — OUTREACH NOTE
Medical Week 2 Survey      Responses   [facility] patient discharged from?  [place]   Does the patient have one of the following disease processes/diagnoses(primary or secondary)?  Other   Week 2 attempt successful?  Yes   Call start time  1539   General alerts for this patient  HX CHF   Discharge diagnosis  AFIB/RVR   Call end time  1544   Meds reviewed with patient/caregiver?  Yes   Is the patient having any side effects they believe may be caused by any medication additions or changes?  No   Does the patient have all medications ordered at discharge?  Yes   Is the patient taking all medications as directed (includes completed medication regime)?  Yes   Does the patient have a primary care provider?   Yes   Does the patient have an appointment with their PCP within 7 days of discharge?  Yes   Comments regarding PCP  PCP yesterday   Has the patient kept scheduled appointments due by today?  Yes   Has all DME been delivered?  No   Psychosocial issues?  No   Comments  /60 Pulse 91 today.   Did the patient receive a copy of their discharge instructions?  Yes   Nursing interventions  Reviewed instructions with patient   What is the patient's perception of their health status since discharge?  Improving   Is the patient/caregiver able to teach back signs and symptoms related to disease process for when to call PCP?  Yes   Is the patient/caregiver able to teach back signs and symptoms related to disease process for when to call 911?  Yes   Is the patient/caregiver able to teach back the hierarchy of who to call/visit for symptoms/problems? PCP, Specialist, Home health nurse, Urgent Care, ED, 911  Yes   Additional teach back comments  Pt HR today is 91.  He states yesterday it was 141 at rest.  He did see provider yesterday and updated of this.  No changes made at that time.    Week 2 Call Completed?  Yes   Wrap up additional comments  Patient reports he has been fatigued and is resting currently.  He is able  to do ADL's and did get out today for lunch.  He denies irregular rhythm today.  He is documenting pulse and BP.  He was encouraged to call cardiology if HR stays consistently elevated.  He verbalized understanding.           Nichol Hallman RN

## 2021-04-16 ENCOUNTER — OFFICE VISIT (OUTPATIENT)
Dept: CARDIOLOGY | Facility: CLINIC | Age: 66
End: 2021-04-16

## 2021-04-16 VITALS
SYSTOLIC BLOOD PRESSURE: 118 MMHG | WEIGHT: 158 LBS | RESPIRATION RATE: 18 BRPM | HEIGHT: 67 IN | OXYGEN SATURATION: 99 % | BODY MASS INDEX: 24.8 KG/M2 | HEART RATE: 99 BPM | DIASTOLIC BLOOD PRESSURE: 70 MMHG

## 2021-04-16 DIAGNOSIS — I42.0 DILATED CARDIOMYOPATHY (HCC): ICD-10-CM

## 2021-04-16 DIAGNOSIS — F10.21 HISTORY OF ALCOHOL DEPENDENCE (HCC): ICD-10-CM

## 2021-04-16 DIAGNOSIS — I50.42 CHRONIC COMBINED SYSTOLIC AND DIASTOLIC CONGESTIVE HEART FAILURE (HCC): ICD-10-CM

## 2021-04-16 DIAGNOSIS — Z79.01 CHRONIC ANTICOAGULATION: Chronic | ICD-10-CM

## 2021-04-16 DIAGNOSIS — I51.3 THROMBUS OF LEFT ATRIAL APPENDAGE: ICD-10-CM

## 2021-04-16 DIAGNOSIS — I48.19 PERSISTENT ATRIAL FIBRILLATION (HCC): Primary | ICD-10-CM

## 2021-04-16 DIAGNOSIS — I10 HTN (HYPERTENSION), BENIGN: Chronic | ICD-10-CM

## 2021-04-16 DIAGNOSIS — E11.9 CONTROLLED TYPE 2 DIABETES MELLITUS WITHOUT COMPLICATION, WITHOUT LONG-TERM CURRENT USE OF INSULIN (HCC): Chronic | ICD-10-CM

## 2021-04-16 PROBLEM — K20.90 ESOPHAGITIS: Status: RESOLVED | Noted: 2017-10-06 | Resolved: 2021-04-16

## 2021-04-16 PROBLEM — I48.91 ATRIAL FIBRILLATION WITH RVR (HCC): Chronic | Status: ACTIVE | Noted: 2021-03-25

## 2021-04-16 PROBLEM — K21.9 GASTROESOPHAGEAL REFLUX DISEASE: Status: RESOLVED | Noted: 2017-09-05 | Resolved: 2021-04-16

## 2021-04-16 PROCEDURE — 99214 OFFICE O/P EST MOD 30 MIN: CPT | Performed by: NURSE PRACTITIONER

## 2021-04-16 PROCEDURE — 93000 ELECTROCARDIOGRAM COMPLETE: CPT | Performed by: NURSE PRACTITIONER

## 2021-04-16 RX ORDER — METOPROLOL SUCCINATE 50 MG/1
50 TABLET, EXTENDED RELEASE ORAL
Qty: 30 TABLET | Refills: 11 | Status: SHIPPED | OUTPATIENT
Start: 2021-04-16 | End: 2021-11-10 | Stop reason: SDUPTHER

## 2021-04-16 NOTE — PROGRESS NOTES
"    Subjective:     Encounter Date:04/16/2021      Patient ID: Ryan Sanchez is a 65 y.o. male with a previous history of paroxysmal atrial fibrillation, long term anticoagulation, chronic systolic congestive heart failure, cardiomyopathy, diabetes mellitus, and previous EtOH abuse who presents today for follow up after recent hospitalization.     Chief Complaint: Discharge follow up  History of Present Illness    Mr. Sanchez was first seen by our service in consultation in December 2020 for new findings of atrial fibrillation with RVR.  He had presented to the hospital with complaints of shortness of breath and found to be in atrial fibrillation, chronicity unknown at that time.  He had reported at least a month of symptoms including shortness of breath, sharp left-sided chest pain.  During that hospital evaluation the patient had echocardiogram and was found to have a reduced left ventricular systolic function.  His EF was estimated to be 40 to 45% at that time.  Mild mitral regurgitation was noted.  During that hospitalization the patient was started on oral anticoagulation, ACE inhibitor, loop diuretic, beta-blocker for management of his systolic heart failure, and medication adjustments for improvement of his heart rates.  During his hospitalization a CT angiogram was ordered of his chest for evaluation of his complaints of shortness of breath.  This revealed a left atrial appendage thrombus.  It was recommended that the patient follow-up after discharge with cardiology however does not appear that an appointment was made when he was discharged from the hospital.      Subsequently, the patient presented back to the hospital in March with complaints of shortness of breath and palpitations.  He was evaluated in the emergency department and admitted for further evaluation and care.  The ER note mentions that the patient had been \"converted\" however it appears he was discharged in December in atrial fibrillation " without follow-up in between.  It is likely the patient has been in persistent A. fib since that time.  During the hospitalization in March cardiology was consulted due to atrial fibrillation.  The patient had reported his heart pounding and heart palpitations.  He reported easy fatigability and shortness of breath.  He denied chest pain at that time as well.  He had previously been a heavy alcohol drinker and had had cessation of alcohol use but endorsed drinking a significant amount of coffee each day.  When he presented he was started on IV amiodarone due to rapid ventricular response on presentation.  He was converted to oral amiodarone at discharge.  His diltiazem was discontinued.  His Xarelto was continued for anticoagulation use and he was continued on his lisinopril and Toprol-XL at discharge.  He was using furosemide as needed.    He presents to the office for follow-up today.  He remains in atrial fibrillation on EKG his heart rate is 99 today.  He reports fatigue.  His shortness of breath is improved.  He does have episodes of shortness of breath generally related to elevated heart rates.  He tells me he had improvement of his heart rates and shortness of breath after taking an extra dose of his metoprolol at home on a couple occasions since he has been discharged.  He denies any chest pain, chest pressure.  He reports compliance with his medications.  He is on anticoagulation with Xarelto but has not been taking this with his largest meal but rather has been taking prior to breakfast.  He denies any bleeding issues with the medication.  His blood pressure is stable.  A repeat echocardiogram was obtained during his hospitalization in March.  His left ventricular systolic function has reduced since December.  His EF is estimated to be 30 to 35% at this time.  It is conceivable that this is due to poor rate control with atrial fibrillation.  He has moderate mitral regurgitation noted on his most recent  echo that had previously been noted to be mild.  This is likely secondary due to his worsening LVEF.  He was mildly volume overloaded during his most recent hospitalization and received some IV diuretics during that time as well.    He tells me that he monitors his heart rate at home and reports his heart rate to be anywhere from  on average.  He reports that his weights have been stable and he has not taken any Lasix since being discharged.  He has been compliant with his other medications.       The following portions of the patient's history were reviewed and updated as appropriate: allergies, current medications, past family history, past medical history, past social history and past surgical history.     No Known Allergies      Current Outpatient Medications:   •  acetaminophen (TYLENOL) 325 MG tablet, Take 2 tablets by mouth Every 4 (Four) Hours As Needed for Mild Pain ., Disp: 30 tablet, Rfl: 0  •  amiodarone (PACERONE) 200 MG tablet, Take 1 tablet by mouth Daily., Disp: 30 tablet, Rfl: 1  •  furosemide (Lasix) 40 MG tablet, Take 1 tablet by mouth Daily As Needed (Weight gain, shortness of breath, swelling)., Disp: 30 tablet, Rfl: 3  •  loratadine (CLARITIN) 10 MG tablet, Take 10 mg by mouth As Needed., Disp: , Rfl:   •  metFORMIN (GLUCOPHAGE) 500 MG tablet, Take 500 mg by mouth Daily. Pt takes one tablet daily, Disp: , Rfl:   •  metoprolol succinate XL (TOPROL-XL) 50 MG 24 hr tablet, Take 1 tablet by mouth Daily., Disp: 30 tablet, Rfl: 11  •  pantoprazole (PROTONIX) 40 MG EC tablet, Take 1 tablet by mouth Daily. (Patient taking differently: Take 40 mg by mouth 2 (Two) Times a Week.), Disp: 30 tablet, Rfl: 3  •  rivaroxaban (XARELTO) 20 MG tablet, Take 20 mg by mouth Daily., Disp: , Rfl:   •  venlafaxine (EFFEXOR) 50 MG tablet, Take 50 mg by mouth Daily., Disp: , Rfl:   •  sacubitril-valsartan (ENTRESTO) 24-26 MG tablet, Take 1 tablet by mouth 2 (Two) Times a Day., Disp: 60 tablet, Rfl: 11      Review  "of Systems   Constitutional: Positive for malaise/fatigue. Negative for weight gain.   Cardiovascular: Positive for palpitations. Negative for chest pain, dyspnea on exertion, irregular heartbeat, leg swelling, near-syncope and orthopnea.   Respiratory: Positive for shortness of breath. Negative for wheezing.    Hematologic/Lymphatic: Negative for bleeding problem. Bruises/bleeds easily.   Gastrointestinal: Negative for nausea and vomiting.   Neurological: Negative for dizziness, headaches and light-headedness.   Psychiatric/Behavioral: Negative for altered mental status.         ECG 12 Lead    Date/Time: 4/16/2021 1:14 PM  Performed by: Kortney Valentino APRN  Authorized by: Kortney Valentino APRN   Comparison: compared with previous ECG from 3/27/2021  Similar to previous ECG  Rhythm: atrial fibrillation  Rate: normal  BPM: 99  QRS axis: left    Clinical impression: abnormal EKG             /70 (BP Location: Right arm, Patient Position: Sitting, Cuff Size: Adult)   Pulse 99   Resp 18   Ht 170.2 cm (67\")   Wt 71.7 kg (158 lb)   SpO2 99%   BMI 24.75 kg/m²        Objective:     Vitals reviewed.   Constitutional:       Appearance: Well-developed, well-groomed and not in distress. Chronically ill-appearing.   HENT:      Head: Normocephalic and atraumatic.   Pulmonary:      Effort: Pulmonary effort is normal.      Breath sounds: Normal breath sounds.   Cardiovascular:      Normal rate. Irregularly irregular rhythm.      Murmurs: There is no murmur.      No gallop. No rub.   Edema:     Peripheral edema absent.   Abdominal:      General: Bowel sounds are normal.   Musculoskeletal:      Cervical back: Normal range of motion and neck supple. Skin:     General: Skin is warm and dry.   Neurological:      Mental Status: Alert, oriented to person, place, and time and oriented to person, place and time.   Psychiatric:         Mood and Affect: Mood and affect normal.         Behavior: Behavior is cooperative.       Lab " Review:    Results for orders placed during the hospital encounter of 03/25/21    Adult Transthoracic Echo Complete With Contrast if Necessary Per Protocol    Interpretation Summary  · Left ventricular ejection fraction appears to be 31 - 35%. Left ventricular systolic function is moderately decreased.  · Left ventricular diastolic function is consistent with (grade II w/high LAP) pseudonormalization.  · The left atrial cavity is mildly dilated.  · Mitral annular calcification is present. Moderate mitral valve regurgitation is present. No significant mitral valve stenosis is present.  · No evidence of pulmonary hypertension is present.    CT angiogram chest: 12/26/2020-impression relates left atrial appendage thrombus.      Assessment:          Diagnosis Plan   1. Persistent atrial fibrillation (CMS/HCC)     2. Chronic combined systolic and diastolic congestive heart failure (CMS/HCC)  Basic Metabolic Panel   3. Dilated cardiomyopathy (CMS/HCC)     4. Thrombus of left atrial appendage     5. Chronic anticoagulation     6. HTN (hypertension), benign     7. Controlled type 2 diabetes mellitus without complication, without long-term current use of insulin (CMS/HCC)     8. History of alcohol dependence (CMS/HCC)            Plan:       -Persistent atrial fibrillation: The patient presents to the office today for follow-up.  He remains in atrial fibrillation rates are 99.  He reports rates at home to be approximately 90-1 20 on checks.  He is more symptomatic with elevated hearts which supports his presentation to the emergency department in December as well as March.  I would favor that the patient has remained in atrial fibrillation since his presentation in December and chronicity at that time was unknown.  He has had medication adjustments for rate control and rhythm control at his recent hospitalization.  Currently the patient is on amiodarone 200 mg daily.  He was loaded with IV amiodarone during his  hospitalization.  He is on low-dose of beta-blocker therapy as well.  His previous diltiazem was discontinued.  He is anticoagulated with Xarelto however has not been taking this with his largest meal which we have discussed today.  He will start taking this meal in the evenings with dinner.  He reports improvement of his symptoms since discharge as he had been short of breath and fatigue.  Now he only relates to fatigue unless his heart rates are more elevated.  He has a worsening left ventricular ejection fraction since his previous presentation in December 2020.  This is likely due to uncontrolled heart rates.  We will increase his beta-blocker therapy from 25 mg daily to 50 mg daily.    -Chronic combined systolic and diastolic congestive heart failure: The patient has had evidence of heart failure with shortness of breath elevated BNP on both of his hospitalizations.  He was started on guideline directed medical therapy for systolic heart failure at his earliest presentation with use of low-dose lisinopril and Toprol-XL.  His heart failure exacerbations are likely related to his persistent arrhythmia and likely uncontrolled heart rates at home.  Since his most recent discharge she has had Lasix at home to use if needed for shortness of breath with increased weight gain.  He reports stable weights without use of Lasix.  His LVEF has continued to decline from December to March.  Initially when he presented his EF was 40 to 45% and since that time has dropped to 30 to 35%.  We will optimize his home medications.  [place] lisinopril 2.5 mg, his last dose was this morning.  Start low-dose Entresto 24/26 mg twice daily on Saturday evening.  Increase beta-blocker therapy as previously discussed.  Continue use of Lasix as needed.  BMP approximately 1 week after initiation of Entresto.    -Dilated cardiomyopathy: He has a mildly dilated left ventricle noted on echocardiogram.  He has a reduced LVEF most recent estimation  is 30-35 percent.  He is on guideline directed medical therapy.  We will optimize his heart failure medications as well as his rate control as this may be contributory to his decline in systolic function.    -Left atrial appendage thrombus: This was noted on CT angiogram of his chest in December.  During that hospitalization he was also found to have new diagnosis of atrial fibrillation.  He was started on anticoagulation at that time.      -Chronic anticoagulation: Initiated in December 2020.  He has continued anticoagulation and is on Xarelto 20 mg daily.  We have discussed use of this medication with his largest meal every day.  He will adjust his dose timing to correlate with his evening meal.  Continue anticoagulation due to the history of atrial fibrillation for stroke risk reduction.    -Hypertension: Well-controlled on current medications.  Monitor blood pressure with adjustments of medications today including increasing his beta-blocker and changing him from ACE inhibitor to ARNI.    -Diabetes mellitus: The patient is on Metformin for management of his diabetes and follows with his primary care physician's office.    -EtOH abuse: The patient has a history of EtOH abuse.  The patient is currently abstaining from alcohol use.    Medication adjustments as noted above.  Labs to follow.  Follow-up in 2 weeks.  We will discuss continued management of congestive heart failure and likely outpatient cardioversion.  I will discuss with Dr. Coto as I assume the patient may require CHANTEL prior to cardioversion.    Of note, the patient was started on amiodarone therapy and baseline PFTs were recommended.  These will need to be ordered and obtained as an outpatient.  His TSH was obtained during his hospitalization which was found to be normal.  CMP was normal.

## 2021-04-16 NOTE — PATIENT INSTRUCTIONS
Bleeding Precautions When on Anticoagulant Therapy, Adult  Anticoagulant therapy, also called blood thinner therapy, is medicine that helps to prevent and treat blood clots. The medicine works by stopping blood clots from forming or growing. Blood clots that form in your blood vessels can be dangerous. They can break loose and travel to the heart, lungs, or brain. This increases the risk of a heart attack, stroke, or blocked lung artery (pulmonary embolism).  Anticoagulants also increase the risk of bleeding. Try to protect yourself from cuts and other injuries that can cause bleeding. It is important to take anticoagulants exactly as told by your health care provider.  Why do I need to be on anticoagulant therapy?  You may need this medicine if you are at risk of developing a blood clot. Conditions that increase your risk of a blood clot include:  · Being born with heart disease or a heart malformation (congenital heart disease).  · Developing heart disease.  · Having had surgery, such as valve replacement.  · Having had a serious accident or other type of severe injury (trauma).  · Having certain types of cancer.  · Having certain diseases that can increase blood clotting.  · Having a high risk of stroke or heart attack.  · Having atrial fibrillation (AF).  What are the common anticoagulant medicines?  There are several types of anticoagulant medicines. The most common types are:  · Medicines that you take by mouth (oral medicines), such as:  ? Warfarin.  ? Novel oral anticoagulants (NOACs), such as:  § Direct thrombin inhibitors (dabigatran).  § Factor Xa inhibitors (apixaban, edoxaban, and rivaroxaban).  · Injections, such as:  ? Unfractionated heparin.  ? Low molecular weight heparin.  These anticoagulants work in different ways to prevent blood clots. They also have different risks and side effects.  What do I need to remember while on anticoagulant therapy?  Taking anticoagulants  · Take your medicine at the  same time every day. If you forget to take your medicine, take it as soon as you remember. Do not double your dosage of medicine if you miss a whole day. Take your normal dose and call your health care provider.  · Do not stop taking your medicine unless your health care provider approves. Stopping the medicine can increase your risk of developing a blood clot.  Taking other medicines  · Take over-the-counter and prescriptions medicines only as told by your health care provider.  · Do not take over-the-counter NSAIDs, including aspirin and ibuprofen, while you are on anticoagulant therapy. These medicines increase your risk of dangerous bleeding.  · Get approval from your health care provider before you start taking any new medicines, vitamins, or herbal products. Some of these could interfere with your therapy.  General instructions  · Keep all follow-up visits as told by your health care provider. This is important.  · If you are pregnant or trying to get pregnant, talk with a health care provider about anticoagulants. Some of these medicines are not safe to take during pregnancy.  · Tell all health care providers, including your dentist, that you are on anticoagulant therapy. It is especially important to tell providers before you have any surgery, medical procedures, or dental work done.  What precautions should I take?    · Be very careful when using knives, scissors, or other sharp objects.  · Use an electric razor instead of a blade.  · Do not use toothpicks.  · Use a soft-bristled toothbrush. Brush your teeth gently.  · Always wear shoes outdoors and wear slippers indoors.  · Be careful when cutting your fingernails and toenails.  · Place bath mats in the bathroom. If possible, install handrails as well.  · Wear gloves while you do yard work.  · Wear your seat belt.  · Prevent falls by removing loose rugs and extension cords from areas where you walk. Use a cane or walker if you need it.  · Avoid  constipation by:  ? Drinking enough fluid to keep your urine clear or pale yellow.  ? Eating foods that are high in fiber, such as fresh fruits and vegetables, whole grains, and beans.  ? Limiting foods that are high in fat and processed sugars, such as fried and sweet foods.  · Do not play contact sports or participate in other activities that have a high risk for injury.  What other precautions are important if on warfarin therapy?  If you are taking a type of anticoagulant called warfarin, make sure you:  · Work with a diet and nutrition specialist (dietitian) to make an eating plan. Do not make any sudden changes to your diet after you have started your eating plan.  · Do not drink alcohol. It can interfere with your medicine and increase your risk of an injury that causes bleeding.  · Get regular blood tests as told by your health care provider.  What are some questions to ask my health care provider?  · Why do I need anticoagulant therapy?  · What is the best anticoagulant therapy for my condition?  · How long will I need anticoagulant therapy?  · What are the side effects of anticoagulant therapy?  · When should I take my medicine? What should I do if I forget to take it?  · Will I need to have regular blood tests?  · Do I need to change my diet? Are there foods or drinks that I should avoid?  · What activities are safe for me?  · What should I do if I want to get pregnant?  Contact a health care provider if:  · You miss a dose of medicine:  ? And you are not sure what to do.  ? For more than one day.  · You have:  ? Menstrual bleeding that is heavier than normal.  ? Bloody or brown urine.  ? Easy bruising.  ? Black and tarry stool or bright red stool.  ? Side effects from your medicine.  · You feel weak or dizzy.  · You become pregnant.  Get help right away if:  · You have bleeding that will not stop within 20 minutes from:  ? The nose.  ? The gums.  ? A cut on the skin.  · You have a severe headache or  stomachache.  · You vomit or cough up blood.  · You fall or hit your head.  Summary  · Anticoagulant therapy, also called blood thinner therapy, is medicine that helps to prevent and treat blood clots.  · Anticoagulants work in different ways to prevent blood clots. They also have different risks and side effects.  · Talk with your health care provider about any precautions that you should take while on anticoagulant therapy.  This information is not intended to replace advice given to you by your health care provider. Make sure you discuss any questions you have with your health care provider.  Document Revised: 04/08/2020 Document Reviewed: 03/06/2018  [employer] Patient Education © 2021 [employer]  Atrial Fibrillation    Atrial fibrillation is a type of irregular or rapid heartbeat (arrhythmia). In atrial fibrillation, the top part of the heart (atria) beats in an irregular pattern. This makes the heart unable to pump blood normally and effectively.  The goal of treatment is to prevent blood clots from forming, control your heart rate, or restore your heartbeat to a normal rhythm. If this condition is not treated, it can cause serious problems, such as a weakened heart muscle (cardiomyopathy) or a stroke.  What are the causes?  This condition is often caused by medical conditions that damage the heart's electrical system. These include:  · High blood pressure (hypertension). This is the most common cause.  · Certain heart problems or conditions, such as heart failure, coronary artery disease, heart valve problems, or heart surgery.  · Diabetes.  · Overactive thyroid (hyperthyroidism).  · Obesity.  · Chronic kidney disease.  In some cases, the cause of this condition is not known.  What increases the risk?  This condition is more likely to develop in:  · Older people.  · People who smoke.  · Athletes who do endurance exercise.  · People who have a family history of atrial fibrillation.  · Men.  · People who use  drugs.  · People who drink a lot of alcohol.  · People who have lung conditions, such as emphysema, pneumonia, or COPD.  · People who have obstructive sleep apnea.  What are the signs or symptoms?  Symptoms of this condition include:  · A feeling that your heart is racing or beating irregularly.  · Discomfort or pain in your chest.  · Shortness of breath.  · Sudden light-headedness or weakness.  · Tiring easily during exercise or activity.  · Fatigue.  · Syncope (fainting).  · Sweating.  In some cases, there are no symptoms.  How is this diagnosed?  Your health care provider may detect atrial fibrillation when taking your pulse. If detected, this condition may be diagnosed with:  · An electrocardiogram (ECG) to check electrical signals of the heart.  · An ambulatory cardiac monitor to record your heart's activity for a few days.  · A transthoracic echocardiogram (TTE) to create pictures of your heart.  · A transesophageal echocardiogram (CHANTEL) to create even closer pictures of your heart.  · A stress test to check your blood supply while you exercise.  · Imaging tests, such as a CT scan or chest X-ray.  · Blood tests.  How is this treated?  Treatment depends on underlying conditions and how you feel when you experience atrial fibrillation. This condition may be treated with:  · Medicines to prevent blood clots or to treat heart rate or heart rhythm problems.  · Electrical cardioversion to reset the heart's rhythm.  · A pacemaker to correct abnormal heart rhythm.  · Ablation to remove the heart tissue that sends abnormal signals.  · Left atrial appendage closure to seal the area where blood clots can form.  In some cases, underlying conditions will be treated.  Follow these instructions at home:  Medicines  · Take over-the counter and prescription medicines only as told by your health care provider.  · Do not take any new medicines without talking to your health care provider.  · If you are taking blood  "thinners:  ? Talk with your health care provider before you take any medicines that contain aspirin or NSAIDs, such as ibuprofen. These medicines increase your risk for dangerous bleeding.  ? Take your medicine exactly as told, at the same time every day.  ? Avoid activities that could cause injury or bruising, and follow instructions about how to prevent falls.  ? Wear a medical alert bracelet or carry a card that lists what medicines you take.  Lifestyle         · Do not use any products that contain nicotine or tobacco, such as cigarettes, e-cigarettes, and chewing tobacco. If you need help quitting, ask your health care provider.  · Eat heart-healthy foods. Talk with a dietitian to make an eating plan that is right for you.  · Exercise regularly as told by your health care provider.  · Do not drink alcohol.  · Lose weight if you are overweight.  · Do not use drugs, including cannabis.  General instructions  · If you have obstructive sleep apnea, manage your condition as told by your health care provider.  · Do not use diet pills unless your health care provider approves. Diet pills can make heart problems worse.  · Keep all follow-up visits as told by your health care provider. This is important.  Contact a health care provider if you:  · Notice a change in the rate, rhythm, or strength of your heartbeat.  · Are taking a blood thinner and you notice more bruising.  · Tire more easily when you exercise or do heavy work.  · Have a sudden change in weight.  Get help right away if you have:    · Chest pain, abdominal pain, sweating, or weakness.  · Trouble breathing.  · Side effects of blood thinners, such as blood in your vomit, stool, or urine, or bleeding that cannot stop.  · Any symptoms of a stroke. \"BE FAST\" is an easy way to remember the main warning signs of a stroke:  ? B - Balance. Signs are dizziness, sudden trouble walking, or loss of balance.  ? E - Eyes. Signs are trouble seeing or a sudden change in " vision.  ? F - Face. Signs are sudden weakness or numbness of the face, or the face or eyelid drooping on one side.  ? A - Arms. Signs are weakness or numbness in an arm. This happens suddenly and usually on one side of the body.  ? S - Speech. Signs are sudden trouble speaking, slurred speech, or trouble understanding what people say.  ? T - Time. Time to call emergency services. Write down what time symptoms started.  · Other signs of a stroke, such as:  ? A sudden, severe headache with no known cause.  ? Nausea or vomiting.  ? Seizure.  These symptoms may represent a serious problem that is an emergency. Do not wait to see if the symptoms will go away. Get medical help right away. Call your local emergency services (911 in the [place] Do not drive yourself to the hospital.  Summary  · Atrial fibrillation is a type of irregular or rapid heartbeat (arrhythmia).  · Symptoms include a feeling that your heart is beating fast or irregularly.  · You may be given medicines to prevent blood clots or to treat heart rate or heart rhythm problems.  · Get help right away if you have signs or symptoms of a stroke.  · Get help right away if you cannot catch your breath or have chest pain or pressure.  This information is not intended to replace advice given to you by your health care provider. Make sure you discuss any questions you have with your health care provider.  Document Revised: 06/10/2020 Document Reviewed: 06/10/2020  [employer] Patient Education © 2021 [employer]

## 2021-04-23 ENCOUNTER — READMISSION MANAGEMENT (OUTPATIENT)
Dept: CALL CENTER | Facility: HOSPITAL | Age: 66
End: 2021-04-23

## 2021-04-23 ENCOUNTER — DOCUMENTATION (OUTPATIENT)
Dept: CARDIOLOGY | Facility: CLINIC | Age: 66
End: 2021-04-23

## 2021-04-23 NOTE — OUTREACH NOTE
Medical Week 4 Survey      Responses   List of hospitals in [place] patient discharged from?  [facility] [place]   Does the patient have one of the following disease processes/diagnoses(primary or secondary)?  Other   Week 4 attempt successful?  No          Noelle Nogueira RN

## 2021-04-26 ENCOUNTER — LAB (OUTPATIENT)
Dept: LAB | Facility: HOSPITAL | Age: 66
End: 2021-04-26

## 2021-04-26 DIAGNOSIS — I50.42 CHRONIC COMBINED SYSTOLIC AND DIASTOLIC CONGESTIVE HEART FAILURE (HCC): ICD-10-CM

## 2021-04-26 LAB
ANION GAP SERPL CALCULATED.3IONS-SCNC: 7 MMOL/L (ref 5–15)
BUN SERPL-MCNC: 12 MG/DL (ref 8–23)
BUN/CREAT SERPL: 10.7 (ref 7–25)
CALCIUM SPEC-SCNC: 8.9 MG/DL (ref 8.6–10.5)
CHLORIDE SERPL-SCNC: 102 MMOL/L (ref 98–107)
CO2 SERPL-SCNC: 31 MMOL/L (ref 22–29)
CREAT SERPL-MCNC: 1.12 MG/DL (ref 0.76–1.27)
GFR SERPL CREATININE-BSD FRML MDRD: 66 ML/MIN/1.73
GLUCOSE SERPL-MCNC: 115 MG/DL (ref 65–99)
POTASSIUM SERPL-SCNC: 4.6 MMOL/L (ref 3.5–5.2)
SODIUM SERPL-SCNC: 140 MMOL/L (ref 136–145)

## 2021-04-26 PROCEDURE — 80048 BASIC METABOLIC PNL TOTAL CA: CPT

## 2021-04-26 PROCEDURE — 36415 COLL VENOUS BLD VENIPUNCTURE: CPT

## 2021-04-29 ENCOUNTER — OFFICE VISIT (OUTPATIENT)
Dept: CARDIOLOGY | Facility: CLINIC | Age: 66
End: 2021-04-29

## 2021-04-29 VITALS
OXYGEN SATURATION: 99 % | HEIGHT: 67 IN | HEART RATE: 112 BPM | RESPIRATION RATE: 18 BRPM | DIASTOLIC BLOOD PRESSURE: 70 MMHG | SYSTOLIC BLOOD PRESSURE: 112 MMHG | WEIGHT: 158 LBS | BODY MASS INDEX: 24.8 KG/M2

## 2021-04-29 DIAGNOSIS — I51.3 THROMBUS OF LEFT ATRIAL APPENDAGE: ICD-10-CM

## 2021-04-29 DIAGNOSIS — Z79.01 CHRONIC ANTICOAGULATION: Chronic | ICD-10-CM

## 2021-04-29 DIAGNOSIS — Z79.899 ON AMIODARONE THERAPY: ICD-10-CM

## 2021-04-29 DIAGNOSIS — I10 HTN (HYPERTENSION), BENIGN: Chronic | ICD-10-CM

## 2021-04-29 DIAGNOSIS — F10.21 HISTORY OF ALCOHOL DEPENDENCE (HCC): ICD-10-CM

## 2021-04-29 DIAGNOSIS — I42.0 DILATED CARDIOMYOPATHY (HCC): Chronic | ICD-10-CM

## 2021-04-29 DIAGNOSIS — I48.19 PERSISTENT ATRIAL FIBRILLATION (HCC): Primary | ICD-10-CM

## 2021-04-29 DIAGNOSIS — E11.9 CONTROLLED TYPE 2 DIABETES MELLITUS WITHOUT COMPLICATION, WITHOUT LONG-TERM CURRENT USE OF INSULIN (HCC): Chronic | ICD-10-CM

## 2021-04-29 DIAGNOSIS — I34.0 NONRHEUMATIC MITRAL VALVE REGURGITATION: ICD-10-CM

## 2021-04-29 DIAGNOSIS — I50.42 CHRONIC COMBINED SYSTOLIC AND DIASTOLIC CONGESTIVE HEART FAILURE (HCC): Chronic | ICD-10-CM

## 2021-04-29 PROBLEM — R13.10 DIFFICULTY SWALLOWING SOLIDS: Status: RESOLVED | Noted: 2017-09-05 | Resolved: 2021-04-29

## 2021-04-29 PROCEDURE — 99214 OFFICE O/P EST MOD 30 MIN: CPT | Performed by: NURSE PRACTITIONER

## 2021-04-29 PROCEDURE — 93000 ELECTROCARDIOGRAM COMPLETE: CPT | Performed by: NURSE PRACTITIONER

## 2021-04-29 RX ORDER — SODIUM CHLORIDE 0.9 % (FLUSH) 0.9 %
10 SYRINGE (ML) INJECTION AS NEEDED
Status: CANCELLED | OUTPATIENT
Start: 2021-04-29

## 2021-04-29 NOTE — PATIENT INSTRUCTIONS
Heart Failure, Diagnosis    Heart failure means that your heart is not able to pump blood in the right way. This makes it hard for your body to work well. Heart failure is usually a long-term (chronic) condition. You must take good care of yourself and follow your treatment plan from your doctor.  What are the causes?  This condition may be caused by:  · High blood pressure.  · Build up of cholesterol and fat in the arteries.  · Heart attack. This injures the heart muscle.  · Heart valves that do not open and close properly.  · Damage of the heart muscle. This is also called cardiomyopathy.  · Lung disease.  · Abnormal heart rhythms.  What increases the risk?  The risk of heart failure goes up as a person ages. This condition is also more likely to develop in people who:  · Are overweight.  · Are male.  · Smoke or chew tobacco.  · Abuse alcohol or illegal drugs.  · Have taken medicines that can damage the heart.  · Have diabetes.  · Have abnormal heart rhythms.  · Have thyroid problems.  · Have low blood counts (anemia).  What are the signs or symptoms?  Symptoms of this condition include:  · Shortness of breath.  · Coughing.  · Swelling of the feet, ankles, legs, or belly.  · Losing weight for no reason.  · Trouble breathing.  · Waking from sleep because of the need to sit up and get more air.  · Rapid heartbeat.  · Being very tired.  · Feeling dizzy, or feeling like you may pass out (faint).  · Having no desire to eat.  · Feeling like you may vomit (nauseous).  · Peeing (urinating) more at night.  · Feeling confused.  How is this treated?         This condition may be treated with:  · Medicines. These can be given to treat blood pressure and to make the heart muscles stronger.  · Changes in your daily life. These may include eating a healthy diet, staying at a healthy body weight, quitting tobacco and illegal drug use, or doing exercises.  · Surgery. Surgery can be done to open blocked valves, or to put devices in  the heart, such as pacemakers.  · A donor heart (heart transplant). You will receive a healthy heart from a donor.  Follow these instructions at home:  · Treat other conditions as told by your doctor. These may include high blood pressure, diabetes, thyroid disease, or abnormal heart rhythms.  · Learn as much as you can about heart failure.  · Get support as you need it.  · Keep all follow-up visits as told by your doctor. This is important.  Summary  · Heart failure means that your heart is not able to pump blood in the right way.  · This condition is caused by high blood pressure, heart attack, or damage of the heart muscle.  · Symptoms of this condition include shortness of breath and swelling of the feet, ankles, legs, or belly. You may also feel very tired or feel like you may vomit.  · You may be treated with medicines, surgery, or changes in your daily life.  · Treat other health conditions as told by your doctor.  This information is not intended to replace advice given to you by your health care provider. Make sure you discuss any questions you have with your health care provider.  Document Revised: 03/06/2020 Document Reviewed: 03/06/2020  [employer] Patient Education © 2021 [employer]

## 2021-04-29 NOTE — PROGRESS NOTES
"    Subjective:     Encounter Date:04/29/2021      Patient ID: Ryan Sanchez is a 65 y.o. male with a previous history of paroxysmal atrial fibrillation, long term anticoagulation, chronic systolic congestive heart failure, cardiomyopathy, diabetes mellitus, and previous EtOH abuse who presents today for follow up after recent office visits and med changes.     Chief Complaint: Discharge follow up  History of Present Illness    Mr. Sanchez was first seen by our service in consultation in December 2020 for new findings of atrial fibrillation with RVR.  He had presented to the hospital with complaints of shortness of breath and found to be in atrial fibrillation, chronicity unknown at that time.  He had reported at least a month of symptoms including shortness of breath, sharp left-sided chest pain.  During that hospital evaluation the patient had echocardiogram and was found to have a reduced left ventricular systolic function.  His EF was estimated to be 40 to 45% at that time.  Mild mitral regurgitation was noted.  During that hospitalization the patient was started on oral anticoagulation, ACE inhibitor, loop diuretic, beta-blocker for management of his systolic heart failure, and medication adjustments for improvement of his heart rates.  During his hospitalization a CT angiogram was ordered of his chest for evaluation of his complaints of shortness of breath.  This revealed a left atrial appendage thrombus.  It was recommended that the patient follow-up after discharge with cardiology, however it does not appear that an appointment was made when he was discharged from the hospital.      Subsequently, the patient presented back to the hospital in March with complaints of shortness of breath and palpitations.  He was evaluated in the emergency department and admitted for further evaluation and care.  The ER note mentions that the patient had been \"converted\" however it appears he was discharged in December in " atrial fibrillation without follow-up in between.  It is likely the patient has been in persistent A. fib since that time.  During the hospitalization in March cardiology was consulted due to atrial fibrillation.  The patient had reported his heart pounding and heart palpitations.  He reported easy fatigability and shortness of breath.  He denied chest pain at that time as well.  He had previously been a heavy alcohol drinker and reported cessation of alcohol use but endorsed drinking a significant amount of coffee each day.  When he presented he was started on IV amiodarone due to rapid ventricular response on presentation.  He was converted to oral amiodarone at discharge.  His diltiazem was discontinued.  His Xarelto was continued for anticoagulation use and he was continued on his lisinopril and Toprol-XL at discharge.  He was using furosemide as needed.    He presented to the office for follow-up 4/16/21.  He was in atrial fibrillation on EKG his heart rate was 99.  He reported fatigue.  His shortness of breath was improved.  He does have episodes of shortness of breath generally related to elevated heart rates.  He reported improvement of his heart rates and shortness of breath after taking an extra dose of his metoprolol at home on a couple occasions since he has been discharged.  He was on anticoagulation with Xarelto, but had not been taking this with his largest meal.  A repeat echocardiogram was obtained during his hospitalization in March.  His left ventricular systolic function has reduced since December ( 40-45% down to 30-35%).   It is conceivable that this is due to poor rate control with atrial fibrillation.  He has moderate mitral regurgitation noted on his most recent echo that had previously been noted to be mild.  This is likely secondary due to his worsening LVEF.  He was mildly volume overloaded during his most recent hospitalization and received some IV diuretics during that time as  "well.    He was transitioned from lisinopril to Entresto. His beta blocker dose was increased and he was instructed to monitor weights, and symptoms, and use lasix as needed. He was instructed to change Xarelto to taking with his largest meal. Labs were ordered for follow up after starting Entresto.    He presents today. Still has episodes of \"fluttering\" in his chest. He reports yesterday was significant and he relates worsening as he received his second Covid vaccination.  He remains anticoagulated and has had no bleeding issues. He is now taking with largest meal. He washed out lisinopril and started Entresto - tolerating well. He denies any weight gain, use of lasix, orthopnea, worsening symptoms. His BP has been stable.  He does not tolerate heavy activity or heavy lifting. He still has some fatigue.       The following portions of the patient's history were reviewed and updated as appropriate: allergies, current medications, past family history, past medical history, past social history and past surgical history.     No Known Allergies      Current Outpatient Medications:   •  acetaminophen (TYLENOL) 325 MG tablet, Take 2 tablets by mouth Every 4 (Four) Hours As Needed for Mild Pain ., Disp: 30 tablet, Rfl: 0  •  amiodarone (PACERONE) 200 MG tablet, Take 1 tablet by mouth Daily., Disp: 30 tablet, Rfl: 1  •  furosemide (Lasix) 40 MG tablet, Take 1 tablet by mouth Daily As Needed (Weight gain, shortness of breath, swelling)., Disp: 30 tablet, Rfl: 3  •  loratadine (CLARITIN) 10 MG tablet, Take 10 mg by mouth As Needed., Disp: , Rfl:   •  metFORMIN (GLUCOPHAGE) 500 MG tablet, Take 500 mg by mouth Daily. Pt takes one tablet daily, Disp: , Rfl:   •  metoprolol succinate XL (TOPROL-XL) 50 MG 24 hr tablet, Take 1 tablet by mouth Daily., Disp: 30 tablet, Rfl: 11  •  pantoprazole (PROTONIX) 40 MG EC tablet, Take 1 tablet by mouth Daily. (Patient taking differently: Take 40 mg by mouth 2 (Two) Times a Week.), Disp: 30 " "tablet, Rfl: 3  •  rivaroxaban (XARELTO) 20 MG tablet, Take 20 mg by mouth Daily., Disp: , Rfl:   •  sacubitril-valsartan (ENTRESTO) 24-26 MG tablet, Take 1 tablet by mouth 2 (Two) Times a Day., Disp: 60 tablet, Rfl: 11  •  venlafaxine (EFFEXOR) 50 MG tablet, Take 50 mg by mouth Daily., Disp: , Rfl:       Review of Systems   Constitutional: Positive for malaise/fatigue. Negative for weight gain.   Cardiovascular: Positive for palpitations. Negative for chest pain, dyspnea on exertion, irregular heartbeat, leg swelling, near-syncope and orthopnea.   Respiratory: Positive for shortness of breath. Negative for wheezing.    Hematologic/Lymphatic: Negative for bleeding problem. Bruises/bleeds easily.   Gastrointestinal: Negative for nausea and vomiting.   Neurological: Negative for dizziness, headaches and light-headedness.   Psychiatric/Behavioral: Negative for altered mental status.         ECG 12 Lead    Date/Time: 4/29/2021 1:42 PM  Performed by: Kortney Valentino APRN  Authorized by: Kortney Valentino APRN   Comparison: compared with previous ECG from 4/16/2021  Similar to previous ECG  Rhythm: atrial fibrillation  Rate: tachycardic  BPM: 112  Conduction: conduction normal  ST Segments: ST segments normal  T Waves: T waves normal  QRS axis: left    Clinical impression: abnormal EKG             /70 (BP Location: Right arm, Patient Position: Sitting, Cuff Size: Adult)   Pulse 112   Resp 18   Ht 170.2 cm (67\")   Wt 71.7 kg (158 lb)   SpO2 99%   BMI 24.75 kg/m²        Objective:     Vitals reviewed.   Constitutional:       Appearance: Well-developed, well-groomed and not in distress. Chronically ill-appearing.   HENT:      Head: Normocephalic and atraumatic.   Pulmonary:      Effort: Pulmonary effort is normal.      Breath sounds: Normal breath sounds.   Cardiovascular:      Normal rate. Irregularly irregular rhythm.      Murmurs: There is no murmur.      No gallop. No rub.   Edema:     Peripheral edema " absent.   Abdominal:      General: Bowel sounds are normal.   Musculoskeletal:      Cervical back: Normal range of motion and neck supple. Skin:     General: Skin is warm and dry.   Neurological:      Mental Status: Alert, oriented to person, place, and time and oriented to person, place and time.   Psychiatric:         Mood and Affect: Mood and affect normal.         Behavior: Behavior is cooperative.       Lab Review:    Results for orders placed during the hospital encounter of 03/25/21    Adult Transthoracic Echo Complete With Contrast if Necessary Per Protocol    Interpretation Summary  · Left ventricular ejection fraction appears to be 31 - 35%. Left ventricular systolic function is moderately decreased.  · Left ventricular diastolic function is consistent with (grade II w/high LAP) pseudonormalization.  · The left atrial cavity is mildly dilated.  · Mitral annular calcification is present. Moderate mitral valve regurgitation is present. No significant mitral valve stenosis is present.  · No evidence of pulmonary hypertension is present.    CT angiogram chest: 12/26/2020-impression relates left atrial appendage thrombus.      Assessment:          Diagnosis Plan   1. Persistent atrial fibrillation (CMS/HCC)  Cardioversion External in Cardiology Department    Adult Transesophageal Echo (CHANTEL) W/ Cont if Necessary Per Protocol    sodium chloride 0.9 % flush 10 mL    Full Pulmonary Function Test With Bronchodilator   2. On amiodarone therapy  Full Pulmonary Function Test With Bronchodilator   3. Chronic combined systolic and diastolic congestive heart failure (CMS/HCC)     4. Dilated cardiomyopathy (CMS/HCC)     5. Thrombus of left atrial appendage  Cardioversion External in Cardiology Department    Adult Transesophageal Echo (CHANTEL) W/ Cont if Necessary Per Protocol    sodium chloride 0.9 % flush 10 mL   6. Chronic anticoagulation     7. HTN (hypertension), benign     8. Controlled type 2 diabetes mellitus without  complication, without long-term current use of insulin (CMS/[facility])     9. History of alcohol dependence (CMS/[facility])     10. Nonrheumatic mitral valve regurgitation            Plan:       -Persistent atrial fibrillation: The patient presents to the office today for follow-up.  He remains in atrial fibrillation rates are 112.   He is more symptomatic with elevated hearts which supports his presentation to the emergency department in December as well as March.  I would favor that the patient has remained in atrial fibrillation since his presentation in December and chronicity at that time was unknown.  He has had medication adjustments with recent increase in beta blocker for rate control and as use in GDMT for Systolic heart failure.  Currently the patient is on amiodarone 200 mg daily.  He is anticoagulated with Xarelto.  He has recently started taking this with food. We will schedule his Cardioversion and CHANTEL today. We will plan for CHANTEL due to his previously not taking his Xarelto with food.  We will need to establish baseline PFT as well given his start on amiodarone.     -Chronic combined systolic and diastolic congestive heart failure:   Initially when he was hospitalized, his EF was 40 to 45% (December) and since that time has dropped to 30 to 35% (March).  At last visit we transitioned from Lisinopril to Entresto. His BMP and blood pressure remained stable.  His Toprol XL was increased from 25 mg to 50 mg daily.  He has lasix at home for PRN use which he has not needed.   Continue current GDMT for management of CHF.     -Dilated cardiomyopathy: He has a mildly dilated left ventricle noted on echocardiogram.  He has a reduced LVEF most recent estimation is 30-35 percent.  He is on guideline directed medical therapy.     -Left atrial appendage thrombus: This was noted on CT angiogram of his chest in December.  During that hospitalization he was also found to have new diagnosis of atrial fibrillation.  He was started  on anticoagulation at that time.      -Chronic anticoagulation: Initiated in December 2020.  He has continued anticoagulation and is on Xarelto 20 mg daily. Continue anticoagulation due to the history of atrial fibrillation for stroke risk reduction.    -Hypertension: Well-controlled on current medications.    -Diabetes mellitus: The patient is on Metformin for management of his diabetes and follows with his primary care physician's office.    -EtOH abuse: The patient has a history of EtOH abuse.  The patient is currently abstaining from alcohol use.    -Moderate MR: noted on most recent echo. ? Functional MR due to his decline in LVEF - we will monitor.       DCCV/CHANTEL with Dr. Coto.  Outpt PFT continue GDMT

## 2021-04-30 ENCOUNTER — TRANSCRIBE ORDERS (OUTPATIENT)
Dept: LAB | Facility: HOSPITAL | Age: 66
End: 2021-04-30

## 2021-04-30 DIAGNOSIS — Z01.818 PRE-OP TESTING: Primary | ICD-10-CM

## 2021-05-03 ENCOUNTER — LAB (OUTPATIENT)
Dept: LAB | Facility: HOSPITAL | Age: 66
End: 2021-05-03

## 2021-05-03 LAB — SARS-COV-2 ORF1AB RESP QL NAA+PROBE: NOT DETECTED

## 2021-05-03 PROCEDURE — U0004 COV-19 TEST NON-CDC HGH THRU: HCPCS | Performed by: NURSE PRACTITIONER

## 2021-05-03 PROCEDURE — C9803 HOPD COVID-19 SPEC COLLECT: HCPCS | Performed by: NURSE PRACTITIONER

## 2021-05-04 ENCOUNTER — ANESTHESIA EVENT (OUTPATIENT)
Dept: CARDIOLOGY | Facility: HOSPITAL | Age: 66
End: 2021-05-04

## 2021-05-04 ENCOUNTER — TRANSCRIBE ORDERS (OUTPATIENT)
Dept: LAB | Facility: HOSPITAL | Age: 66
End: 2021-05-04

## 2021-05-04 DIAGNOSIS — Z01.818 PRE-OP TESTING: Primary | ICD-10-CM

## 2021-05-05 ENCOUNTER — HOSPITAL ENCOUNTER (OUTPATIENT)
Dept: CARDIOLOGY | Facility: HOSPITAL | Age: 66
Discharge: HOME | End: 2021-05-05

## 2021-05-05 ENCOUNTER — ANESTHESIA (OUTPATIENT)
Dept: CARDIOLOGY | Facility: HOSPITAL | Age: 66
End: 2021-05-05

## 2021-05-05 ENCOUNTER — HOSPITAL ENCOUNTER (OUTPATIENT)
Dept: CARDIOLOGY | Facility: HOSPITAL | Age: 66
Setting detail: HOSPITAL OUTPATIENT SURGERY
Discharge: HOME | End: 2021-05-05

## 2021-05-05 VITALS
WEIGHT: 155.6 LBS | HEIGHT: 67 IN | OXYGEN SATURATION: 100 % | DIASTOLIC BLOOD PRESSURE: 84 MMHG | SYSTOLIC BLOOD PRESSURE: 113 MMHG | RESPIRATION RATE: 14 BRPM | TEMPERATURE: 97.5 F | BODY MASS INDEX: 24.42 KG/M2 | HEART RATE: 58 BPM

## 2021-05-05 DIAGNOSIS — I48.19 PERSISTENT ATRIAL FIBRILLATION (HCC): ICD-10-CM

## 2021-05-05 DIAGNOSIS — I51.3 THROMBUS OF LEFT ATRIAL APPENDAGE: ICD-10-CM

## 2021-05-05 LAB
ANION GAP SERPL CALCULATED.3IONS-SCNC: 8 MMOL/L (ref 5–15)
BUN SERPL-MCNC: 17 MG/DL (ref 8–23)
BUN/CREAT SERPL: 15.5 (ref 7–25)
CALCIUM SPEC-SCNC: 9.4 MG/DL (ref 8.6–10.5)
CHLORIDE SERPL-SCNC: 105 MMOL/L (ref 98–107)
CO2 SERPL-SCNC: 30 MMOL/L (ref 22–29)
CREAT SERPL-MCNC: 1.1 MG/DL (ref 0.76–1.27)
GFR SERPL CREATININE-BSD FRML MDRD: 67 ML/MIN/1.73
GLUCOSE SERPL-MCNC: 148 MG/DL (ref 65–99)
MAXIMAL PREDICTED HEART RATE: 154 BPM
POTASSIUM SERPL-SCNC: 4.9 MMOL/L (ref 3.5–5.2)
SODIUM SERPL-SCNC: 143 MMOL/L (ref 136–145)
STRESS TARGET HR: 131 BPM

## 2021-05-05 PROCEDURE — 93320 DOPPLER ECHO COMPLETE: CPT

## 2021-05-05 PROCEDURE — 93010 ELECTROCARDIOGRAM REPORT: CPT | Performed by: INTERNAL MEDICINE

## 2021-05-05 PROCEDURE — 93005 ELECTROCARDIOGRAM TRACING: CPT | Performed by: NURSE PRACTITIONER

## 2021-05-05 PROCEDURE — 93312 ECHO TRANSESOPHAGEAL: CPT

## 2021-05-05 PROCEDURE — 93320 DOPPLER ECHO COMPLETE: CPT | Performed by: INTERNAL MEDICINE

## 2021-05-05 PROCEDURE — 93325 DOPPLER ECHO COLOR FLOW MAPG: CPT | Performed by: INTERNAL MEDICINE

## 2021-05-05 PROCEDURE — 93312 ECHO TRANSESOPHAGEAL: CPT | Performed by: INTERNAL MEDICINE

## 2021-05-05 PROCEDURE — 93325 DOPPLER ECHO COLOR FLOW MAPG: CPT

## 2021-05-05 PROCEDURE — 80048 BASIC METABOLIC PNL TOTAL CA: CPT | Performed by: NURSE PRACTITIONER

## 2021-05-05 PROCEDURE — 92960 CARDIOVERSION ELECTRIC EXT: CPT

## 2021-05-05 PROCEDURE — 92960 CARDIOVERSION ELECTRIC EXT: CPT | Performed by: INTERNAL MEDICINE

## 2021-05-05 PROCEDURE — 25010000002 PROPOFOL 10 MG/ML EMULSION: Performed by: NURSE ANESTHETIST, CERTIFIED REGISTERED

## 2021-05-05 RX ORDER — PROPOFOL 10 MG/ML
VIAL (ML) INTRAVENOUS AS NEEDED
Status: DISCONTINUED | OUTPATIENT
Start: 2021-05-05 | End: 2021-05-05 | Stop reason: SURG

## 2021-05-05 RX ORDER — LIDOCAINE HYDROCHLORIDE 20 MG/ML
INJECTION, SOLUTION EPIDURAL; INFILTRATION; INTRACAUDAL; PERINEURAL AS NEEDED
Status: DISCONTINUED | OUTPATIENT
Start: 2021-05-05 | End: 2021-05-05 | Stop reason: SURG

## 2021-05-05 RX ORDER — SODIUM CHLORIDE 9 MG/ML
INJECTION, SOLUTION INTRAVENOUS CONTINUOUS PRN
Status: DISCONTINUED | OUTPATIENT
Start: 2021-05-05 | End: 2021-05-05 | Stop reason: SURG

## 2021-05-05 RX ORDER — SODIUM CHLORIDE 0.9 % (FLUSH) 0.9 %
10 SYRINGE (ML) INJECTION AS NEEDED
Status: DISCONTINUED | OUTPATIENT
Start: 2021-05-05 | End: 2021-05-06 | Stop reason: HOSPADM

## 2021-05-05 RX ADMIN — PROPOFOL 350 MG: 10 INJECTION, EMULSION INTRAVENOUS at 08:20

## 2021-05-05 RX ADMIN — LIDOCAINE HYDROCHLORIDE 100 MG: 20 INJECTION, SOLUTION EPIDURAL; INFILTRATION; INTRACAUDAL; PERINEURAL at 08:21

## 2021-05-05 RX ADMIN — SODIUM CHLORIDE: 9 INJECTION, SOLUTION INTRAVENOUS at 08:09

## 2021-05-05 RX ADMIN — LIDOCAINE HYDROCHLORIDE 100 MG: 20 INJECTION, SOLUTION EPIDURAL; INFILTRATION; INTRACAUDAL; PERINEURAL at 08:20

## 2021-05-05 NOTE — ANESTHESIA PREPROCEDURE EVALUATION
Anesthesia Evaluation     history of anesthetic complications: PONV  NPO Solid Status: > 8 hours  NPO Liquid Status: > 8 hours           Airway   Mallampati: II  Dental - normal exam     Pulmonary - normal exam   (-) asthma, recent URI, sleep apnea, not a smoker  Cardiovascular   Exercise tolerance: excellent (>7 METS)    ECG reviewed  PT is on anticoagulation therapy  Rhythm: irregular    (+) hypertension well controlled, valvular problems/murmurs (diagnosed 20 years ago, no issues) MVP, dysrhythmias Atrial Fib, CHF Diastolic >=55% and Systolic <55%,   (-) pacemaker, past MI, angina, cardiac stents    ROS comment: Prior history of left atrial thrombus    Echo:  · Left ventricular ejection fraction appears to be 31 - 35%. Left ventricular systolic function is moderately decreased.  · Left ventricular diastolic function is consistent with (grade II w/high LAP) pseudonormalization.  · The left atrial cavity is mildly dilated.  · Mitral annular calcification is present. Moderate mitral valve regurgitation is present. No significant mitral valve stenosis is present.  · No evidence of pulmonary hypertension is present.    Neuro/Psych  (-) seizures, TIA, CVA  GI/Hepatic/Renal/Endo    (+)  GERD well controlled,  diabetes mellitus,   (-) liver disease, no renal disease    Musculoskeletal     Abdominal  - normal exam   Substance History      OB/GYN          Other                        Anesthesia Plan    ASA 3     MAC   (My preoperative assessment completed via chart review. CRNA to complete airway assessment and review medical history)    Anesthetic plan, all risks, benefits, and alternatives have been provided, discussed and informed consent has been obtained with: patient.

## 2021-05-05 NOTE — ANESTHESIA POSTPROCEDURE EVALUATION
Patient: Ryan Sanchez    Procedure Summary     Date: 05/05/21 Room / Location: [facility] CATH LAB; [facility] CARDIOLOGY    Anesthesia Start: 0810 Anesthesia Stop: 0856    Procedures:       ADULT TRANSESOPHAGEAL ECHO (CHANTEL) W/ CONT IF NECESSARY PER PROTOCOL      CARDIOVERSION EXTERNAL IN CARDIOLOGY DEPARTMENT Diagnosis:       Persistent atrial fibrillation (CMS/HCC)      Thrombus of left atrial appendage      (Arrhythmia)      (AFib)      (Pre-cardioversion)      (persistent AF)    Scheduled Providers: Juan Daniel Coto MD Provider: Raffy Perkins CRNA    Anesthesia Type: MAC ASA Status: 3          Anesthesia Type: MAC    Vitals  No vitals data found for the desired time range.          Post Anesthesia Care and Evaluation    Patient location during evaluation: PHASE II  Patient participation: complete - patient participated  Level of consciousness: awake and alert  Pain management: adequate  Airway patency: patent  Anesthetic complications: No anesthetic complications    Cardiovascular status: acceptable  Respiratory status: acceptable  Hydration status: acceptable

## 2021-05-07 LAB
QT INTERVAL: 434 MS
QT INTERVAL: 502 MS
QTC INTERVAL: 471 MS
QTC INTERVAL: 491 MS

## 2021-05-10 ENCOUNTER — APPOINTMENT (OUTPATIENT)
Dept: PULMONOLOGY | Facility: HOSPITAL | Age: 66
End: 2021-05-10

## 2021-06-07 ENCOUNTER — OFFICE VISIT (OUTPATIENT)
Dept: CARDIOLOGY | Facility: CLINIC | Age: 66
End: 2021-06-07

## 2021-06-07 ENCOUNTER — PREP FOR SURGERY (OUTPATIENT)
Dept: OTHER | Facility: HOSPITAL | Age: 66
End: 2021-06-07

## 2021-06-07 VITALS
HEIGHT: 67 IN | SYSTOLIC BLOOD PRESSURE: 118 MMHG | WEIGHT: 165 LBS | DIASTOLIC BLOOD PRESSURE: 64 MMHG | HEART RATE: 136 BPM | OXYGEN SATURATION: 99 % | BODY MASS INDEX: 25.9 KG/M2

## 2021-06-07 DIAGNOSIS — I48.19 PERSISTENT ATRIAL FIBRILLATION (HCC): Primary | ICD-10-CM

## 2021-06-07 DIAGNOSIS — I42.0 DILATED CARDIOMYOPATHY (HCC): Chronic | ICD-10-CM

## 2021-06-07 DIAGNOSIS — Z79.01 CHRONIC ANTICOAGULATION: Chronic | ICD-10-CM

## 2021-06-07 DIAGNOSIS — I50.42 CHRONIC COMBINED SYSTOLIC AND DIASTOLIC CONGESTIVE HEART FAILURE (HCC): Chronic | ICD-10-CM

## 2021-06-07 PROCEDURE — 93000 ELECTROCARDIOGRAM COMPLETE: CPT | Performed by: NURSE PRACTITIONER

## 2021-06-07 PROCEDURE — 99214 OFFICE O/P EST MOD 30 MIN: CPT | Performed by: NURSE PRACTITIONER

## 2021-06-07 RX ORDER — SODIUM CHLORIDE 0.9 % (FLUSH) 0.9 %
10 SYRINGE (ML) INJECTION EVERY 12 HOURS SCHEDULED
Status: CANCELLED | OUTPATIENT
Start: 2021-06-07

## 2021-06-07 RX ORDER — AMIODARONE HYDROCHLORIDE 200 MG/1
200 TABLET ORAL 2 TIMES DAILY
Qty: 60 TABLET | Refills: 3 | Status: SHIPPED | OUTPATIENT
Start: 2021-06-07 | End: 2022-02-10

## 2021-06-07 RX ORDER — SODIUM CHLORIDE 0.9 % (FLUSH) 0.9 %
10 SYRINGE (ML) INJECTION AS NEEDED
Status: CANCELLED | OUTPATIENT
Start: 2021-06-07

## 2021-06-07 NOTE — PROGRESS NOTES
Subjective:     Encounter Date:06/07/21      Patient ID: Ryan Sanchez is a 65 y.o. male with a previous history of paroxysmal atrial fibrillation, long term anticoagulation, chronic systolic congestive heart failure, cardiomyopathy, diabetes mellitus, and previous EtOH abuse who presents today for follow up after recent outpatient cardioversion.    Chief Complaint: Cardioversion follow up  Atrial Fibrillation  Presents for follow-up visit. Symptoms include palpitations and tachycardia. Symptoms are negative for chest pain, dizziness, hemodynamic instability, hypertension, hypotension, shortness of breath, syncope and weakness. (+Weight gain ) The symptoms have been stable. Past medical history includes atrial fibrillation and CHF. There are no medication compliance problems.   Congestive Heart Failure  Presents for follow-up visit. Associated symptoms include palpitations and unexpected weight change. Pertinent negatives include no chest pain, chest pressure, edema, fatigue, near-syncope, orthopnea, paroxysmal nocturnal dyspnea or shortness of breath. The symptoms have been worsening. Compliance with total regimen is %. Compliance with diet is %. Compliance with exercise is %. Compliance with medications is %.     Mr. Sanchez was first seen by our service in consultation in December 2020 for new findings of atrial fibrillation with RVR.  He had presented to the hospital with complaints of shortness of breath and found to be in atrial fibrillation, chronicity unknown at that time.  He had reported at least a month of symptoms including shortness of breath, sharp left-sided chest pain.  During that hospital evaluation the patient had echocardiogram and was found to have a reduced left ventricular systolic function.  His EF was estimated to be 40 to 45% at that time.  Mild mitral regurgitation was noted.  During that hospitalization the patient was started on oral anticoagulation, ACE  "inhibitor, loop diuretic, beta-blocker for management of his systolic heart failure, and medication adjustments for improvement of his heart rates.  During his hospitalization a CT angiogram was ordered of his chest for evaluation of his complaints of shortness of breath.  This revealed a left atrial appendage thrombus.  It was recommended that the patient follow-up after discharge with cardiology, however it does not appear that an appointment was made when he was discharged from the hospital.      Subsequently, the patient presented back to the hospital in March with complaints of shortness of breath and palpitations.  He was evaluated in the emergency department and admitted for further evaluation and care.  The ER note mentions that the patient had been \"converted\" however it appears he was discharged in December in atrial fibrillation without follow-up in between.  It is likely the patient has been in persistent A. fib since that time.  During the hospitalization in March cardiology was consulted due to atrial fibrillation.  The patient had reported his heart pounding and heart palpitations.  He reported easy fatigability and shortness of breath.  He denied chest pain at that time as well.  He had previously been a heavy alcohol drinker and reported cessation of alcohol use but endorsed drinking a significant amount of coffee each day.  When he presented he was started on IV amiodarone due to rapid ventricular response on presentation.  He was converted to oral amiodarone at discharge.  His diltiazem was discontinued.  His Xarelto was continued for anticoagulation use and he was continued on his lisinopril and Toprol-XL at discharge.  He was using furosemide as needed.    He presented to the office for follow-up 4/16/21.  He was in atrial fibrillation on EKG his heart rate was 99.  He reported fatigue.  His shortness of breath was improved.  He does have episodes of shortness of breath generally related to " elevated heart rates.  He reported improvement of his heart rates and shortness of breath after taking an extra dose of his metoprolol at home on a couple occasions since he has been discharged.  He was on anticoagulation with Xarelto, but had not been taking this with his largest meal.  A repeat echocardiogram was obtained during his hospitalization in March.  His left ventricular systolic function has reduced since December (40-45% down to 30-35%).   It is conceivable that this is due to poor rate control with atrial fibrillation.  He has moderate mitral regurgitation noted on his most recent echo that had previously been noted to be mild.  This is likely secondary due to his worsening LVEF.  He was mildly volume overloaded during his most recent hospitalization and received some IV diuretics during that time as well.    He was transitioned from lisinopril to Entresto. His beta blocker dose was increased and he was instructed to monitor weights, and symptoms, and use lasix as needed. He was instructed to change Xarelto to taking with his largest meal. He presented on 4/29 after initiating Entresto and was doing well from a CHF standpoint. He was still having some fatigue at that time. He was scheduled for outpatient cardioversion.     He was cardioverted successfully with CHANTEL/Cardioversion on 5/5/21.  He felt improved after discharge. He was doing well and noticed and increase in heart rate with slow weight gain for the past 2 weeks. EKG today is consistent with AF RVR. He has been taking his Entresto and Toprol as scheduled. He held is Xarelto on his own accord for a root canal approximately 10 days ago. He has to have his root canal finished in the near future. He will require hold of anticoagulation at that time as well. He will then need to be evaluated for crown placement of permanent filling.     He currently denies any orthopnea, PND, lower extremity swelling,chest pain. He has some fluttering in his chest  but reports a weight gain of approximately 10 pounds in the past month and fatigue.  He has Lasix as needed at home but has not taken any recently.  He is taking his Xarelto as prescribed and denies any bleeding.  He reports that he tires easily.      The following portions of the patient's history were reviewed and updated as appropriate: allergies, current medications, past family history, past medical history, past social history and past surgical history.     No Known Allergies      Current Outpatient Medications:   •  acetaminophen (TYLENOL) 325 MG tablet, Take 2 tablets by mouth Every 4 (Four) Hours As Needed for Mild Pain ., Disp: 30 tablet, Rfl: 0  •  amiodarone (PACERONE) 200 MG tablet, Take 1 tablet by mouth 2 (Two) Times a Day., Disp: 60 tablet, Rfl: 3  •  furosemide (Lasix) 40 MG tablet, Take 1 tablet by mouth Daily As Needed (Weight gain, shortness of breath, swelling)., Disp: 30 tablet, Rfl: 3  •  loratadine (CLARITIN) 10 MG tablet, Take 10 mg by mouth As Needed., Disp: , Rfl:   •  metFORMIN (GLUCOPHAGE) 500 MG tablet, Take 500 mg by mouth Daily. Pt takes one tablet daily, Disp: , Rfl:   •  metoprolol succinate XL (TOPROL-XL) 50 MG 24 hr tablet, Take 1 tablet by mouth Daily., Disp: 30 tablet, Rfl: 11  •  pantoprazole (PROTONIX) 40 MG EC tablet, Take 1 tablet by mouth Daily., Disp: 30 tablet, Rfl: 3  •  rivaroxaban (XARELTO) 20 MG tablet, Take 20 mg by mouth Daily., Disp: , Rfl:   •  sacubitril-valsartan (ENTRESTO) 24-26 MG tablet, Take 1 tablet by mouth 2 (Two) Times a Day., Disp: 60 tablet, Rfl: 11  •  venlafaxine (EFFEXOR) 50 MG tablet, Take 50 mg by mouth Daily., Disp: , Rfl:       Review of Systems   Constitutional: Positive for malaise/fatigue, unexpected weight change and weight gain. Negative for chills, diaphoresis, fatigue and fever.   Cardiovascular: Positive for palpitations. Negative for chest pain, dyspnea on exertion, irregular heartbeat, leg swelling, near-syncope, orthopnea and syncope.  "  Respiratory: Negative for shortness of breath and wheezing.    Hematologic/Lymphatic: Negative for bleeding problem. Bruises/bleeds easily.   Gastrointestinal: Negative for nausea and vomiting.   Neurological: Negative for dizziness, headaches, light-headedness and weakness.   Psychiatric/Behavioral: Negative for altered mental status.         ECG 12 Lead    Date/Time: 6/7/2021 3:16 PM  Performed by: Kortney Valentino APRN  Authorized by: Kortney Valentino APRN   Comparison: compared with previous ECG from 5/5/2021  Comparison to previous ECG: Atrial fibrillation replaces sinus bradycardia  Rhythm: atrial fibrillation  Rate: tachycardic  BPM: 136  Conduction: conduction normal    Clinical impression: abnormal EKG             /64   Pulse (!) 136   Ht 170.2 cm (67\")   Wt 74.8 kg (165 lb)   SpO2 99%   BMI 25.84 kg/m²        Objective:     Vitals reviewed.   Constitutional:       Appearance: Well-developed, well-groomed and not in distress. Chronically ill-appearing.   HENT:      Head: Normocephalic and atraumatic.   Pulmonary:      Effort: Pulmonary effort is normal.      Breath sounds: Normal breath sounds.   Cardiovascular:      Tachycardia present. Irregularly irregular rhythm.      Murmurs: There is no murmur.      No gallop. No rub.   Edema:     Peripheral edema absent.   Abdominal:      General: Bowel sounds are normal.   Musculoskeletal:      Cervical back: Normal range of motion and neck supple. Skin:     General: Skin is warm and dry.   Neurological:      Mental Status: Alert, oriented to person, place, and time and oriented to person, place and time.   Psychiatric:         Mood and Affect: Mood and affect normal.         Behavior: Behavior is cooperative.       Lab Review:    Results for orders placed during the hospital encounter of 05/05/21    Adult Transesophageal Echo (CHANTEL) W/ Cont if Necessary Per Protocol    Interpretation Summary  · Left ventricular systolic function is mildly decreased. " Left ventricular ejection fraction appears to be 46 - 50%.  · No evidence of a left atrial appendage thrombus.  · Trace to mild mitral valve regurgitation is present.    CT angiogram chest: 12/26/2020-impression relates left atrial appendage thrombus.      Assessment:          Diagnosis Plan   1. Persistent atrial fibrillation (CMS/HCC)  Ambulatory Referral to Cardiac Electrophysiology   2. Chronic combined systolic and diastolic congestive heart failure (CMS/HCC)     3. Dilated cardiomyopathy (CMS/HCC)     4. Chronic anticoagulation            Plan:       -Persistent atrial fibrillation: The patient presents to the office today for follow-up.  He is back out of rhythm following cardioversion on 5/5/21.  He feels that he went out of rhythm approximately 2 weeks ago. He has been compliant with amiodarone.  He is anticoagulated with Xarelto.  He held this medication approximately 10 days ago - on his own. We discussed uninterrupted anticoagulation, unless advised by cardiology moving forward. He held for root canal procedure and requires phase 2 of his root canal soon. He will need repeat cardioversion. We will have his root canal completion moved up ASAP as this must be completed in a timely fashion.  Following his root canal procedure he will need uninterrupted anticoagulation for 4 weeks and repeat cardioversion at that time.  Following his cardioversion he will need uninterrupted anticoagulation for an additional 4 weeks.  Following that period of time it would be safe to hold if needed for 48 hours for further dental procedures.  Due to the finding of atrial fibrillation again today status post recent cardioversion in the setting of amiodarone therapy we will refer the patient for an outpatient office visit with electrophysiology.  (He has previously had normal TSH.  His baseline PFTs were ordered and are scheduled but have not been completed).  He remains on amiodarone, now 200 mg BID.     -Chronic combined  systolic and diastolic congestive heart failure:   Initially when he was hospitalized, his EF was 40 to 45% (December) and then dropped to 30 to 35% (March).  He was transitioned from Lisinopril to Entresto. His BMP and blood pressure remained stable.  His Toprol XL was increased from 25 mg to 50 mg daily.  He has lasix at home for PRN.  His follow-up CHANTEL, prior to his cardioversion in May, revealed improvement of his LVEF to 45 to 50%.  Continue current GDMT for management of CHF.  His Lasix has not been required.  He has had 10 pound weight gain over the last month.  This may be secondary to his atrial fibrillation rapid ventricular response found on EKG today.  He is to take Lasix daily for 4 days.  He will call our office if he has further weight gain or does not have improvement after short-term Lasix.    -Dilated cardiomyopathy: He has a mildly dilated left ventricle noted on echocardiogram.  He had a reduced LVEF of 30-35% on echocardiogram which was performed in March 2021.  He recently required transesophageal echocardiogram prior to his cardioversion.  LVEF, at that time, was estimated to be 45-50%. He is on guideline directed medical therapy due to his previous left ventricular systolic dysfunction.  He is to continue medications.    -Chronic anticoagulation: Initiated in December 2020.  He held this medication approximately 10 days ago for dental procedures.  He has been up and coming root canal procedure which we are trying to get rescheduled as soon as possible.  Following that he will require uninterrupted anticoagulation in preparation for future outpatient cardioversion.      Addendum: The patient's root canal procedure is scheduled for Monday, 6/14/2021.  We will place orders for cardioversion after he has resumed anticoagulation for 4 weeks without interruption.  This was discussed with the patient who verbalized understanding.

## 2021-06-10 ENCOUNTER — TRANSCRIBE ORDERS (OUTPATIENT)
Dept: LAB | Facility: HOSPITAL | Age: 66
End: 2021-06-10

## 2021-06-10 DIAGNOSIS — Z01.818 PRE-OP TESTING: Primary | ICD-10-CM

## 2021-06-15 ENCOUNTER — LAB (OUTPATIENT)
Dept: LAB | Facility: HOSPITAL | Age: 66
End: 2021-06-15

## 2021-06-15 DIAGNOSIS — Z01.818 PRE-OP TESTING: ICD-10-CM

## 2021-06-15 LAB — SARS-COV-2 ORF1AB RESP QL NAA+PROBE: NOT DETECTED

## 2021-06-15 PROCEDURE — C9803 HOPD COVID-19 SPEC COLLECT: HCPCS

## 2021-06-15 PROCEDURE — U0004 COV-19 TEST NON-CDC HGH THRU: HCPCS

## 2021-06-18 ENCOUNTER — HOSPITAL ENCOUNTER (OUTPATIENT)
Dept: PULMONOLOGY | Facility: HOSPITAL | Age: 66
Discharge: HOME | End: 2021-06-18
Admitting: NURSE PRACTITIONER

## 2021-06-18 DIAGNOSIS — I48.19 PERSISTENT ATRIAL FIBRILLATION (HCC): ICD-10-CM

## 2021-06-18 DIAGNOSIS — Z79.899 ON AMIODARONE THERAPY: ICD-10-CM

## 2021-06-18 PROCEDURE — 94726 PLETHYSMOGRAPHY LUNG VOLUMES: CPT | Performed by: INTERNAL MEDICINE

## 2021-06-18 PROCEDURE — 94729 DIFFUSING CAPACITY: CPT | Performed by: INTERNAL MEDICINE

## 2021-06-18 PROCEDURE — 94729 DIFFUSING CAPACITY: CPT

## 2021-06-18 PROCEDURE — 94726 PLETHYSMOGRAPHY LUNG VOLUMES: CPT

## 2021-06-18 PROCEDURE — 94060 EVALUATION OF WHEEZING: CPT

## 2021-06-18 PROCEDURE — 94060 EVALUATION OF WHEEZING: CPT | Performed by: INTERNAL MEDICINE

## 2021-06-18 RX ORDER — ALBUTEROL SULFATE 2.5 MG/3ML
2.5 SOLUTION RESPIRATORY (INHALATION) ONCE
Status: COMPLETED | OUTPATIENT
Start: 2021-06-18 | End: 2021-06-18

## 2021-06-18 RX ADMIN — ALBUTEROL SULFATE 2.5 MG: 2.5 SOLUTION RESPIRATORY (INHALATION) at 13:13

## 2021-06-30 ENCOUNTER — TRANSCRIBE ORDERS (OUTPATIENT)
Dept: LAB | Facility: HOSPITAL | Age: 66
End: 2021-06-30

## 2021-06-30 DIAGNOSIS — Z11.59 SCREENING FOR VIRAL DISEASE: Primary | ICD-10-CM

## 2021-07-06 ENCOUNTER — OFFICE VISIT (OUTPATIENT)
Dept: CARDIOLOGY | Facility: CLINIC | Age: 66
End: 2021-07-06

## 2021-07-06 VITALS
BODY MASS INDEX: 26.06 KG/M2 | HEART RATE: 99 BPM | DIASTOLIC BLOOD PRESSURE: 75 MMHG | SYSTOLIC BLOOD PRESSURE: 118 MMHG | HEIGHT: 67 IN | OXYGEN SATURATION: 98 % | WEIGHT: 166 LBS

## 2021-07-06 DIAGNOSIS — I50.42 CHRONIC COMBINED SYSTOLIC AND DIASTOLIC CONGESTIVE HEART FAILURE (HCC): Chronic | ICD-10-CM

## 2021-07-06 DIAGNOSIS — I48.19 PERSISTENT ATRIAL FIBRILLATION (HCC): Primary | Chronic | ICD-10-CM

## 2021-07-06 DIAGNOSIS — I42.0 DILATED CARDIOMYOPATHY (HCC): Chronic | ICD-10-CM

## 2021-07-06 DIAGNOSIS — Z79.01 CHRONIC ANTICOAGULATION: Chronic | ICD-10-CM

## 2021-07-06 PROCEDURE — 99214 OFFICE O/P EST MOD 30 MIN: CPT | Performed by: NURSE PRACTITIONER

## 2021-07-06 PROCEDURE — 93000 ELECTROCARDIOGRAM COMPLETE: CPT | Performed by: NURSE PRACTITIONER

## 2021-07-06 RX ORDER — AMOXICILLIN 500 MG/1
500 CAPSULE ORAL 2 TIMES DAILY
COMMUNITY
End: 2021-11-10

## 2021-07-06 NOTE — PROGRESS NOTES
Subjective:     Encounter Date:07/06/2021      Patient ID: Ryan Sanchez is a 65 y.o. male with a previous history of paroxysmal atrial fibrillation, long term anticoagulation, chronic systolic congestive heart failure, cardiomyopathy, diabetes mellitus, and previous EtOH abuse who presents today for follow up of CHF/AFL.    Chief Complaint: Follow up   Atrial Fibrillation  Presents for follow-up visit. Symptoms include chest pain, dizziness, palpitations, shortness of breath and tachycardia. Symptoms are negative for bradycardia, hemodynamic instability, hypertension, hypotension and syncope. (+Weight gain ) The symptoms have been stable. Past medical history includes atrial fibrillation and CHF. There are no medication compliance problems.   Congestive Heart Failure  Presents for follow-up visit. Associated symptoms include chest pain, palpitations, shortness of breath and unexpected weight change. Pertinent negatives include no chest pressure, edema, fatigue, near-syncope, orthopnea or paroxysmal nocturnal dyspnea. The symptoms have been stable. The pain is mild. The quality of the pain is described as tightness. The pain does not radiate. Chest pain occurs with exertion. Compliance with total regimen is %. Compliance with diet is %. Compliance with exercise is %. Compliance with medications is %.     Mr. Sanchez was first seen by our service in consultation in December 2020 for new findings of atrial fibrillation with RVR.  He had presented to the hospital with complaints of shortness of breath and found to be in atrial fibrillation, chronicity unknown at that time.  He had reported at least a month of symptoms including shortness of breath, sharp left-sided chest pain.  During that hospital evaluation the patient had echocardiogram and was found to have a reduced left ventricular systolic function.  His EF was estimated to be 40 to 45% at that time.  Mild mitral regurgitation was  "noted.  During that hospitalization the patient was started on oral anticoagulation, ACE inhibitor, loop diuretic, beta-blocker for management of his systolic heart failure, and medication adjustments for improvement of his heart rates.  During his hospitalization a CT angiogram was ordered of his chest for evaluation of his complaints of shortness of breath.  This revealed a left atrial appendage thrombus.  It was recommended that the patient follow-up after discharge with cardiology, however it does not appear that an appointment was made when he was discharged from the hospital.      Subsequently, the patient presented back to the hospital in March with complaints of shortness of breath and palpitations.  He was evaluated in the emergency department and admitted for further evaluation and care.  The ER note mentions that the patient had been \"converted\" however it appears he was discharged in December in atrial fibrillation without follow-up in between.  It is likely the patient has been in persistent A. fib since that time.  During the hospitalization in March cardiology was consulted due to atrial fibrillation.  The patient had reported his heart pounding and heart palpitations.  He reported easy fatigability and shortness of breath.  He denied chest pain at that time as well.  He had previously been a heavy alcohol drinker and reported cessation of alcohol use but endorsed drinking a significant amount of coffee each day.  When he presented he was started on IV amiodarone due to rapid ventricular response on presentation.  He was converted to oral amiodarone at discharge.  His diltiazem was discontinued.  His Xarelto was continued for anticoagulation use and he was continued on his lisinopril and Toprol-XL at discharge.  He was using furosemide as needed.    He presented to the office for follow-up 4/16/21.  He was in atrial fibrillation on EKG his heart rate was 99.  He reported fatigue.  His shortness of " breath was improved.  He does have episodes of shortness of breath generally related to elevated heart rates.  He reported improvement of his heart rates and shortness of breath after taking an extra dose of his metoprolol at home on a couple occasions since he has been discharged.  He was on anticoagulation with Xarelto, but had not been taking this with his largest meal.  A repeat echocardiogram was obtained during his hospitalization in March.  His left ventricular systolic function has reduced since December (40-45% down to 30-35%).   It is conceivable that this is due to poor rate control with atrial fibrillation.  He has moderate mitral regurgitation noted on his most recent echo that had previously been noted to be mild.  This is likely secondary due to his worsening LVEF.  He was mildly volume overloaded during his most recent hospitalization and received some IV diuretics during that time as well.    He was transitioned from lisinopril to Entresto. His beta blocker dose was increased and he was instructed to monitor weights, and symptoms, and use lasix as needed. He was instructed to change Xarelto to taking with his largest meal. He presented on 4/29 after initiating Entresto and was doing well from a CHF standpoint. He was still having some fatigue at that time. He was scheduled for outpatient cardioversion.     He was cardioverted successfully with CHANTEL/Cardioversion on 5/5/21.  He felt improved after discharge. He was doing well and noticed and increase in heart rate with slow weight gain within one month afterwards.  He has been taking his Entresto and Toprol as scheduled.  He was found to be back in AF on EKG. He was referred to EP - appointment this month.  He has been on amio and has repeat dccv scheduled for this month. This was not done sooner as he had held his anticoagulation for root canal.  His dental work was completed 6/14/21.      He currently denies any orthopnea, PND.  He reports  intermittent lower extremity swelling, chest pain, and dyspnea with extreme exertion, all improved with rest. He has some fluttering in his chest at times (worse with activity) and has reported a weight gain of approximately 10 pounds in the past couple months and fatigue.  He has also noted some lightheadedness in the past 3 weeks, which is intermittent.  He has Lasix as needed at home.  He is taking his Xarelto as prescribed and denies any bleeding.  He reports that he tires easily.  His symptoms that he complains of currently, had previously improved after cardioversion.      The following portions of the patient's history were reviewed and updated as appropriate: allergies, current medications, past family history, past medical history, past social history and past surgical history.     No Known Allergies      Current Outpatient Medications:   •  acetaminophen (TYLENOL) 325 MG tablet, Take 2 tablets by mouth Every 4 (Four) Hours As Needed for Mild Pain ., Disp: 30 tablet, Rfl: 0  •  amiodarone (PACERONE) 200 MG tablet, Take 1 tablet by mouth 2 (Two) Times a Day., Disp: 60 tablet, Rfl: 3  •  amoxicillin (AMOXIL) 500 MG capsule, Take 500 mg by mouth 2 (Two) Times a Day., Disp: , Rfl:   •  furosemide (Lasix) 40 MG tablet, Take 1 tablet by mouth Daily As Needed (Weight gain, shortness of breath, swelling). (Patient taking differently: Take 40 mg by mouth Daily As Needed (Weight gain, shortness of breath, swelling). Takes 3 times a week), Disp: 30 tablet, Rfl: 3  •  loratadine (CLARITIN) 10 MG tablet, Take 10 mg by mouth As Needed., Disp: , Rfl:   •  metFORMIN (GLUCOPHAGE) 500 MG tablet, Take 500 mg by mouth Daily. Pt takes one tablet daily, Disp: , Rfl:   •  metoprolol succinate XL (TOPROL-XL) 50 MG 24 hr tablet, Take 1 tablet by mouth Daily., Disp: 30 tablet, Rfl: 11  •  rivaroxaban (XARELTO) 20 MG tablet, Take 20 mg by mouth Daily., Disp: , Rfl:   •  sacubitril-valsartan (ENTRESTO) 24-26 MG tablet, Take 1 tablet  "by mouth 2 (Two) Times a Day., Disp: 60 tablet, Rfl: 11      Review of Systems   Constitutional: Positive for unexpected weight change. Negative for chills, fatigue and weight gain.   Cardiovascular: Positive for chest pain and palpitations. Negative for dyspnea on exertion, near-syncope and syncope.   Respiratory: Positive for shortness of breath.    Hematologic/Lymphatic: Negative for bleeding problem. Bruises/bleeds easily.   Neurological: Positive for dizziness. Negative for light-headedness.   Psychiatric/Behavioral: Negative for altered mental status.         ECG 12 Lead    Date/Time: 7/6/2021 10:06 AM  Performed by: Kortney Valentino APRN  Authorized by: Kortney Valentino APRN   Comparison: compared with previous ECG from 6/7/2021  Similar to previous ECG  Comparison to previous ECG: Heart rate has decreased by 37 bpm  Rhythm: atrial fibrillation  Rate: normal  BPM: 99  Conduction: conduction normal  Other findings: T wave abnormality    Clinical impression: abnormal EKG             /75   Pulse 99   Ht 170.2 cm (67\")   Wt 75.3 kg (166 lb)   SpO2 98%   BMI 26.00 kg/m²        Objective:     Vitals reviewed.   Constitutional:       Appearance: Well-developed, well-groomed and not in distress. Chronically ill-appearing.   HENT:      Head: Normocephalic and atraumatic.   Pulmonary:      Effort: Pulmonary effort is normal.      Breath sounds: Normal breath sounds.   Cardiovascular:      Normal rate. Irregularly irregular rhythm.      Murmurs: There is no murmur.      No gallop. No rub.   Edema:     Peripheral edema absent.   Abdominal:      General: Bowel sounds are normal.   Musculoskeletal:      Cervical back: Normal range of motion and neck supple. Skin:     General: Skin is warm and dry.   Neurological:      Mental Status: Alert, oriented to person, place, and time and oriented to person, place and time.   Psychiatric:         Mood and Affect: Mood and affect normal.         Behavior: Behavior is " cooperative.       Lab Review:    Results for orders placed during the hospital encounter of 05/05/21    Adult Transesophageal Echo (CHANTLE) W/ Cont if Necessary Per Protocol    Interpretation Summary  · Left ventricular systolic function is mildly decreased. Left ventricular ejection fraction appears to be 46 - 50%.  · No evidence of a left atrial appendage thrombus.  · Trace to mild mitral valve regurgitation is present.    CT angiogram chest: 12/26/2020-impression relates left atrial appendage thrombus.      Assessment:          Diagnosis Plan   1. Persistent atrial fibrillation (CMS/HCC)     2. Chronic combined systolic and diastolic congestive heart failure (CMS/HCC)     3. Dilated cardiomyopathy (CMS/HCC)     4. Chronic anticoagulation            Plan:       -Persistent atrial fibrillation: The patient remains in atrial fibrillation.  His rates have improved since his previous visit.  He is anticoagulated with Xarelto.  He has a scheduled cardioversion planned for next week.  He has his appointment to establish care with electrophysiology next week as well.  He is to continue anticoagulation without any missed doses.  He verbalizes understanding.  Continue amiodarone 200 mg twice daily.  He is symptomatic to elevated heart rates.  He reports shortness of breath and chest pain improved with lesser activity or rest.  Monitor symptoms after cardioversion.  If rhythm remains stable and symptoms persist consider ischemic evaluation.    -Chronic combined systolic and diastolic congestive heart failure:   Initially when he was hospitalized, his EF was 40 to 45% (December 2020) and then dropped to 30 to 35% (March 2021).  He was transitioned from Lisinopril to Entresto. His BMP and blood pressure remained stable.  His Toprol XL was increased from 25 mg to 50 mg daily.  He has lasix at home for PRN.  His follow-up CHANTEL, prior to his cardioversion in May 2021, revealed improvement of his LVEF to 45 to 50%.  Continue current  GDMT for management of CHF.  His Lasix has not been required.  Continue to use Lasix as needed.  Monitor weights at home.    -Dilated cardiomyopathy: He has a mildly dilated left ventricle noted on echocardiogram.  He had a reduced LVEF of 30-35% on echocardiogram which was performed in March 2021.  He recently required transesophageal echocardiogram prior to his cardioversion.  LVEF, at that time, was estimated to be 45-50%. He is on guideline directed medical therapy due to his previous left ventricular systolic dysfunction.  He is to continue medications.    -Chronic anticoagulation: Initiated in December 2020.  Continue uninterrupted use of Xarelto for stroke risk reduction in the setting of atrial fibrillation.    Follow-up with planned cardioversion.  Follow-up with electrophysiology as scheduled.

## 2021-07-10 ENCOUNTER — LAB (OUTPATIENT)
Dept: LAB | Facility: HOSPITAL | Age: 66
End: 2021-07-10

## 2021-07-10 LAB — SARS-COV-2 ORF1AB RESP QL NAA+PROBE: NOT DETECTED

## 2021-07-10 PROCEDURE — U0004 COV-19 TEST NON-CDC HGH THRU: HCPCS | Performed by: NURSE PRACTITIONER

## 2021-07-10 PROCEDURE — C9803 HOPD COVID-19 SPEC COLLECT: HCPCS | Performed by: NURSE PRACTITIONER

## 2021-07-13 ENCOUNTER — HOSPITAL ENCOUNTER (OUTPATIENT)
Dept: CARDIOLOGY | Facility: HOSPITAL | Age: 66
Discharge: HOME | End: 2021-07-13

## 2021-07-13 ENCOUNTER — ANESTHESIA (OUTPATIENT)
Dept: CARDIOLOGY | Facility: HOSPITAL | Age: 66
End: 2021-07-13

## 2021-07-13 ENCOUNTER — ANESTHESIA EVENT (OUTPATIENT)
Dept: CARDIOLOGY | Facility: HOSPITAL | Age: 66
End: 2021-07-13

## 2021-07-13 VITALS
DIASTOLIC BLOOD PRESSURE: 85 MMHG | WEIGHT: 165.4 LBS | HEIGHT: 67 IN | TEMPERATURE: 96.6 F | SYSTOLIC BLOOD PRESSURE: 98 MMHG | HEART RATE: 63 BPM | RESPIRATION RATE: 14 BRPM | BODY MASS INDEX: 25.96 KG/M2 | OXYGEN SATURATION: 100 %

## 2021-07-13 DIAGNOSIS — I48.19 PERSISTENT ATRIAL FIBRILLATION (HCC): ICD-10-CM

## 2021-07-13 LAB
MAXIMAL PREDICTED HEART RATE: 154 BPM
STRESS TARGET HR: 131 BPM

## 2021-07-13 PROCEDURE — 93005 ELECTROCARDIOGRAM TRACING: CPT | Performed by: NURSE PRACTITIONER

## 2021-07-13 PROCEDURE — 93010 ELECTROCARDIOGRAM REPORT: CPT | Performed by: INTERNAL MEDICINE

## 2021-07-13 PROCEDURE — 92960 CARDIOVERSION ELECTRIC EXT: CPT | Performed by: INTERNAL MEDICINE

## 2021-07-13 PROCEDURE — 25010000002 PROPOFOL 10 MG/ML EMULSION: Performed by: NURSE ANESTHETIST, CERTIFIED REGISTERED

## 2021-07-13 PROCEDURE — 92960 CARDIOVERSION ELECTRIC EXT: CPT

## 2021-07-13 RX ORDER — LIDOCAINE HYDROCHLORIDE 20 MG/ML
INJECTION, SOLUTION EPIDURAL; INFILTRATION; INTRACAUDAL; PERINEURAL AS NEEDED
Status: DISCONTINUED | OUTPATIENT
Start: 2021-07-13 | End: 2021-07-13 | Stop reason: SURG

## 2021-07-13 RX ORDER — SODIUM CHLORIDE 0.9 % (FLUSH) 0.9 %
10 SYRINGE (ML) INJECTION AS NEEDED
Status: DISCONTINUED | OUTPATIENT
Start: 2021-07-13 | End: 2021-07-14 | Stop reason: HOSPADM

## 2021-07-13 RX ORDER — SODIUM CHLORIDE 9 MG/ML
INJECTION, SOLUTION INTRAVENOUS CONTINUOUS PRN
Status: DISCONTINUED | OUTPATIENT
Start: 2021-07-13 | End: 2021-07-13 | Stop reason: SURG

## 2021-07-13 RX ORDER — PROPOFOL 10 MG/ML
VIAL (ML) INTRAVENOUS AS NEEDED
Status: DISCONTINUED | OUTPATIENT
Start: 2021-07-13 | End: 2021-07-13 | Stop reason: SURG

## 2021-07-13 RX ORDER — SODIUM CHLORIDE 0.9 % (FLUSH) 0.9 %
10 SYRINGE (ML) INJECTION EVERY 12 HOURS SCHEDULED
Status: DISCONTINUED | OUTPATIENT
Start: 2021-07-13 | End: 2021-07-14 | Stop reason: HOSPADM

## 2021-07-13 RX ADMIN — PROPOFOL 70 MG: 10 INJECTION, EMULSION INTRAVENOUS at 08:20

## 2021-07-13 RX ADMIN — LIDOCAINE HYDROCHLORIDE 100 MG: 20 INJECTION, SOLUTION EPIDURAL; INFILTRATION; INTRACAUDAL; PERINEURAL at 08:20

## 2021-07-13 RX ADMIN — SODIUM CHLORIDE, PRESERVATIVE FREE 10 ML: 5 INJECTION INTRAVENOUS at 07:49

## 2021-07-13 RX ADMIN — SODIUM CHLORIDE: 9 INJECTION, SOLUTION INTRAVENOUS at 08:11

## 2021-07-13 NOTE — ANESTHESIA POSTPROCEDURE EVALUATION
"Patient: Ryan Sanchez    Procedure Summary     Date: 07/13/21 Room / Location: [facility] CATH LAB    Anesthesia Start: 0818 Anesthesia Stop: 0828    Procedure: CARDIOVERSION EXTERNAL IN CARDIOLOGY DEPARTMENT Diagnosis:       Persistent atrial fibrillation (CMS/HCC)      (persistent AF)    Scheduled Providers:  Provider: Clem Meza CRNA    Anesthesia Type: MAC ASA Status: 3          Anesthesia Type: MAC    Vitals  No vitals data found for the desired time range.          Post Anesthesia Care and Evaluation    Patient location during evaluation: PHASE II  Patient participation: complete - patient participated  Level of consciousness: awake and alert  Pain management: adequate  Airway patency: patent  Anesthetic complications: No anesthetic complications    Cardiovascular status: acceptable  Respiratory status: acceptable  Hydration status: acceptable    Comments: Blood pressure 102/76, pulse 111, temperature 96.6 °F (35.9 °C), temperature source Temporal, resp. rate 18, height 170.2 cm (67\"), weight 75 kg (165 lb 6.4 oz), SpO2 98 %.    Pt discharged from PACU based on gordy score >8      "

## 2021-07-13 NOTE — ANESTHESIA PREPROCEDURE EVALUATION
Anesthesia Evaluation     history of anesthetic complications: PONV  NPO Solid Status: > 8 hours  NPO Liquid Status: > 8 hours           Airway   Mallampati: II  No difficulty expected  Dental - normal exam     Pulmonary - normal exam   (-) asthma, recent URI, sleep apnea, not a smoker  Cardiovascular   Exercise tolerance: excellent (>7 METS)    ECG reviewed  PT is on anticoagulation therapy  Rhythm: irregular    (+) hypertension well controlled, valvular problems/murmurs (diagnosed 20 years ago, no issues) MVP, dysrhythmias Atrial Fib, CHF Diastolic >=55% and Systolic <55%,   (-) pacemaker, past MI, angina, cardiac stents    ROS comment: Prior history of left atrial thrombus    Echo:  · Left ventricular ejection fraction appears to be 31 - 35%. Left ventricular systolic function is moderately decreased.  · Left ventricular diastolic function is consistent with (grade II w/high LAP) pseudonormalization.  · The left atrial cavity is mildly dilated.  · Mitral annular calcification is present. Moderate mitral valve regurgitation is present. No significant mitral valve stenosis is present.  · No evidence of pulmonary hypertension is present.    Neuro/Psych  (-) seizures, TIA, CVA  GI/Hepatic/Renal/Endo    (+)  GERD well controlled,  diabetes mellitus,   (-) liver disease, no renal disease    Musculoskeletal     Abdominal  - normal exam   Substance History      OB/GYN          Other                          Anesthesia Plan    ASA 3     MAC   (My preoperative assessment completed via chart review. CRNA to complete airway assessment and review medical history)  intravenous induction     Anesthetic plan, all risks, benefits, and alternatives have been provided, discussed and informed consent has been obtained with: patient.

## 2021-07-14 LAB
QT INTERVAL: 388 MS
QT INTERVAL: 468 MS
QTC INTERVAL: 443 MS
QTC INTERVAL: 464 MS

## 2021-09-17 DIAGNOSIS — Z87.442 HISTORY OF KIDNEY STONES: Primary | ICD-10-CM

## 2021-10-01 ENCOUNTER — OFFICE VISIT (OUTPATIENT)
Dept: UROLOGY | Facility: CLINIC | Age: 66
End: 2021-10-01

## 2021-10-01 ENCOUNTER — HOSPITAL ENCOUNTER (OUTPATIENT)
Dept: GENERAL RADIOLOGY | Facility: HOSPITAL | Age: 66
Discharge: HOME | End: 2021-10-01
Admitting: PHYSICIAN ASSISTANT

## 2021-10-01 VITALS — BODY MASS INDEX: 26.53 KG/M2 | WEIGHT: 169 LBS | HEIGHT: 67 IN | TEMPERATURE: 98.3 F

## 2021-10-01 DIAGNOSIS — N39.43 BENIGN PROSTATIC HYPERPLASIA WITH POST-VOID DRIBBLING: ICD-10-CM

## 2021-10-01 DIAGNOSIS — N39.0 URINARY TRACT INFECTION WITH HEMATURIA, SITE UNSPECIFIED: Primary | ICD-10-CM

## 2021-10-01 DIAGNOSIS — Z87.442 HISTORY OF KIDNEY STONES: ICD-10-CM

## 2021-10-01 DIAGNOSIS — R31.9 URINARY TRACT INFECTION WITH HEMATURIA, SITE UNSPECIFIED: Primary | ICD-10-CM

## 2021-10-01 DIAGNOSIS — N40.1 BENIGN PROSTATIC HYPERPLASIA WITH POST-VOID DRIBBLING: ICD-10-CM

## 2021-10-01 LAB
BILIRUB BLD-MCNC: NEGATIVE MG/DL
CLARITY, POC: CLEAR
COLOR UR: YELLOW
GLUCOSE UR STRIP-MCNC: NEGATIVE MG/DL
KETONES UR QL: NEGATIVE
LEUKOCYTE EST, POC: ABNORMAL
NITRITE UR-MCNC: NEGATIVE MG/ML
PH UR: 5.5 [PH] (ref 5–8)
PROT UR STRIP-MCNC: NEGATIVE MG/DL
RBC # UR STRIP: NEGATIVE /UL
SP GR UR: 1.01 (ref 1–1.03)
UROBILINOGEN UR QL: NORMAL

## 2021-10-01 PROCEDURE — 74018 RADEX ABDOMEN 1 VIEW: CPT

## 2021-10-01 PROCEDURE — 51798 US URINE CAPACITY MEASURE: CPT | Performed by: PHYSICIAN ASSISTANT

## 2021-10-01 PROCEDURE — 81001 URINALYSIS AUTO W/SCOPE: CPT | Performed by: PHYSICIAN ASSISTANT

## 2021-10-01 PROCEDURE — 99202 OFFICE O/P NEW SF 15 MIN: CPT | Performed by: PHYSICIAN ASSISTANT

## 2021-10-01 RX ORDER — CELECOXIB 200 MG/1
200 CAPSULE ORAL AS NEEDED
COMMUNITY

## 2021-11-01 RX ORDER — RIVAROXABAN 20 MG/1
TABLET, FILM COATED ORAL
Qty: 30 TABLET | Refills: 1 | OUTPATIENT
Start: 2021-11-01

## 2021-11-02 ENCOUNTER — TELEPHONE (OUTPATIENT)
Dept: GASTROENTEROLOGY | Facility: CLINIC | Age: 66
End: 2021-11-02

## 2021-11-02 NOTE — TELEPHONE ENCOUNTER
Talked to [facility] [phone] about patients xarelto which had dr. Selby as pre scriber.we did not prescribe.rx was transferred from another pharmacy it is straightened out now.

## 2021-11-05 ENCOUNTER — TELEPHONE (OUTPATIENT)
Dept: CARDIOLOGY | Facility: CLINIC | Age: 66
End: 2021-11-05

## 2021-11-05 NOTE — TELEPHONE ENCOUNTER
"Mr Sanchez called a few days ago leaving a voicemail that Dr Monteiro mentioned to him that our office could schedule him another cardioversion before his ablation. I requested office note from Dr Monteiro's office which I received and it did not mention another cardioversion. I have left his nurse a voicemail asking her to call me back to see what Dr Monteiro is wanting.   I called Mr Sanchez to see how he was doing and he stated he has been having\"sharp pain\" in his side and some pressure. I explained to him that he would need to go to the ER if he is having CP because that would be possibly something else going on besides his AFIB.Patient said he would probably hold off but understood.    He has an ablation set up for December 2nd    Dr Monteiro nurse called back and will ask Dr Monteiro about this on Monday.    I have discussed this with Kortney CHARLES who is agreeable with ordering another cardioversion on Mr Sanchez if needed.    Dr Monteiro's nurse talked with Dr Monteiro and he said it was not necessary to do a 3rd cardioversion before his ablation.  "

## 2021-11-10 ENCOUNTER — OFFICE VISIT (OUTPATIENT)
Dept: CARDIOLOGY | Facility: CLINIC | Age: 66
End: 2021-11-10

## 2021-11-10 VITALS
SYSTOLIC BLOOD PRESSURE: 140 MMHG | HEIGHT: 67 IN | WEIGHT: 172 LBS | BODY MASS INDEX: 27 KG/M2 | HEART RATE: 125 BPM | OXYGEN SATURATION: 95 % | DIASTOLIC BLOOD PRESSURE: 80 MMHG

## 2021-11-10 DIAGNOSIS — I48.19 PERSISTENT ATRIAL FIBRILLATION (HCC): Primary | Chronic | ICD-10-CM

## 2021-11-10 DIAGNOSIS — I42.0 DILATED CARDIOMYOPATHY (HCC): Chronic | ICD-10-CM

## 2021-11-10 DIAGNOSIS — Z79.01 CHRONIC ANTICOAGULATION: Chronic | ICD-10-CM

## 2021-11-10 DIAGNOSIS — I50.42 CHRONIC COMBINED SYSTOLIC AND DIASTOLIC CONGESTIVE HEART FAILURE (HCC): Chronic | ICD-10-CM

## 2021-11-10 PROCEDURE — 93000 ELECTROCARDIOGRAM COMPLETE: CPT | Performed by: NURSE PRACTITIONER

## 2021-11-10 PROCEDURE — 99214 OFFICE O/P EST MOD 30 MIN: CPT | Performed by: NURSE PRACTITIONER

## 2021-11-10 RX ORDER — METOPROLOL SUCCINATE 50 MG/1
100 TABLET, EXTENDED RELEASE ORAL
Qty: 60 TABLET | Refills: 11 | Status: SHIPPED | OUTPATIENT
Start: 2021-11-10 | End: 2022-08-17

## 2021-11-10 NOTE — PROGRESS NOTES
Subjective:     Encounter Date:11/10/2021      Patient ID: Rayn Sanchez is a 66 y.o. male with known paroxysmal atrial fibrillation, long term anticoagulation, chronic systolic congestive heart failure, cardiomyopathy, diabetes mellitus, and previous EtOH abuse who presents today for follow up of CHF and AF.  He was recently evaluated by electrophysiology and has a scheduled ablation next month.    Chief Complaint: AF Follow up   Atrial Fibrillation  Presents for follow-up visit. Symptoms include chest pain, shortness of breath and tachycardia. Symptoms are negative for bradycardia, dizziness, hemodynamic instability, hypotension, palpitations and syncope. (+Weight gain ) The symptoms have been stable. Past medical history includes atrial fibrillation. There are no medication compliance problems.   Congestive Heart Failure  Presents for follow-up visit. Associated symptoms include chest pain, shortness of breath and unexpected weight change. Pertinent negatives include no edema, near-syncope, orthopnea, palpitations or paroxysmal nocturnal dyspnea. The symptoms have been stable. The pain is mild. The quality of the pain is described as tightness. The pain does not radiate. Chest pain occurs with exertion. Compliance with total regimen is %. Compliance with diet is %. Compliance with exercise is %. Compliance with medications is %.     Mr. Sanchez was first seen by our service in consultation in December 2020 for new findings of atrial fibrillation with RVR.  He had presented to the hospital with complaints of shortness of breath and found to be in atrial fibrillation, chronicity unknown at that time.  He had reported at least a month of symptoms including shortness of breath, sharp left-sided chest pain.  During that hospital evaluation the patient had echocardiogram and was found to have a reduced left ventricular systolic function.  His EF was estimated to be 40 to 45% at that time.   "Mild mitral regurgitation was noted.  During that hospitalization the patient was started on oral anticoagulation, ACE inhibitor, loop diuretic, beta-blocker for management of his systolic heart failure, and medication adjustments for improvement of his heart rates.  During his hospitalization a CT angiogram was ordered of his chest for evaluation of his complaints of shortness of breath.  This revealed a left atrial appendage thrombus.  It was recommended that the patient follow-up after discharge with cardiology, however it does not appear that an appointment was made when he was discharged from the hospital.      Subsequently, the patient presented back to the hospital in March with complaints of shortness of breath and palpitations.  He was evaluated in the emergency department and admitted for further evaluation and care.  The ER note mentions that the patient had been \"converted\" however it appears he was discharged in December in atrial fibrillation without follow-up in between.  It is likely the patient has been in persistent A. fib since that time.  During the hospitalization in March cardiology was consulted due to atrial fibrillation.  The patient had reported his heart pounding and heart palpitations.  He reported easy fatigability and shortness of breath.  He denied chest pain at that time as well.  He had previously been a heavy alcohol drinker and reported cessation of alcohol use but endorsed drinking a significant amount of coffee each day.  When he presented he was started on IV amiodarone due to rapid ventricular response on presentation.  He was converted to oral amiodarone at discharge.  His diltiazem was discontinued.  His Xarelto was continued for anticoagulation use and he was continued on his lisinopril and Toprol-XL at discharge.  He was using furosemide as needed.    He presented to the office for follow-up 4/16/21.  He was in atrial fibrillation on EKG his heart rate was 99.  He reported " fatigue.  His shortness of breath was improved.  He does have episodes of shortness of breath generally related to elevated heart rates.  He reported improvement of his heart rates and shortness of breath after taking an extra dose of his metoprolol at home on a couple occasions since he has been discharged.  He was on anticoagulation with Xarelto, but had not been taking this with his largest meal.  A repeat echocardiogram was obtained during his hospitalization in March.  His left ventricular systolic function has reduced since December (40-45% down to 30-35%).   It is conceivable that this is due to poor rate control with atrial fibrillation.  He has moderate mitral regurgitation noted on his most recent echo that had previously been noted to be mild.  This is likely secondary due to his worsening LVEF.  He was mildly volume overloaded during his most recent hospitalization and received some IV diuretics during that time as well.    He was transitioned from lisinopril to Entresto. His beta blocker dose was increased and he was instructed to monitor weights, and symptoms, and use lasix as needed. He was instructed to change Xarelto to taking with his largest meal. He presented on 4/29 after initiating Entresto and was doing well from a CHF standpoint. He was still having some fatigue at that time. He was scheduled for outpatient cardioversion.     He was cardioverted successfully with CHANTEL/Cardioversion on 5/5/21.  He felt improved after discharge. He was doing well and noticed and increase in heart rate with slow weight gain within one month afterwards.  He was taking his Entresto and Toprol as scheduled.  He was found to be back in AF on EKG at follow up. He was referred to EP.  He has been on amio and had repeat dccv in July. This was not done sooner as he had held his anticoagulation for root canal.  His dental work was completed 6/14/21.  Ultimately his cardioversion was completed in July, successful, with  recurrent atrial arrhythmia shortly thereafter.    Mr. Sanchez recently called our office after being seen by Dr. Derrick Monteiro, electrophysiologist, to discuss cardioversion with our office.  The patient mentioned that Dr. Monteiro had suggested that he may require cardioversion for rhythm management prior to his ablation if he continues to be significantly symptomatic while awaiting his scheduled procedure in the next few weeks.  The patient reported some shortness of breath and chest pain as well as use of as needed Lasix at home and was placed on our schedule for an evaluation to determine appropriate management of his current complaints.    He presents to the office today in atrial fibrillation with rates around 120 bpm.  He tells me that he is not feeling well today but received his COVID booster vaccination yesterday and had a fever through the night as well as body aches that have continued throughout today.  He has not had any medications this morning.  He denies significant symptoms of congestive heart failure including no orthopnea or PND.  He has used intermittent dosing of Lasix which has made him feel better in terms of his shortness of breath.  Symptoms are worse with higher heart rates including shortness of breath and chest pain at times.  He reports his heart rates have been anywhere from  at home.  He does monitor his blood pressure and heart rate daily.  He is compliant with his medications but reports missing his entire dosing at least once a week on average.  He is on medications for management of his rhythm which have been unsuccessful as well as management of his congestive heart failure.  He has previously had cardioversions on 2 attempts which have been successful initially however the patient has had recurrent symptoms within a short period of time.  His most recent cardioversion was in July with return of symptoms approximately 10 days later.        The following portions of the  patient's history were reviewed and updated as appropriate: allergies, current medications, past family history, past medical history, past social history and past surgical history.     No Known Allergies      Current Outpatient Medications:   •  acetaminophen (TYLENOL) 325 MG tablet, Take 2 tablets by mouth Every 4 (Four) Hours As Needed for Mild Pain ., Disp: 30 tablet, Rfl: 0  •  amiodarone (PACERONE) 200 MG tablet, Take 1 tablet by mouth 2 (Two) Times a Day., Disp: 60 tablet, Rfl: 3  •  celecoxib (CeleBREX) 200 MG capsule, Take 200 mg by mouth 2 (Two) Times a Day., Disp: , Rfl:   •  furosemide (Lasix) 40 MG tablet, Take 1 tablet by mouth Daily As Needed (Weight gain, shortness of breath, swelling). (Patient taking differently: Take 40 mg by mouth Daily As Needed (Weight gain, shortness of breath, swelling). Takes 2 times a week), Disp: 30 tablet, Rfl: 3  •  loratadine (CLARITIN) 10 MG tablet, Take 10 mg by mouth As Needed., Disp: , Rfl:   •  metoprolol succinate XL (TOPROL-XL) 50 MG 24 hr tablet, Take 2 tablets by mouth Daily., Disp: 60 tablet, Rfl: 11  •  rivaroxaban (XARELTO) 20 MG tablet, Take 20 mg by mouth Daily., Disp: , Rfl:   •  sacubitril-valsartan (ENTRESTO) 24-26 MG tablet, Take 1 tablet by mouth 2 (Two) Times a Day., Disp: 60 tablet, Rfl: 11  •  metFORMIN (GLUCOPHAGE) 500 MG tablet, Take 500 mg by mouth Daily. Pt takes one tablet daily, Disp: , Rfl:       Review of Systems   Constitutional: Positive for fever, malaise/fatigue and unexpected weight change. Negative for weight gain.   Cardiovascular: Positive for chest pain. Negative for dyspnea on exertion, leg swelling, near-syncope, orthopnea, palpitations, paroxysmal nocturnal dyspnea and syncope.   Respiratory: Positive for shortness of breath. Negative for wheezing.    Hematologic/Lymphatic: Negative for bleeding problem. Bruises/bleeds easily.   Musculoskeletal: Positive for joint pain.   Gastrointestinal: Negative for nausea and vomiting.  "  Neurological: Negative for dizziness, focal weakness and light-headedness.   Psychiatric/Behavioral: Negative for altered mental status.         ECG 12 Lead    Date/Time: 11/10/2021 4:28 PM  Performed by: Kortney Valetnino APRN  Authorized by: Kortney Valentino APRN   Rhythm: atrial fibrillation  Rate: tachycardic  BPM: 125  Conduction: conduction normal  QRS axis: left    Clinical impression: abnormal EKG             /80   Pulse (!) 125   Ht 170.2 cm (67\")   Wt 78 kg (172 lb)   SpO2 95%   BMI 26.94 kg/m²        Objective:     Vitals reviewed.   Constitutional:       Appearance: Well-developed, well-groomed and not in distress. Chronically ill-appearing.   HENT:      Head: Normocephalic and atraumatic.   Pulmonary:      Effort: Pulmonary effort is normal.      Breath sounds: Normal breath sounds.   Cardiovascular:      Tachycardia present. Irregularly irregular rhythm.      Murmurs: There is no murmur.      No gallop. No rub.   Edema:     Peripheral edema absent.   Abdominal:      General: Bowel sounds are normal.   Musculoskeletal:      Cervical back: Normal range of motion and neck supple. Skin:     General: Skin is warm and dry.   Neurological:      Mental Status: Alert, oriented to person, place, and time and oriented to person, place and time.   Psychiatric:         Mood and Affect: Mood and affect normal.         Behavior: Behavior is cooperative.       Lab Review:    Results for orders placed during the hospital encounter of 05/05/21    Adult Transesophageal Echo (CHANTEL) W/ Cont if Necessary Per Protocol    Interpretation Summary  · Left ventricular systolic function is mildly decreased. Left ventricular ejection fraction appears to be 46 - 50%.  · No evidence of a left atrial appendage thrombus.  · Trace to mild mitral valve regurgitation is present.    CT angiogram chest: 12/26/2020-impression relates left atrial appendage thrombus.      Assessment:          Diagnosis Plan   1. Persistent atrial " fibrillation (HCC)     2. Chronic combined systolic and diastolic congestive heart failure (HCC)     3. Dilated cardiomyopathy (HCC)     4. Chronic anticoagulation            Plan:       -Persistent atrial fibrillation: The patient remains in atrial fibrillation on exam today.  His recent outpatient evaluation with electrophysiology revealed atrial fibrillation RVR with heart rates in the 130s at that time (10/21/2021).  His rates today are elevated and on EKG are 125 beats per minute.  He has had no medications this morning.  He is on rate controlling medications including Toprol-XL and remains on amiodarone.  He has required cardioversion in the past for management of his rhythm and had recent suggestion by his EP doctor, per the patient report, about repeat cardioversion prior to his ablation.  Unfortunately his first cardioversion was successful but symptoms returned in approximately 4 weeks and his repeat cardioversion in July his symptoms recurred in approximately 10 days.  I am uncertain how long his benefit of a cardioversion would last at this time.  We will attempt improvement of his heart rates with increase of his beta-blocker therapy.  Increase Toprol-XL to 100 mg daily.  Monitor heart rate and blood pressure at home.  Continue with use of Lasix for management of shortness of breath or weight gain.  He also has recently missed some of his medications and if the patient did require cardioversion CHANTEL would need to be considered given interruption of anticoagulation.    -Chronic combined systolic and diastolic congestive heart failure: Initially when he was hospitalized, his EF was 40 to 45% (December 2020) and then dropped to 30 to 35% (March 2021).  He was transitioned from Lisinopril to Entresto. His Toprol XL was increased from 25 mg to 50 mg daily.  He has lasix at home for PRN.  His follow-up CHANTEL, prior to his cardioversion in May 2021, revealed improvement of his LVEF to 45 to 50%.  Continue current  GDMT for management of CHF.   Monitor weights at home.  Continue as needed Lasix at home.  The patient admits more recently that he has been taking 1 tablet daily, on average.    -Dilated cardiomyopathy: He has a mildly dilated left ventricle noted on echocardiogram.  He had a reduced LVEF of 30-35% on echocardiogram which was performed in March 2021.  He recently required transesophageal echocardiogram prior to his cardioversion.  LVEF, at that time, was estimated to be 45-50%. He is on guideline directed medical therapy due to his previous left ventricular systolic dysfunction.  He is to continue medications.    -Chronic anticoagulation: Initiated in December 2020.  Continue uninterrupted use of Xarelto for stroke risk reduction in the setting of atrial fibrillation.  Again, we discussed the importance of uninterrupted anticoagulation use.    I will reach back out to the patient next week to reevaluate his symptoms and vital signs after his recent adjustment of his beta-blocker.  Increase Toprol-XL from 50 mg daily to 100 mg daily.  Continue other medications including Entresto, Lasix, amiodarone.    Routine follow-up 3 months.

## 2021-11-10 NOTE — PATIENT INSTRUCTIONS
Hypertension, Adult  Hypertension is another name for high blood pressure. High blood pressure forces your heart to work harder to pump blood. This can cause problems over time.  There are two numbers in a blood pressure reading. There is a top number (systolic) over a bottom number (diastolic). It is best to have a blood pressure that is below 120/80. Healthy choices can help lower your blood pressure, or you may need medicine to help lower it.  What are the causes?  The cause of this condition is not known. Some conditions may be related to high blood pressure.  What increases the risk?  · Smoking.  · Having type 2 diabetes mellitus, high cholesterol, or both.  · Not getting enough exercise or physical activity.  · Being overweight.  · Having too much fat, sugar, calories, or salt (sodium) in your diet.  · Drinking too much alcohol.  · Having long-term (chronic) kidney disease.  · Having a family history of high blood pressure.  · Age. Risk increases with age.  · Race. You may be at higher risk if you are .  · Gender. Men are at higher risk than women before age 45. After age 65, women are at higher risk than men.  · Having obstructive sleep apnea.  · Stress.  What are the signs or symptoms?  · High blood pressure may not cause symptoms. Very high blood pressure (hypertensive crisis) may cause:  ? Headache.  ? Feelings of worry or nervousness (anxiety).  ? Shortness of breath.  ? Nosebleed.  ? A feeling of being sick to your stomach (nausea).  ? Throwing up (vomiting).  ? Changes in how you see.  ? Very bad chest pain.  ? Seizures.  How is this treated?  · This condition is treated by making healthy lifestyle changes, such as:  ? Eating healthy foods.  ? Exercising more.  ? Drinking less alcohol.  · Your health care provider may prescribe medicine if lifestyle changes are not enough to get your blood pressure under control, and if:  ? Your top number is above 130.  ? Your bottom number is above  80.  · Your personal target blood pressure may vary.  Follow these instructions at home:  Eating and drinking    · If told, follow the DASH eating plan. To follow this plan:  ? Fill one half of your plate at each meal with fruits and vegetables.  ? Fill one fourth of your plate at each meal with whole grains. Whole grains include whole-wheat pasta, brown rice, and whole-grain bread.  ? Eat or drink low-fat dairy products, such as skim milk or low-fat yogurt.  ? Fill one fourth of your plate at each meal with low-fat (lean) proteins. Low-fat proteins include fish, chicken without skin, eggs, beans, and tofu.  ? Avoid fatty meat, cured and processed meat, or chicken with skin.  ? Avoid pre-made or processed food.  · Eat less than 1,500 mg of salt each day.  · Do not drink alcohol if:  ? Your doctor tells you not to drink.  ? You are pregnant, may be pregnant, or are planning to become pregnant.  · If you drink alcohol:  ? Limit how much you use to:  § 0-1 drink a day for women.  § 0-2 drinks a day for men.  ? Be aware of how much alcohol is in your drink. In the [place] one drink equals one 12 oz bottle of beer (355 mL), one 5 oz glass of wine (148 mL), or one 1½ oz glass of hard liquor (44 mL).    Lifestyle    · Work with your doctor to stay at a healthy weight or to lose weight. Ask your doctor what the best weight is for you.  · Get at least 30 minutes of exercise most days of the week. This may include walking, swimming, or biking.  · Get at least 30 minutes of exercise that strengthens your muscles (resistance exercise) at least 3 days a week. This may include lifting weights or doing Pilates.  · Do not use any products that contain nicotine or tobacco, such as cigarettes, e-cigarettes, and chewing tobacco. If you need help quitting, ask your doctor.  · Check your blood pressure at home as told by your doctor.  · Keep all follow-up visits as told by your doctor. This is important.    Medicines  · Take  over-the-counter and prescription medicines only as told by your doctor. Follow directions carefully.  · Do not skip doses of blood pressure medicine. The medicine does not work as well if you skip doses. Skipping doses also puts you at risk for problems.  · Ask your doctor about side effects or reactions to medicines that you should watch for.  Contact a doctor if you:  · Think you are having a reaction to the medicine you are taking.  · Have headaches that keep coming back (recurring).  · Feel dizzy.  · Have swelling in your ankles.  · Have trouble with your vision.  Get help right away if you:  · Get a very bad headache.  · Start to feel mixed up (confused).  · Feel weak or numb.  · Feel faint.  · Have very bad pain in your:  ? Chest.  ? Belly (abdomen).  · Throw up more than once.  · Have trouble breathing.  Summary  · Hypertension is another name for high blood pressure.  · High blood pressure forces your heart to work harder to pump blood.  · For most people, a normal blood pressure is less than 120/80.  · Making healthy choices can help lower blood pressure. If your blood pressure does not get lower with healthy choices, you may need to take medicine.  This information is not intended to replace advice given to you by your health care provider. Make sure you discuss any questions you have with your health care provider.  Document Revised: 08/28/2019 Document Reviewed: 08/28/2019  [employer] Patient Education © 2021 [employer]  Heart Failure, Diagnosis    Heart failure is a condition in which the heart has trouble pumping blood because it has become weak or stiff. This means that the heart does not pump blood well enough for the body to stay healthy. For some people with heart failure, fluid may back up into the lungs. There may also be swelling (edema) in the lower legs. Heart failure is usually a long-term (chronic) condition. It is important for you to take good care of yourself and follow the treatment  plan from your health care provider.  What are the causes?  This condition may be caused by:  · High blood pressure (hypertension). Hypertension causes the heart muscle to work harder than normal. This makes the heart stiff or weak.  · Coronary artery disease, or CAD. CAD is the buildup of cholesterol and fat (plaque) in the arteries of the heart.  · Heart attack, also called myocardial infarction. This injures the heart muscle, making it hard for the heart to pump blood.  · Abnormal heart valves. The valves do not open and close properly, forcing the heart to pump harder to keep the blood flowing.  · Heart muscle disease (cardiomyopathy or myocarditis). This is damage to the heart muscle. It can increase the risk of heart failure.  · Lung disease. The heart works harder when the lungs are not healthy.  · Abnormal heart rhythms. These can lead to heart failure.  What increases the risk?  The risk of heart failure increases as a person ages. This condition is also more likely to develop in people who:  · Are overweight.  · Are male.  · Smoke or chew tobacco.  · Abuse alcohol or illegal drugs.  · Have taken medicines that can damage the heart, such as chemotherapy drugs.  · Have diabetes.  · Have abnormal heart rhythms.  · Have thyroid problems.  · Have low blood counts (anemia).  What are the signs or symptoms?  Symptoms of this condition include:  · Shortness of breath with activity, such as when climbing stairs.  · A cough that does not go away.  · Swelling of the feet, ankles, legs, or abdomen.  · Losing weight for no reason.  · Trouble breathing when lying flat (orthopnea).  · Waking from sleep because of the need to sit up and get more air.  · Rapid heartbeat.  · Tiredness (fatigue) and loss of energy.  · Feeling light-headed, dizzy, or close to fainting.  · Loss of appetite.  · Nausea.  · Waking up more often during the night to urinate (nocturia).  · Confusion.  How is this diagnosed?  This condition is  diagnosed based on:  · Your medical history, symptoms, and a physical exam.  · Diagnostic tests, which may include:  ? Echocardiogram.  ? Electrocardiogram (ECG).  ? Chest X-ray.  ? Blood tests.  ? Exercise stress test.  ? Radionuclide scans.  ? Cardiac catheterization and angiogram.  How is this treated?  Treatment for this condition is aimed at managing the symptoms of heart failure.  Medicines  Treatment may include medicines that:  · Help lower blood pressure by relaxing (dilating) the blood vessels. These medicines are called ACE inhibitors (angiotensin-converting enzyme) and ARBs (angiotensin receptor blockers).  · Cause the kidneys to remove salt and water from the blood through urination (diuretics).  · Improve heart muscle strength and prevent the heart from beating too fast (beta blockers).  · Increase the force of the heartbeat (digoxin).  Healthy behavior changes         Treatment may also include making healthy lifestyle changes, such as:  · Reaching and staying at a healthy weight.  · Quitting smoking or chewing tobacco.  · Eating heart-healthy foods.  · Limiting or avoiding alcohol.  · Stopping the use of illegal drugs.  · Being physically active.    Other treatments  Other treatments may include:  · Procedures to open blocked arteries or repair damaged valves.  · Placing a pacemaker to improve heart function (cardiac resynchronization therapy).  · Placing a device to treat serious abnormal heart rhythms (implantable cardioverter defibrillator, or ICD).  · Placing a device to improve the pumping ability of the heart (left ventricular assist device, or LVAD).  · Receiving a healthy heart from a donor (heart transplant). This is done when other treatments have not helped.  Follow these instructions at home:  · Manage other health conditions as told by your health care provider. These may include hypertension, diabetes, thyroid disease, or abnormal heart rhythms.  · Get ongoing education and support as  needed. Learn as much as you can about heart failure.  · Keep all follow-up visits as told by your health care provider. This is important.  Summary  · Heart failure is a condition in which the heart has trouble pumping blood because it has become weak or stiff.  · This condition is caused by high blood pressure and other diseases of the heart and lungs.  · Symptoms of this condition include shortness of breath, tiredness (fatigue), nausea, and swelling of the feet, ankles, legs, or abdomen.  · Treatments for this condition may include medicines, lifestyle changes, and surgery.  · Manage other health conditions as told by your health care provider.  This information is not intended to replace advice given to you by your health care provider. Make sure you discuss any questions you have with your health care provider.  Document Revised: 03/06/2020 Document Reviewed: 03/06/2020  [employer] Patient Education © 2021 [employer]

## 2021-11-18 ENCOUNTER — TELEPHONE (OUTPATIENT)
Dept: CARDIOLOGY | Facility: CLINIC | Age: 66
End: 2021-11-18

## 2021-11-18 NOTE — TELEPHONE ENCOUNTER
I have called Mr Sanchez to check on how he is doing. He said his BP has been 105 and 107's systolic and around 79 -84 diastolic. His heart rate has been around 107-110. He has been feeling ok but still fatigue.

## 2022-02-10 ENCOUNTER — OFFICE VISIT (OUTPATIENT)
Dept: CARDIOLOGY | Facility: CLINIC | Age: 67
End: 2022-02-10

## 2022-02-10 VITALS
WEIGHT: 174 LBS | HEIGHT: 67 IN | DIASTOLIC BLOOD PRESSURE: 75 MMHG | SYSTOLIC BLOOD PRESSURE: 120 MMHG | HEART RATE: 77 BPM | RESPIRATION RATE: 18 BRPM | OXYGEN SATURATION: 98 % | BODY MASS INDEX: 27.31 KG/M2

## 2022-02-10 DIAGNOSIS — Z79.01 CHRONIC ANTICOAGULATION: Chronic | ICD-10-CM

## 2022-02-10 DIAGNOSIS — I42.0 DILATED CARDIOMYOPATHY: Chronic | ICD-10-CM

## 2022-02-10 DIAGNOSIS — I34.0 NONRHEUMATIC MITRAL VALVE REGURGITATION: ICD-10-CM

## 2022-02-10 DIAGNOSIS — Z79.899 ON AMIODARONE THERAPY: ICD-10-CM

## 2022-02-10 DIAGNOSIS — Z98.890 HISTORY OF CARDIAC RADIOFREQUENCY ABLATION: ICD-10-CM

## 2022-02-10 DIAGNOSIS — I48.19 PERSISTENT ATRIAL FIBRILLATION: Chronic | ICD-10-CM

## 2022-02-10 DIAGNOSIS — I50.42 CHRONIC COMBINED SYSTOLIC AND DIASTOLIC CONGESTIVE HEART FAILURE: Primary | Chronic | ICD-10-CM

## 2022-02-10 PROCEDURE — 93000 ELECTROCARDIOGRAM COMPLETE: CPT | Performed by: NURSE PRACTITIONER

## 2022-02-10 PROCEDURE — 99214 OFFICE O/P EST MOD 30 MIN: CPT | Performed by: NURSE PRACTITIONER

## 2022-02-10 RX ORDER — AMIODARONE HYDROCHLORIDE 200 MG/1
200 TABLET ORAL DAILY
Start: 2022-02-10 | End: 2022-07-18

## 2022-02-10 NOTE — PATIENT INSTRUCTIONS
Heart Failure, Diagnosis    Heart failure means that your heart is not able to pump blood in the right way. This makes it hard for your body to work well. Heart failure is usually a long-term (chronic) condition. You must take good care of yourself and follow your treatment plan from your doctor.  What are the causes?  This condition may be caused by:  · High blood pressure.  · Build up of cholesterol and fat in the arteries.  · Heart attack. This injures the heart muscle.  · Heart valves that do not open and close properly.  · Damage of the heart muscle. This is also called cardiomyopathy.  · Lung disease.  · Abnormal heart rhythms.  What increases the risk?  The risk of heart failure goes up as a person ages. This condition is also more likely to develop in people who:  · Are overweight.  · Are male.  · Smoke or chew tobacco.  · Abuse alcohol or illegal drugs.  · Have taken medicines that can damage the heart.  · Have diabetes.  · Have abnormal heart rhythms.  · Have thyroid problems.  · Have low blood counts (anemia).  What are the signs or symptoms?  Symptoms of this condition include:  · Shortness of breath.  · Coughing.  · Swelling of the feet, ankles, legs, or belly.  · Losing weight for no reason.  · Trouble breathing.  · Waking from sleep because of the need to sit up and get more air.  · Rapid heartbeat.  · Being very tired.  · Feeling dizzy, or feeling like you may pass out (faint).  · Having no desire to eat.  · Feeling like you may vomit (nauseous).  · Peeing (urinating) more at night.  · Feeling confused.  How is this treated?         This condition may be treated with:  · Medicines. These can be given to treat blood pressure and to make the heart muscles stronger.  · Changes in your daily life. These may include eating a healthy diet, staying at a healthy body weight, quitting tobacco and illegal drug use, or doing exercises.  · Surgery. Surgery can be done to open blocked valves, or to put devices in  the heart, such as pacemakers.  · A donor heart (heart transplant). You will receive a healthy heart from a donor.  Follow these instructions at home:  · Treat other conditions as told by your doctor. These may include high blood pressure, diabetes, thyroid disease, or abnormal heart rhythms.  · Learn as much as you can about heart failure.  · Get support as you need it.  · Keep all follow-up visits as told by your doctor. This is important.  Summary  · Heart failure means that your heart is not able to pump blood in the right way.  · This condition is caused by high blood pressure, heart attack, or damage of the heart muscle.  · Symptoms of this condition include shortness of breath and swelling of the feet, ankles, legs, or belly. You may also feel very tired or feel like you may vomit.  · You may be treated with medicines, surgery, or changes in your daily life.  · Treat other health conditions as told by your doctor.  This information is not intended to replace advice given to you by your health care provider. Make sure you discuss any questions you have with your health care provider.  Document Revised: 03/06/2020 Document Reviewed: 03/06/2020  [employer] Patient Education © 2021 [employer]  Atrial Fibrillation    Atrial fibrillation is a type of irregular or rapid heartbeat (arrhythmia). In atrial fibrillation, the top part of the heart (atria) beats in an irregular pattern. This makes the heart unable to pump blood normally and effectively.  The goal of treatment is to prevent blood clots from forming, control your heart rate, or restore your heartbeat to a normal rhythm. If this condition is not treated, it can cause serious problems, such as a weakened heart muscle (cardiomyopathy) or a stroke.  What are the causes?  This condition is often caused by medical conditions that damage the heart's electrical system. These include:  · High blood pressure (hypertension). This is the most common cause.  · Certain  heart problems or conditions, such as heart failure, coronary artery disease, heart valve problems, or heart surgery.  · Diabetes.  · Overactive thyroid (hyperthyroidism).  · Obesity.  · Chronic kidney disease.  In some cases, the cause of this condition is not known.  What increases the risk?  This condition is more likely to develop in:  · Older people.  · People who smoke.  · Athletes who do endurance exercise.  · People who have a family history of atrial fibrillation.  · Men.  · People who use drugs.  · People who drink a lot of alcohol.  · People who have lung conditions, such as emphysema, pneumonia, or COPD.  · People who have obstructive sleep apnea.  What are the signs or symptoms?  Symptoms of this condition include:  · A feeling that your heart is racing or beating irregularly.  · Discomfort or pain in your chest.  · Shortness of breath.  · Sudden light-headedness or weakness.  · Tiring easily during exercise or activity.  · Fatigue.  · Syncope (fainting).  · Sweating.  In some cases, there are no symptoms.  How is this diagnosed?  Your health care provider may detect atrial fibrillation when taking your pulse. If detected, this condition may be diagnosed with:  · An electrocardiogram (ECG) to check electrical signals of the heart.  · An ambulatory cardiac monitor to record your heart's activity for a few days.  · A transthoracic echocardiogram (TTE) to create pictures of your heart.  · A transesophageal echocardiogram (CHANTEL) to create even closer pictures of your heart.  · A stress test to check your blood supply while you exercise.  · Imaging tests, such as a CT scan or chest X-ray.  · Blood tests.  How is this treated?  Treatment depends on underlying conditions and how you feel when you experience atrial fibrillation. This condition may be treated with:  · Medicines to prevent blood clots or to treat heart rate or heart rhythm problems.  · Electrical cardioversion to reset the heart's rhythm.  · A  pacemaker to correct abnormal heart rhythm.  · Ablation to remove the heart tissue that sends abnormal signals.  · Left atrial appendage closure to seal the area where blood clots can form.  In some cases, underlying conditions will be treated.  Follow these instructions at home:  Medicines  · Take over-the counter and prescription medicines only as told by your health care provider.  · Do not take any new medicines without talking to your health care provider.  · If you are taking blood thinners:  ? Talk with your health care provider before you take any medicines that contain aspirin or NSAIDs, such as ibuprofen. These medicines increase your risk for dangerous bleeding.  ? Take your medicine exactly as told, at the same time every day.  ? Avoid activities that could cause injury or bruising, and follow instructions about how to prevent falls.  ? Wear a medical alert bracelet or carry a card that lists what medicines you take.  Lifestyle         · Do not use any products that contain nicotine or tobacco, such as cigarettes, e-cigarettes, and chewing tobacco. If you need help quitting, ask your health care provider.  · Eat heart-healthy foods. Talk with a dietitian to make an eating plan that is right for you.  · Exercise regularly as told by your health care provider.  · Do not drink alcohol.  · Lose weight if you are overweight.  · Do not use drugs, including cannabis.  General instructions  · If you have obstructive sleep apnea, manage your condition as told by your health care provider.  · Do not use diet pills unless your health care provider approves. Diet pills can make heart problems worse.  · Keep all follow-up visits as told by your health care provider. This is important.  Contact a health care provider if you:  · Notice a change in the rate, rhythm, or strength of your heartbeat.  · Are taking a blood thinner and you notice more bruising.  · Tire more easily when you exercise or do heavy work.  · Have a  "sudden change in weight.  Get help right away if you have:    · Chest pain, abdominal pain, sweating, or weakness.  · Trouble breathing.  · Side effects of blood thinners, such as blood in your vomit, stool, or urine, or bleeding that cannot stop.  · Any symptoms of a stroke. \"BE FAST\" is an easy way to remember the main warning signs of a stroke:  ? B - Balance. Signs are dizziness, sudden trouble walking, or loss of balance.  ? E - Eyes. Signs are trouble seeing or a sudden change in vision.  ? F - Face. Signs are sudden weakness or numbness of the face, or the face or eyelid drooping on one side.  ? A - Arms. Signs are weakness or numbness in an arm. This happens suddenly and usually on one side of the body.  ? S - Speech. Signs are sudden trouble speaking, slurred speech, or trouble understanding what people say.  ? T - Time. Time to call emergency services. Write down what time symptoms started.  · Other signs of a stroke, such as:  ? A sudden, severe headache with no known cause.  ? Nausea or vomiting.  ? Seizure.  These symptoms may represent a serious problem that is an emergency. Do not wait to see if the symptoms will go away. Get medical help right away. Call your local emergency services (911 in the [place] Do not drive yourself to the hospital.  Summary  · Atrial fibrillation is a type of irregular or rapid heartbeat (arrhythmia).  · Symptoms include a feeling that your heart is beating fast or irregularly.  · You may be given medicines to prevent blood clots or to treat heart rate or heart rhythm problems.  · Get help right away if you have signs or symptoms of a stroke.  · Get help right away if you cannot catch your breath or have chest pain or pressure.  This information is not intended to replace advice given to you by your health care provider. Make sure you discuss any questions you have with your health care provider.  Document Revised: 06/10/2020 Document Reviewed: 06/10/2020  [employer] Patient " Education © 2021 [employer]

## 2022-02-10 NOTE — PROGRESS NOTES
Subjective:     Encounter Date:02/10/2022      Patient ID: Ryan Sanchez is a 66 y.o. male with known atrial fibrillation, long term anticoagulation, chronic systolic congestive heart failure, cardiomyopathy, diabetes mellitus, and previous EtOH abuse who presents today for follow up of CHF and AF.      Chief Complaint: CHF follow up   Atrial Fibrillation  Presents for follow-up visit. Symptoms include shortness of breath. Symptoms are negative for bradycardia, chest pain, dizziness, hemodynamic instability, hypotension, palpitations, syncope, tachycardia and weakness. (+Weight gain ) The symptoms have been stable. Past medical history includes atrial fibrillation and CHF. There are no medication compliance problems.   Congestive Heart Failure  Presents for follow-up visit. Associated symptoms include shortness of breath and unexpected weight change. Pertinent negatives include no abdominal pain, chest pain, chest pressure, edema, fatigue, near-syncope, orthopnea, palpitations or paroxysmal nocturnal dyspnea. The symptoms have been stable. Compliance with total regimen is %. Compliance with diet is %. Compliance with exercise is %. Compliance with medications is %.     Mr. Sanchez was first seen by our service in consultation in December 2020 for new findings of atrial fibrillation with RVR.  He had presented to the hospital with complaints of shortness of breath and found to be in atrial fibrillation, chronicity unknown at that time.  He had reported at least a month of symptoms including shortness of breath, sharp left-sided chest pain.  During that hospital evaluation the patient had echocardiogram and was found to have a reduced left ventricular systolic function.  His EF was estimated to be 40 to 45% at that time.  Mild mitral regurgitation was noted.  During that hospitalization the patient was started on oral anticoagulation, ACE inhibitor, loop diuretic, beta-blocker for management  "of his systolic heart failure, and medication adjustments for improvement of his heart rates.  During his hospitalization a CT angiogram was ordered of his chest for evaluation of his complaints of shortness of breath.  This revealed a left atrial appendage thrombus.  It was recommended that the patient follow-up after discharge with cardiology, however it does not appear that an appointment was made when he was discharged from the hospital.      Subsequently, the patient presented back to the hospital in March with complaints of shortness of breath and palpitations.  He was evaluated in the emergency department and admitted for further evaluation and care.  The ER note mentions that the patient had been \"converted\" however it appears he was discharged in December in atrial fibrillation without follow-up in between.  It is likely the patient has been in persistent A. fib since that time.  During the hospitalization in March cardiology was consulted due to atrial fibrillation.  The patient had reported his heart pounding and heart palpitations.  He reported easy fatigability and shortness of breath.  He denied chest pain at that time as well.  He had previously been a heavy alcohol drinker and reported cessation of alcohol use but endorsed drinking a significant amount of coffee each day.  When he presented he was started on IV amiodarone due to rapid ventricular response on presentation.  He was converted to oral amiodarone at discharge.  His diltiazem was discontinued.  His Xarelto was continued for anticoagulation use and he was continued on his lisinopril and Toprol-XL at discharge.  He was using furosemide as needed.    He presented to the office for follow-up 4/16/21.  He was in atrial fibrillation on EKG his heart rate was 99.  He reported fatigue.  His shortness of breath was improved.  He does have episodes of shortness of breath generally related to elevated heart rates.  He reported improvement of his " heart rates and shortness of breath after taking an extra dose of his metoprolol at home on a couple occasions since he has been discharged.  He was on anticoagulation with Xarelto, but had not been taking this with his largest meal.  A repeat echocardiogram was obtained during his hospitalization in March.  His left ventricular systolic function has reduced since December (40-45% down to 30-35%).   It is conceivable that this is due to poor rate control with atrial fibrillation.  He was mildly volume overloaded during his most recent hospitalization and received some IV diuretics during that time as well.    He was transitioned from lisinopril to Entresto. His beta blocker dose was increased and he was instructed to monitor weights, and symptoms, and use lasix as needed. He was instructed to change Xarelto to taking with his largest meal. He presented on 4/29 after initiating Entresto and was doing well from a CHF standpoint. He was still having some fatigue at that time. He was scheduled for outpatient cardioversion.     He was cardioverted successfully with CHANTEL/Cardioversion on 5/5/21.  He felt improved after discharge. He was doing well and noticed and increase in heart rate with slow weight gain within one month afterwards.  He was taking his Entresto and Toprol as scheduled.  He was found to be back in AF on EKG at follow up. He was referred to EP.  He was seen by EP and ablation was recommended. This was scheduled and completed in December 2021.  His CHANTEL noted improvement of his EF from 31-35% to 46-50%.  His mitral valve on CHANTEL had trace to mild regurgitation, previously noted to be moderate.    He presents back to our office today for routine follow up. After his ablation he was discharged home with no complaints. He has felt better slowly over the past 2 months. He notes improvement in fatigue, shortness of breath, and palpitations. He remains anticoagulated and on amiodarone. He sees Dr. Monteiro  tomorrow.     He still states that he is short of breath to some degree. He feels that if he walks flights of stairs or for long periods of time he starts to become winded. Walking into the office today from the parking lot he reports he did fine. He has no orthopnea or PND.  His weights gradually went up 8-10 pounds slowly over the past 4 months. He denies rapid weight gain and attributes this to poor diet and low exercise.       The following portions of the patient's history were reviewed and updated as appropriate: allergies, current medications, past family history, past medical history, past social history and past surgical history.     No Known Allergies      Current Outpatient Medications:   •  acetaminophen (TYLENOL) 325 MG tablet, Take 2 tablets by mouth Every 4 (Four) Hours As Needed for Mild Pain ., Disp: 30 tablet, Rfl: 0  •  amiodarone (PACERONE) 200 MG tablet, Take 1 tablet by mouth Daily., Disp: , Rfl:   •  celecoxib (CeleBREX) 200 MG capsule, Take 200 mg by mouth As Needed., Disp: , Rfl:   •  furosemide (Lasix) 40 MG tablet, Take 1 tablet by mouth Daily As Needed (Weight gain, shortness of breath, swelling)., Disp: 30 tablet, Rfl: 3  •  loratadine (CLARITIN) 10 MG tablet, Take 10 mg by mouth As Needed., Disp: , Rfl:   •  metoprolol succinate XL (TOPROL-XL) 50 MG 24 hr tablet, Take 2 tablets by mouth Daily., Disp: 60 tablet, Rfl: 11  •  rivaroxaban (XARELTO) 20 MG tablet, Take 20 mg by mouth Daily., Disp: , Rfl:   •  sacubitril-valsartan (ENTRESTO) 24-26 MG tablet, Take 1 tablet by mouth 2 (Two) Times a Day., Disp: 60 tablet, Rfl: 11      Review of Systems   Constitutional: Positive for malaise/fatigue and unexpected weight change. Negative for chills, diaphoresis, fatigue, fever and weight gain.   Cardiovascular: Positive for dyspnea on exertion. Negative for chest pain, leg swelling, near-syncope, orthopnea, palpitations, paroxysmal nocturnal dyspnea and syncope.   Respiratory: Positive for  "shortness of breath. Negative for cough and wheezing.    Hematologic/Lymphatic: Negative for bleeding problem. Bruises/bleeds easily.   Musculoskeletal: Positive for joint pain.   Gastrointestinal: Negative for bloating, abdominal pain, nausea and vomiting.   Neurological: Negative for dizziness, focal weakness, headaches, light-headedness and weakness.   Psychiatric/Behavioral: Negative for altered mental status.         ECG 12 Lead    Date/Time: 2/10/2022 9:58 AM  Performed by: Kortney Valentino APRN  Authorized by: Kortney Valentino APRN   Comparison: compared with previous ECG from 11/10/2021  Comparison to previous ECG: Sinus rhythm replaces atrial fibrillation with rapid ventricular response  Rhythm: sinus rhythm  Rate: normal  BPM: 77  Conduction: conduction normal  ST Segments: ST segments normal  T Waves: T waves normal  QRS axis: left  Other findings: poor R wave progression    Clinical impression: abnormal EKG             /75 (BP Location: Right arm, Patient Position: Sitting, Cuff Size: Adult)   Pulse 77   Resp 18   Ht 170.2 cm (67\")   Wt 78.9 kg (174 lb)   SpO2 98%   BMI 27.25 kg/m²        Objective:     Vitals reviewed.   Constitutional:       General: Awake. Not in acute distress.     Appearance: Well-developed, well-groomed, overweight and not in distress. Chronically ill-appearing.   HENT:      Head: Normocephalic and atraumatic.   Pulmonary:      Effort: Pulmonary effort is normal.      Breath sounds: Normal breath sounds.   Cardiovascular:      Normal rate. Regular rhythm.      Murmurs: There is no murmur.      No gallop. No rub.   Edema:     Peripheral edema absent.   Abdominal:      General: Bowel sounds are normal.   Musculoskeletal:      Cervical back: Normal range of motion and neck supple. Skin:     General: Skin is warm and dry.   Neurological:      Mental Status: Alert, oriented to person, place, and time and oriented to person, place and time.   Psychiatric:         Attention " and Perception: Attention normal.         Mood and Affect: Mood and affect normal.         Speech: Speech normal.         Behavior: Behavior is cooperative.       Lab Review:    12/02/2021 EP Study/Ablation  Notes:  Pulmonary vein isolation successfully achieved by cryo- balloon ablation, with inclusion of the posterior wall, CTI line placement, and spontaneous termination of AF. Dr. Derrick Monteiro       Results for orders placed during the hospital encounter of 05/05/21    Adult Transesophageal Echo (CHANTEL) W/ Cont if Necessary Per Protocol    Interpretation Summary  · Left ventricular systolic function is mildly decreased. Left ventricular ejection fraction appears to be 46 - 50%.  · No evidence of a left atrial appendage thrombus.  · Trace to mild mitral valve regurgitation is present.    Interpretation Summary  TTE 3/26/21  · Left ventricular ejection fraction appears to be 31 - 35%. Left ventricular systolic function is moderately decreased.  · Left ventricular diastolic function is consistent with (grade II w/high LAP) pseudonormalization.  · The left atrial cavity is mildly dilated.  · Mitral annular calcification is present. Moderate mitral valve regurgitation is present. No significant mitral valve stenosis is present.  · No evidence of pulmonary hypertension is present.        CT angiogram chest: 12/26/2020-impression relates left atrial appendage thrombus.      Assessment:          Diagnosis Plan   1. Chronic combined systolic and diastolic congestive heart failure (HCC)     2. Dilated cardiomyopathy (HCC)     3. Persistent atrial fibrillation (HCC)     4. History of cardiac radiofrequency ablation     5. On amiodarone therapy     6. Chronic anticoagulation     7. Nonrheumatic mitral valve regurgitation            Plan:       -Chronic combined systolic and diastolic congestive heart failure: Table.  NYHA class II.  Initially when he was hospitalized, his EF was 40 to 45% (December 2020) and then dropped to  30 to 35% (March 2021).  He was transitioned from Lisinopril to Entresto.  He is on Toprol-XL.  He has lasix at home for PRN.  His follow-up CHANTEL, prior to his cardioversion in May 2021, revealed improvement of his LVEF to 45 to 50%.  Continue current GDMT for management of CHF.   Monitor weights at home.  Continue as needed Lasix at home.  He had previously been taking 1 tablet daily, more recently he has not been using this at all.  CHF teaching given again today regarding use of as needed diuretic therapy.    -Dilated cardiomyopathy: He has a mildly dilated left ventricle noted on echocardiogram.  He had a reduced LVEF of 30-35% on echocardiogram which was performed in March 2021.  He required CHANTEL prior to his cardioversion.  Left ventricular ejection fraction at that time estimated to be 45 to 50%.  At the time of diagnosis it was felt that his dilated cardiomyopathy may be due to to alcohol abuse or tachycardia mediated.  He has not had an ischemic work-up recently.  He did have a dobutamine stress echo in April 2019 ordered by his primary care office for complaints of shortness of breath.  He does complain of dyspnea on exertion with long periods of walking and with climbing stairs currently.  He has recently had improving symptoms since the stabilization of his rhythm with ablation.  Consider further ischemic work-up in near future with ongoing symptoms.    -Atrial fibrillation: Improved.  He is now status post ablation per Dr. Monteiro in December 2021.  EKG today exhibits normal sinus rhythm.  The patient denies any palpitations.  He had improvement of fatigue, shortness of breath, palpitations with ablation.  He remains anticoagulated and is on amiodarone daily.  He sees Dr. Monteiro in the outpatient clinic tomorrow.  Continue current medications including anticoagulation, antiarrhythmic, beta-blocker therapies.  I have encouraged the patient to discuss his current medications with Dr. Monteiro tomorrow  regarding long-term use.    -Chronic anticoagulation: Initiated in December 2020.  Continue uninterrupted use of Xarelto for stroke risk reduction in the setting of atrial fibrillation.  Again, we discussed the importance of uninterrupted anticoagulation use.       Routine follow-up 3 months.

## 2022-02-14 ENCOUNTER — TRANSCRIBE ORDERS (OUTPATIENT)
Dept: ADMINISTRATIVE | Facility: HOSPITAL | Age: 67
End: 2022-02-14

## 2022-03-02 ENCOUNTER — TRANSCRIBE ORDERS (OUTPATIENT)
Dept: ADMINISTRATIVE | Facility: HOSPITAL | Age: 67
End: 2022-03-02

## 2022-03-02 DIAGNOSIS — I48.91 ATRIAL FIBRILLATION, UNSPECIFIED TYPE: Primary | ICD-10-CM

## 2022-04-01 ENCOUNTER — HOSPITAL ENCOUNTER (OUTPATIENT)
Dept: CARDIOLOGY | Facility: HOSPITAL | Age: 67
Discharge: HOME | End: 2022-04-01
Admitting: INTERNAL MEDICINE

## 2022-04-01 PROCEDURE — 93306 TTE W/DOPPLER COMPLETE: CPT

## 2022-04-01 PROCEDURE — 93306 TTE W/DOPPLER COMPLETE: CPT | Performed by: INTERNAL MEDICINE

## 2022-04-11 LAB
BH CV ECHO MEAS - AO MAX PG (FULL): 2.7 MMHG
BH CV ECHO MEAS - AO MAX PG: 6.6 MMHG
BH CV ECHO MEAS - AO MEAN PG (FULL): 2 MMHG
BH CV ECHO MEAS - AO MEAN PG: 4 MMHG
BH CV ECHO MEAS - AO ROOT AREA (BSA CORRECTED): 1.6
BH CV ECHO MEAS - AO ROOT AREA: 7.1 CM^2
BH CV ECHO MEAS - AO ROOT DIAM: 3 CM
BH CV ECHO MEAS - AO V2 MAX: 128 CM/SEC
BH CV ECHO MEAS - AO V2 MEAN: 90.3 CM/SEC
BH CV ECHO MEAS - AO V2 VTI: 28.2 CM
BH CV ECHO MEAS - AVA(I,A): 1.8 CM^2
BH CV ECHO MEAS - AVA(I,D): 1.8 CM^2
BH CV ECHO MEAS - AVA(V,A): 1.9 CM^2
BH CV ECHO MEAS - AVA(V,D): 1.9 CM^2
BH CV ECHO MEAS - BSA(HAYCOCK): 1.9 M^2
BH CV ECHO MEAS - BSA: 1.9 M^2
BH CV ECHO MEAS - BZI_BMI: 27.3 KILOGRAMS/M^2
BH CV ECHO MEAS - BZI_METRIC_HEIGHT: 170.2 CM
BH CV ECHO MEAS - BZI_METRIC_WEIGHT: 78.9 KG
BH CV ECHO MEAS - EDV(CUBED): 38.8 ML
BH CV ECHO MEAS - EDV(MOD-SP2): 56.3 ML
BH CV ECHO MEAS - EDV(MOD-SP4): 68.3 ML
BH CV ECHO MEAS - EDV(TEICH): 46.9 ML
BH CV ECHO MEAS - EF(CUBED): 78.4 %
BH CV ECHO MEAS - EF(MOD-SP2): 43.7 %
BH CV ECHO MEAS - EF(MOD-SP4): 67.6 %
BH CV ECHO MEAS - EF(TEICH): 71.8 %
BH CV ECHO MEAS - ESV(CUBED): 8.4 ML
BH CV ECHO MEAS - ESV(MOD-SP2): 31.7 ML
BH CV ECHO MEAS - ESV(MOD-SP4): 22.1 ML
BH CV ECHO MEAS - ESV(TEICH): 13.2 ML
BH CV ECHO MEAS - FS: 40 %
BH CV ECHO MEAS - IVS/LVPW: 1.7
BH CV ECHO MEAS - IVSD: 1.4 CM
BH CV ECHO MEAS - LA DIMENSION: 3.7 CM
BH CV ECHO MEAS - LA/AO: 1.2
BH CV ECHO MEAS - LAT PEAK E' VEL: 5.8 CM/SEC
BH CV ECHO MEAS - LV DIASTOLIC VOL/BSA (35-75): 35.8 ML/M^2
BH CV ECHO MEAS - LV MASS(C)D: 116.8 GRAMS
BH CV ECHO MEAS - LV MASS(C)DI: 61.3 GRAMS/M^2
BH CV ECHO MEAS - LV MAX PG: 3.8 MMHG
BH CV ECHO MEAS - LV MEAN PG: 2 MMHG
BH CV ECHO MEAS - LV SYSTOLIC VOL/BSA (12-30): 11.6 ML/M^2
BH CV ECHO MEAS - LV V1 MAX: 97.9 CM/SEC
BH CV ECHO MEAS - LV V1 MEAN: 67.3 CM/SEC
BH CV ECHO MEAS - LV V1 VTI: 19.4 CM
BH CV ECHO MEAS - LVIDD: 3.4 CM
BH CV ECHO MEAS - LVIDS: 2 CM
BH CV ECHO MEAS - LVLD AP2: 7.4 CM
BH CV ECHO MEAS - LVLD AP4: 7.2 CM
BH CV ECHO MEAS - LVLS AP2: 5.7 CM
BH CV ECHO MEAS - LVLS AP4: 5.8 CM
BH CV ECHO MEAS - LVOT AREA (M): 2.5 CM^2
BH CV ECHO MEAS - LVOT AREA: 2.5 CM^2
BH CV ECHO MEAS - LVOT DIAM: 1.8 CM
BH CV ECHO MEAS - LVPWD: 0.85 CM
BH CV ECHO MEAS - MED PEAK E' VEL: 3.83 CM/SEC
BH CV ECHO MEAS - MR MAX PG: 29.8 MMHG
BH CV ECHO MEAS - MR MAX VEL: 273 CM/SEC
BH CV ECHO MEAS - MV A MAX VEL: 70.2 CM/SEC
BH CV ECHO MEAS - MV DEC TIME: 0.35 SEC
BH CV ECHO MEAS - MV E MAX VEL: 65.7 CM/SEC
BH CV ECHO MEAS - MV E/A: 0.94
BH CV ECHO MEAS - RVDD: 3.6 CM
BH CV ECHO MEAS - SI(AO): 104.6 ML/M^2
BH CV ECHO MEAS - SI(CUBED): 16 ML/M^2
BH CV ECHO MEAS - SI(LVOT): 25.9 ML/M^2
BH CV ECHO MEAS - SI(MOD-SP2): 12.9 ML/M^2
BH CV ECHO MEAS - SI(MOD-SP4): 24.2 ML/M^2
BH CV ECHO MEAS - SI(TEICH): 17.7 ML/M^2
BH CV ECHO MEAS - SV(AO): 199.3 ML
BH CV ECHO MEAS - SV(CUBED): 30.4 ML
BH CV ECHO MEAS - SV(LVOT): 49.4 ML
BH CV ECHO MEAS - SV(MOD-SP2): 24.6 ML
BH CV ECHO MEAS - SV(MOD-SP4): 46.2 ML
BH CV ECHO MEAS - SV(TEICH): 33.7 ML
BH CV ECHO MEAS - TR MAX VEL: 102 CM/SEC
BH CV ECHO MEASUREMENTS AVERAGE E/E' RATIO: 13.64
LEFT ATRIUM VOLUME INDEX: 38.6 ML/M2
LEFT ATRIUM VOLUME: 73.7 CM3
MAXIMAL PREDICTED HEART RATE: 153 BPM
STRESS TARGET HR: 130 BPM

## 2022-05-10 ENCOUNTER — OFFICE VISIT (OUTPATIENT)
Dept: CARDIOLOGY | Facility: CLINIC | Age: 67
End: 2022-05-10

## 2022-05-10 VITALS
HEART RATE: 80 BPM | HEIGHT: 67 IN | RESPIRATION RATE: 18 BRPM | OXYGEN SATURATION: 98 % | BODY MASS INDEX: 26.37 KG/M2 | WEIGHT: 168 LBS | DIASTOLIC BLOOD PRESSURE: 84 MMHG | SYSTOLIC BLOOD PRESSURE: 138 MMHG

## 2022-05-10 DIAGNOSIS — I42.0 DILATED CARDIOMYOPATHY: Chronic | ICD-10-CM

## 2022-05-10 DIAGNOSIS — Z79.01 CHRONIC ANTICOAGULATION: Chronic | ICD-10-CM

## 2022-05-10 DIAGNOSIS — I50.42 CHRONIC COMBINED SYSTOLIC AND DIASTOLIC CONGESTIVE HEART FAILURE: Primary | Chronic | ICD-10-CM

## 2022-05-10 DIAGNOSIS — I48.19 PERSISTENT ATRIAL FIBRILLATION: ICD-10-CM

## 2022-05-10 DIAGNOSIS — I34.0 NONRHEUMATIC MITRAL VALVE REGURGITATION: ICD-10-CM

## 2022-05-10 PROBLEM — I48.0 PAROXYSMAL ATRIAL FIBRILLATION (HCC): Status: ACTIVE | Noted: 2021-03-25

## 2022-05-10 PROBLEM — Z79.899 ON AMIODARONE THERAPY: Chronic | Status: ACTIVE | Noted: 2021-04-29

## 2022-05-10 PROBLEM — I48.0 PAROXYSMAL ATRIAL FIBRILLATION (HCC): Chronic | Status: ACTIVE | Noted: 2021-03-25

## 2022-05-10 PROBLEM — Z98.890 HISTORY OF CARDIAC RADIOFREQUENCY ABLATION: Chronic | Status: ACTIVE | Noted: 2021-12-02

## 2022-05-10 PROCEDURE — 99214 OFFICE O/P EST MOD 30 MIN: CPT | Performed by: NURSE PRACTITIONER

## 2022-05-10 RX ORDER — OMEPRAZOLE 20 MG/1
20 CAPSULE, DELAYED RELEASE ORAL DAILY
Status: ON HOLD
Start: 2022-05-10 | End: 2022-07-20 | Stop reason: SDUPTHER

## 2022-05-10 NOTE — PROGRESS NOTES
Subjective:     Encounter Date:05/10/2022      Patient ID: Ryan Sanchez is a 66 y.o. 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      Chief Complaint: CHF follow up   Atrial Fibrillation  Presents for follow-up visit. Symptoms are negative for bradycardia, chest pain, dizziness, hemodynamic instability, hypertension, hypotension, palpitations, shortness of breath, syncope, tachycardia and weakness. (+Weight gain ) The symptoms have been stable. Past medical history includes atrial fibrillation and CHF. There are no medication compliance problems.   Congestive Heart Failure  Presents for follow-up visit. Associated symptoms include unexpected weight change. Pertinent negatives include no abdominal pain, chest pain, chest pressure, edema, fatigue, near-syncope, orthopnea, palpitations, paroxysmal nocturnal dyspnea or shortness of breath. The symptoms have been stable. Compliance with total regimen is %. Compliance with diet is %. Compliance with exercise is %. Compliance with medications is %.     Mr. Sanchez was first seen by our service in consultation in December 2020 for new findings of atrial fibrillation with RVR.  He had presented to the hospital with complaints of shortness of breath and found to be in atrial fibrillation, chronicity unknown at that time.  He had reported at least a month of symptoms including shortness of breath, sharp left-sided chest pain.  During that hospital evaluation the patient had echocardiogram and was found to have a reduced left ventricular systolic function.  His EF was estimated to be 40 to 45% at that time.  Mild mitral regurgitation was noted.  During that hospitalization the patient was started on oral anticoagulation, ACE  "inhibitor, loop diuretic, beta-blocker for management of his systolic heart failure, and medication adjustments for improvement of his heart rates.  During his hospitalization a CT angiogram was ordered of his chest for evaluation of his complaints of shortness of breath.  This revealed a left atrial appendage thrombus.  It was recommended that the patient follow-up after discharge with cardiology, however it does not appear that an appointment was made when he was discharged from the hospital.      Subsequently, the patient presented back to the hospital in March with complaints of shortness of breath and palpitations.  He was evaluated in the emergency department and admitted for further evaluation and care.  The ER note mentions that the patient had been \"converted\" however it appears he was discharged in December in atrial fibrillation without follow-up in between.  It is likely the patient has been in persistent A. fib since that time.  During the hospitalization in March cardiology was consulted due to atrial fibrillation.  The patient had reported his heart pounding and heart palpitations.  He reported easy fatigability and shortness of breath.  He denied chest pain at that time as well.  He had previously been a heavy alcohol drinker and reported cessation of alcohol use but endorsed drinking a significant amount of coffee each day.  When he presented he was started on IV amiodarone due to rapid ventricular response on presentation.  He was converted to oral amiodarone at discharge.  His diltiazem was discontinued.  His Xarelto was continued for anticoagulation use and he was continued on his lisinopril and Toprol-XL at discharge.  He was using furosemide as needed.    He presented to the office for follow-up 4/16/21.  He was in atrial fibrillation on EKG his heart rate was 99.  He reported fatigue.  His shortness of breath was improved.  He does have episodes of shortness of breath generally related to " elevated heart rates.  He reported improvement of his heart rates and shortness of breath after taking an extra dose of his metoprolol at home on a couple occasions since he has been discharged.  He was on anticoagulation with Xarelto, but had not been taking this with his largest meal.  A repeat echocardiogram was obtained during his hospitalization in March.  His left ventricular systolic function has reduced since December (40-45% down to 30-35%).  He was mildly volume overloaded during his hospitalization in March 2021 and received some IV diuretics during that time as well.    He was transitioned from lisinopril to Entresto. His beta blocker dose was increased and he was instructed to monitor weights, and symptoms, and use lasix as needed. He presented on 4/29/21 after initiating Entresto and was doing well from a CHF standpoint. He was still having some fatigue at that time. He was scheduled for outpatient cardioversion.     He was cardioverted successfully with CHANTEL/Cardioversion on 5/5/21.  He felt improved after discharge. He was doing well and noticed and increase in heart rate with slow weight gain within one month afterwards.  He was taking his Entresto and Toprol as prescribed.  He was found to be back in AF on EKG at follow up. He was referred to EP.  He was seen by EP and ablation was recommended. This was scheduled and completed in December 2021.  His CHANTEL noted improvement of his EF from 31-35% to 46-50%.     He is doing relatively well since his ablation.  He was seen by Dr. Monteiro in February of this year.  A repeat echocardiogram was ordered at that time to further evaluate his EF after stabilization of his heart rates and rhythm.  There was mention of possible discontinuation of amiodarone if improvement of EF, per electrophysiology. His mitral valve on CHANTEL had trace to mild regurgitation, previously noted to be moderate, and now without valvular abnormalities noted on most recent echo.   Functional mitral regurgitation now resolved.    He completed his echo last month.  LVEF has normalized.  The patient has felt well.  He denies any significant fatigue, shortness of breath, orthopnea, PND.  He estimates using Lasix for a couple of days approximately 1 month ago with improvement of symptoms.  He is compliant with Entresto, Toprol XL, Xarelto, amiodarone.  He has recently had some low back pain and is being treated for sciatica.  He admits to increased use of NSAIDs and has been taking his anticoagulation every other day on his own.  He reports no rapid weight gain, lower extremity swelling, abdominal bloating.  He admits to ongoing alcohol use, but limited to weekends only.      The following portions of the patient's history were reviewed and updated as appropriate: allergies, current medications, past family history, past medical history, past social history and past surgical history.     No Known Allergies      Current Outpatient Medications:   •  acetaminophen (TYLENOL) 325 MG tablet, Take 2 tablets by mouth Every 4 (Four) Hours As Needed for Mild Pain ., Disp: 30 tablet, Rfl: 0  •  amiodarone (PACERONE) 200 MG tablet, Take 1 tablet by mouth Daily., Disp: , Rfl:   •  celecoxib (CeleBREX) 200 MG capsule, Take 200 mg by mouth As Needed., Disp: , Rfl:   •  furosemide (Lasix) 40 MG tablet, Take 1 tablet by mouth Daily As Needed (Weight gain, shortness of breath, swelling)., Disp: 30 tablet, Rfl: 3  •  loratadine (CLARITIN) 10 MG tablet, Take 10 mg by mouth As Needed., Disp: , Rfl:   •  metFORMIN (GLUCOPHAGE) 500 MG tablet, Take 500 mg by mouth 2 (Two) Times a Day With Meals., Disp: , Rfl:   •  metoprolol succinate XL (TOPROL-XL) 50 MG 24 hr tablet, Take 2 tablets by mouth Daily., Disp: 60 tablet, Rfl: 11  •  rivaroxaban (XARELTO) 20 MG tablet, Take 20 mg by mouth Daily., Disp: , Rfl:   •  sacubitril-valsartan (ENTRESTO) 24-26 MG tablet, Take 1 tablet by mouth 2 (Two) Times a Day., Disp: 60 tablet,  "Rfl: 11  •  omeprazole (priLOSEC) 20 MG capsule, Take 1 capsule by mouth Daily., Disp: , Rfl:       Review of Systems   Constitutional: Positive for unexpected weight change. Negative for chills, diaphoresis, fatigue, fever, malaise/fatigue and weight gain.   Cardiovascular: Negative for chest pain, dyspnea on exertion, irregular heartbeat, leg swelling, near-syncope, orthopnea, palpitations, paroxysmal nocturnal dyspnea and syncope.   Respiratory: Negative for cough, hemoptysis, shortness of breath, sputum production and wheezing.    Hematologic/Lymphatic: Negative for bleeding problem. Bruises/bleeds easily.   Musculoskeletal: Positive for back pain and joint pain.   Gastrointestinal: Negative for bloating, abdominal pain, melena, nausea and vomiting.   Genitourinary: Negative for hematuria.   Neurological: Negative for dizziness, focal weakness, headaches, light-headedness and weakness.   Psychiatric/Behavioral: Negative for altered mental status.       Procedures     /84 (BP Location: Right arm, Patient Position: Sitting, Cuff Size: Adult)   Pulse 80   Resp 18   Ht 170.2 cm (67\")   Wt 76.2 kg (168 lb)   SpO2 98%   BMI 26.31 kg/m²        Objective:     Vitals reviewed.   Constitutional:       General: Awake. Not in acute distress.     Appearance: Well-developed, well-groomed, overweight and not in distress. Chronically ill-appearing. Not ill-appearing.   HENT:      Head: Normocephalic and atraumatic.   Pulmonary:      Effort: Pulmonary effort is normal.      Breath sounds: Normal breath sounds. No wheezing. No rhonchi. No rales.   Cardiovascular:      Normal rate. Regular rhythm.      Murmurs: There is no murmur.      No gallop. No rub.   Edema:     Peripheral edema absent.   Abdominal:      General: Bowel sounds are normal.   Musculoskeletal:      Cervical back: Normal range of motion and neck supple. Skin:     General: Skin is warm and dry.   Neurological:      Mental Status: Alert, oriented to " person, place, and time and oriented to person, place and time.   Psychiatric:         Attention and Perception: Attention normal.         Mood and Affect: Mood and affect normal.         Speech: Speech normal.         Behavior: Behavior normal. Behavior is cooperative.         Thought Content: Thought content normal.         Cognition and Memory: Cognition and memory normal.       Lab Review:    Adult Transthoracic Echo Complete W/ Cont if Necessary Per Protocol  04/01/2022  Interpretation Summary  · Left ventricular systolic function is normal. Left ventricular ejection fraction appears to be 56 - 60%.  · Left ventricular wall thickness is consistent with mild septal asymmetric hypertrophy.  · Normal right ventricular cavity size and systolic function noted.  · No significant valvular abnormalities identified on this study.    12/02/2021 EP Study/Ablation  Notes:  Pulmonary vein isolation successfully achieved by cryo- balloon ablation, with inclusion of the posterior wall, CTI line placement, and spontaneous termination of AF. Dr. Derrick Monteiro     Interpretation Summary  TTE 5/5/2021  · Left ventricular systolic function is mildly decreased. Left ventricular ejection fraction appears to be 46 - 50%.  · No evidence of a left atrial appendage thrombus.  · Trace to mild mitral valve regurgitation is present.     Interpretation Summary  TTE 3/26/21  · Left ventricular ejection fraction appears to be 31 - 35%. Left ventricular systolic function is moderately decreased.  · Left ventricular diastolic function is consistent with (grade II w/high LAP) pseudonormalization.  · The left atrial cavity is mildly dilated.  · Mitral annular calcification is present. Moderate mitral valve regurgitation is present. No significant mitral valve stenosis is present.  · No evidence of pulmonary hypertension is present.    CT angiogram chest: 12/26/2020-impression relates left atrial appendage thrombus.          Assessment:           Diagnosis Plan   1. Chronic combined systolic and diastolic congestive heart failure (HCC)     2. Dilated cardiomyopathy (HCC)     3. Persistent atrial fibrillation (HCC)     4. Nonrheumatic mitral valve regurgitation     5. Chronic anticoagulation            Plan:       -Chronic combined systolic and diastolic congestive heart failure: Stable.  NYHA class II.  Initially when he was hospitalized, his EF was 40 to 45% (December 2020) and then dropped to 30 to 35% (March 2021).  He was transitioned from Lisinopril to Entresto.  He is on Toprol-XL.  He has lasix at home for PRN.  His follow-up CHANTEL, prior to his cardioversion in May 2021, revealed improvement of his LVEF to 45 to 50%.  Continue current GDMT for management of CHF.   Monitor weights at home.  Continue as needed Lasix at home.  He has been stable with using diuretic therapy on an as needed basis.  He had recent echo, referenced above which shows normalization of LVEF at 56 to 60%.  Continue guideline directed medical therapy without any changes.    -Dilated cardiomyopathy: He had a mildly dilated left ventricle noted on echocardiogram with reduced LVEF of 30-35% which was performed in March 2021.  He required CHANTEL prior to his cardioversion, Left ventricular ejection fraction at that time estimated to be 45 to 50%.  At the time of diagnosis it was felt that his dilated cardiomyopathy may be due to to alcohol abuse or tachycardia mediated.  He has not had an ischemic work-up recently.  He did have a dobutamine stress echo in April 2019 ordered by his primary care office for complaints of shortness of breath.  He is stable now without any complaints and has had normalization of his LVEF.  Given that improvement in the patient being asymptomatic no further ischemic work-up is planned at this time.  Of note, the patient had quit drinking after his previous hospitalization last year but does admit to drinking alcohol once again.  He reports he only drinks Friday  Saturday Sunday and does not drink throughout the workweek.    -Atrial fibrillation: He is now status post ablation per Dr. Monteiro in December 2021. The patient denies any palpitations.  He had improvement of fatigue, shortness of breath, palpitations with ablation.  He remains anticoagulated and is on amiodarone daily.  He likely will have adjustments made to his amiodarone by electrophysiology as there was reference to this in the last office visit if his LVEF had improved.  Continue current medications including anticoagulation, antiarrhythmic, beta-blocker therapies.  Follow-up with Dr. Monteiro as scheduled in August.    -Chronic anticoagulation: Initiated in December 2020.  Continue uninterrupted use of Xarelto for stroke risk reduction in the setting of atrial fibrillation.  The patient has been taking this every other day due to his increased use of NSAIDs for treatment of low back pain.  He currently has acute sciatica and has been taking NSAIDs at the recommendation of his provider.  We discussed ongoing use of uninterrupted anticoagulation for stroke risk reduction.  We also discussed increased risk of bleeding with alcohol abuse.  I did recommend if the patient is going to be on short-term use of NSAIDs to start a PPI such as omeprazole.        Continue current medications including Entresto, Toprol-XL, amiodarone, Xarelto.  Recommend uninterrupted use of anticoagulant therapy.  Would recommend use of medication such as omeprazole in the setting of short-term use of NSAIDs for treatment of low back pain.  Continue with daily weights, low-sodium diet.  Alcohol consumption monitoring advised.    Routine CHF follow-up in 3 months.          I attest that all portions of this note have been reviewed and updated to reflect the patient's current status.

## 2022-06-22 ENCOUNTER — OFFICE VISIT (OUTPATIENT)
Dept: GASTROENTEROLOGY | Facility: CLINIC | Age: 67
End: 2022-06-22

## 2022-06-22 VITALS
BODY MASS INDEX: 26.31 KG/M2 | HEART RATE: 84 BPM | DIASTOLIC BLOOD PRESSURE: 78 MMHG | TEMPERATURE: 97.1 F | SYSTOLIC BLOOD PRESSURE: 128 MMHG | HEIGHT: 67 IN | OXYGEN SATURATION: 96 %

## 2022-06-22 DIAGNOSIS — Z80.0 FAMILY HX OF COLON CANCER: ICD-10-CM

## 2022-06-22 DIAGNOSIS — R13.19 ESOPHAGEAL DYSPHAGIA: Primary | ICD-10-CM

## 2022-06-22 DIAGNOSIS — Z86.010 HX OF ADENOMATOUS COLONIC POLYPS: ICD-10-CM

## 2022-06-22 DIAGNOSIS — Z79.01 ANTICOAGULATED: ICD-10-CM

## 2022-06-22 PROCEDURE — 99214 OFFICE O/P EST MOD 30 MIN: CPT | Performed by: CLINICAL NURSE SPECIALIST

## 2022-06-22 NOTE — PROGRESS NOTES
Ryan Sanchez  1955 6/22/2022  Chief Complaint   Patient presents with   • GI Problem     Problems swallowing     Subjective   HPI  Ryan Sanchez is a 66 y.o. male who presents with a complaint of dysphagia. Recurrent for him approx 3 weeks ago with solids or meat/bread. He says it is mid esophageal region. No nausea or vomiting. No change in bowels. No wt loss. No fever chills or sweats. No abdominal pain. He has a hx of colonoscopy with hx of colon polyp removed.   Positive family hx for colon cancer.    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   • Melanoma (HCC)    • Mitral valve prolapse    • PONV (postoperative nausea and vomiting)      Past Surgical History:   Procedure Laterality Date   • ABLATION OF DYSRHYTHMIC FOCUS      12/02/2021EP Study/AF Ablation   • BLADDER NECK CONTRACTURE REPAIR N/A 12/02/2016    Procedure: BLADDER NECK CONTRACTURE REPAIR;  Surgeon: Ryan Lewis MD;  Location:  [facility] OR;  Service:    • CARDIOVERSION     • COLONOSCOPY W/ POLYPECTOMY  09/10/2015    Tubular adenoma at 30 cm repeat exam in 5 years   • CYSTOSCOPY BLADDER STONE LITHOTRIPSY  2011   • CYSTOSCOPY W/ URETERAL STENT REMOVAL N/A 12/02/2016    Procedure: CYSTOLITHOLAPAXY WITH LASER and TRANURETHRAL INCISION OF BLADDER NECK CONTRACTURE;  Surgeon: Ryan Lewis MD;  Location:  [facility] OR;  Service:    • ENDOSCOPY  07/14/2014    HH, Reflux esophagitis   • ENDOSCOPY N/A 10/05/2017    LA Grade C reflux esophagitis repeat exam in 3 months   • ENDOSCOPY N/A 01/02/2018    LA Grade B reflux esophagitis negative for Intestinal Metaplasia   • ROOT CANAL Left    • VASECTOMY          Outpatient Medications Marked as Taking for the 6/22/22 encounter (Office Visit) with Ann Mccormick APRN   Medication Sig Dispense Refill   • acetaminophen (TYLENOL) 325 MG tablet Take 2 tablets by mouth Every 4 (Four) Hours As Needed for Mild Pain . 30 tablet 0   • amiodarone (PACERONE) 200 MG tablet Take 1 tablet by mouth Daily.     • celecoxib (CeleBREX) 200 MG capsule Take 200 mg by mouth As Needed.     • furosemide (Lasix) 40 MG tablet Take 1 tablet by mouth Daily As Needed (Weight gain, shortness of breath, swelling). 30 tablet 3   • loratadine (CLARITIN) 10 MG tablet Take 10 mg by mouth As Needed.     • metoprolol succinate XL (TOPROL-XL) 50 MG 24 hr tablet Take 2 tablets by mouth Daily. 60 tablet 11   • omeprazole (priLOSEC) 20 MG capsule Take 1 capsule by mouth Daily.     • rivaroxaban (XARELTO) 20 MG tablet Take 20 mg by mouth Daily.     • sacubitril-valsartan (ENTRESTO) 24-26 MG tablet Take 1 tablet by mouth 2 (Two) Times a Day. 60 tablet 11     No Known Allergies  Social History     Socioeconomic History   • Marital status: Single   Tobacco Use   • Smoking status: Never Smoker   • Smokeless tobacco: Never Used   Vaping Use   • Vaping Use: Never used   Substance and Sexual Activity   • Alcohol use: Yes     Alcohol/week: 14.0 standard drinks     Types: 14 Cans of beer per week   • Drug use: No   • Sexual activity: Defer     Family History   Problem Relation Age of Onset   • No Known Problems Mother    • No Known Problems Father    • Colon cancer Maternal Grandfather    • Colon polyps Neg Hx      Health Maintenance   Topic Date Due   • URINE MICROALBUMIN  Never done   • COVID-19 Vaccine (1) Never done   • Pneumococcal Vaccine 65+ (1 - PCV) Never done   • ZOSTER VACCINE (1 of 2) Never done   • HEPATITIS C SCREENING  Never done   • ANNUAL WELLNESS VISIT  Never done   • DIABETIC FOOT EXAM  Never done   • DIABETIC EYE EXAM  Never done   • HEMOGLOBIN A1C  09/26/2021   • INFLUENZA VACCINE   "10/01/2022   • TDAP/TD VACCINES (2 - Tdap) 07/24/2024   • COLORECTAL CANCER SCREENING  09/10/2025     Review of Systems   Constitutional: Negative for activity change, appetite change, chills, diaphoresis, fatigue, fever and unexpected weight change.   HENT: Positive for trouble swallowing. Negative for ear pain, hearing loss, mouth sores, sore throat and voice change.    Eyes: Negative.    Respiratory: Negative for cough, choking, shortness of breath and wheezing.    Cardiovascular: Negative for chest pain and palpitations.   Gastrointestinal: Negative for abdominal pain, blood in stool, constipation, diarrhea, nausea and vomiting.   Endocrine: Negative for cold intolerance and heat intolerance.   Genitourinary: Negative for decreased urine volume, dysuria, frequency, hematuria and urgency.   Musculoskeletal: Negative for back pain, gait problem and myalgias.   Skin: Negative for color change, pallor and rash.   Allergic/Immunologic: Negative for food allergies and immunocompromised state.   Neurological: Negative for dizziness, tremors, seizures, syncope, weakness, light-headedness, numbness and headaches.   Hematological: Negative for adenopathy. Does not bruise/bleed easily.   Psychiatric/Behavioral: Negative for agitation and confusion. The patient is not nervous/anxious.    All other systems reviewed and are negative.    Objective   Vitals:    06/22/22 0850   BP: 128/78   Pulse: 84   Temp: 97.1 °F (36.2 °C)   SpO2: 96%   Height: 170.2 cm (67\")     Body mass index is 26.31 kg/m².  Physical Exam  Constitutional:       Appearance: He is well-developed.   HENT:      Head: Normocephalic and atraumatic.   Eyes:      Pupils: Pupils are equal, round, and reactive to light.   Neck:      Trachea: No tracheal deviation.   Cardiovascular:      Rate and Rhythm: Normal rate and regular rhythm.      Heart sounds: Normal heart sounds. No murmur heard.    No friction rub. No gallop.   Pulmonary:      Effort: Pulmonary effort " is normal. No respiratory distress.      Breath sounds: Normal breath sounds. No wheezing or rales.   Chest:      Chest wall: No tenderness.   Abdominal:      General: Bowel sounds are normal. There is no distension.      Palpations: Abdomen is soft. Abdomen is not rigid.      Tenderness: There is no abdominal tenderness. There is no guarding or rebound.   Musculoskeletal:         General: No tenderness or deformity. Normal range of motion.      Cervical back: Normal range of motion and neck supple.   Skin:     General: Skin is warm and dry.      Coloration: Skin is not pale.      Findings: No rash.   Neurological:      Mental Status: He is alert and oriented to person, place, and time.      Deep Tendon Reflexes: Reflexes are normal and symmetric.   Psychiatric:         Behavior: Behavior normal.         Thought Content: Thought content normal.         Judgment: Judgment normal.       Assessment & Plan   Diagnoses and all orders for this visit:    1. Esophageal dysphagia (Primary)  -     Case Request; Standing  -     COVID PRE-OP / PRE-PROCEDURE SCREENING ORDER (NO ISOLATION) - Swab, Nasal Cavity; Future  -     Follow Anesthesia Guidelines / Standing Orders; Future  -     Obtain Informed Consent; Future  -     Case Request  -     polyethylene glycol (GoLYTELY) 236 g solution; Take as directed by office instructions.  Dispense: 4000 mL; Refill: 0    2. Hx of adenomatous colonic polyps    3. Anticoagulated  Comments:  Xaretlo to hold per Dr Coto he takes due to hx of ablation/afib    4. Family hx of colon cancer      ESOPHAGOGASTRODUODENOSCOPY WITH ANESTHESIA (N/A), COLONOSCOPY WITH ANESTHESIA (N/A)  Part of this note may be an electronic transcription/translation of spoken language to printed text using the Dragon Dictation System.  Body mass index is 26.31 kg/m².  No follow-ups on file.    BMI is >= 25 and <30. (Overweight) The following options were offered after discussion;: weight loss educational material  (shared in after visit summary)      All risks, benefits, alternatives, and indications of colonoscopy and/or Endoscopy procedure have been discussed with the patient. Risks to include perforation of the colon requiring possible surgery or colostomy, risk of bleeding from biopsies or removal of colon tissue, possibility of missing a colon polyp or cancer, or adverse drug reaction.  Benefits to include the diagnosis and management of disease of the colon and rectum. Alternatives to include barium enema, radiographic evaluation, lab testing or no intervention. Pt verbalizes understanding and agrees.     Ann Mccormick, APRN  6/22/2022  09:04 CDT          If you smoke or use tobacco, 4 minutes reading provided  Steps to Quit Smoking  Smoking tobacco can be harmful to your health and can affect almost every organ in your body. Smoking puts you, and those around you, at risk for developing many serious chronic diseases. Quitting smoking is difficult, but it is one of the best things that you can do for your health. It is never too late to quit.  What are the benefits of quitting smoking?  When you quit smoking, you lower your risk of developing serious diseases and conditions, such as:  · Lung cancer or lung disease, such as COPD.  · Heart disease.  · Stroke.  · Heart attack.  · Infertility.  · Osteoporosis and bone fractures.  Additionally, symptoms such as coughing, wheezing, and shortness of breath may get better when you quit. You may also find that you get sick less often because your body is stronger at fighting off colds and infections. If you are pregnant, quitting smoking can help to reduce your chances of having a baby of low birth weight.  How do I get ready to quit?  When you decide to quit smoking, create a plan to make sure that you are successful. Before you quit:  · Pick a date to quit. Set a date within the next two weeks to give you time to prepare.  · Write down the reasons why you are quitting.  Keep this list in places where you will see it often, such as on your bathroom mirror or in your car or wallet.  · Identify the people, places, things, and activities that make you want to smoke (triggers) and avoid them. Make sure to take these actions:  ¨ Throw away all cigarettes at home, at work, and in your car.  ¨ Throw away smoking accessories, such as ashtrays and lighters.  ¨ Clean your car and make sure to empty the ashtray.  ¨ Clean your home, including curtains and carpets.  · Tell your family, friends, and coworkers that you are quitting. Support from your loved ones can make quitting easier.  · Talk with your health care provider about your options for quitting smoking.  · Find out what treatment options are covered by your health insurance.  What strategies can I use to quit smoking?  Talk with your healthcare provider about different strategies to quit smoking. Some strategies include:  · Quitting smoking altogether instead of gradually lessening how much you smoke over a period of time. Research shows that quitting “cold turkey” is more successful than gradually quitting.  · Attending in-person counseling to help you build problem-solving skills. You are more likely to have success in quitting if you attend several counseling sessions. Even short sessions of 10 minutes can be effective.  · Finding resources and support systems that can help you to quit smoking and remain smoke-free after you quit. These resources are most helpful when you use them often. They can include:  ¨ Online chats with a counselor.  ¨ Telephone quitlines.  ¨ Printed self-help materials.  ¨ Support groups or group counseling.  ¨ Text messaging programs.  ¨ Mobile phone applications.  · Taking medicines to help you quit smoking. (If you are pregnant or breastfeeding, talk with your health care provider first.) Some medicines contain nicotine and some do not. Both types of medicines help with cravings, but the medicines that  include nicotine help to relieve withdrawal symptoms. Your health care provider may recommend:  ¨ Nicotine patches, gum, or lozenges.  ¨ Nicotine inhalers or sprays.  ¨ Non-nicotine medicine that is taken by mouth.  Talk with your health care provider about combining strategies, such as taking medicines while you are also receiving in-person counseling. Using these two strategies together makes you more likely to succeed in quitting than if you used either strategy on its own.  If you are pregnant or breastfeeding, talk with your health care provider about finding counseling or other support strategies to quit smoking. Do not take medicine to help you quit smoking unless told to do so by your health care provider.  What things can I do to make it easier to quit?  Quitting smoking might feel overwhelming at first, but there is a lot that you can do to make it easier. Take these important actions:  · Reach out to your family and friends and ask that they support and encourage you during this time. Call telephone quitlines, reach out to support groups, or work with a counselor for support.  · Ask people who smoke to avoid smoking around you.  · Avoid places that trigger you to smoke, such as bars, parties, or smoke-break areas at work.  · Spend time around people who do not smoke.  · Lessen stress in your life, because stress can be a smoking trigger for some people. To lessen stress, try:  ¨ Exercising regularly.  ¨ Deep-breathing exercises.  ¨ Yoga.  ¨ Meditating.  ¨ Performing a body scan. This involves closing your eyes, scanning your body from head to toe, and noticing which parts of your body are particularly tense. Purposefully relax the muscles in those areas.  · Download or purchase mobile phone or tablet apps (applications) that can help you stick to your quit plan by providing reminders, tips, and encouragement. There are many free apps, such as QuitGuide from the CDC (Centers for Disease Control and  Prevention). You can find other support for quitting smoking (smoking cessation) through smokefree.gov and other websites.  How will I feel when I quit smoking?  Within the first 24 hours of quitting smoking, you may start to feel some withdrawal symptoms. These symptoms are usually most noticeable 2-3 days after quitting, but they usually do not last beyond 2-3 weeks. Changes or symptoms that you might experience include:  · Mood swings.  · Restlessness, anxiety, or irritation.  · Difficulty concentrating.  · Dizziness.  · Strong cravings for sugary foods in addition to nicotine.  · Mild weight gain.  · Constipation.  · Nausea.  · Coughing or a sore throat.  · Changes in how your medicines work in your body.  · A depressed mood.  · Difficulty sleeping (insomnia).  After the first 2-3 weeks of quitting, you may start to notice more positive results, such as:  · Improved sense of smell and taste.  · Decreased coughing and sore throat.  · Slower heart rate.  · Lower blood pressure.  · Clearer skin.  · The ability to breathe more easily.  · Fewer sick days.  Quitting smoking is very challenging for most people. Do not get discouraged if you are not successful the first time. Some people need to make many attempts to quit before they achieve long-term success. Do your best to stick to your quit plan, and talk with your health care provider if you have any questions or concerns.  This information is not intended to replace advice given to you by your health care provider. Make sure you discuss any questions you have with your health care provider.  Document Released: 12/12/2002 Document Revised: 08/15/2017 Document Reviewed: 05/03/2016  Elsevier Interactive Patient Education © 2017 [employer]

## 2022-06-30 NOTE — PROGRESS NOTES
This patient takes Xarelto due to history of atrial fibrillation.  It would be reasonably safe to discontinue the medication for the time requested without the need for Lovenox bridging.  Resume the medication when feasible after the procedure.

## 2022-07-01 ENCOUNTER — TELEPHONE (OUTPATIENT)
Dept: GASTROENTEROLOGY | Facility: CLINIC | Age: 67
End: 2022-07-01

## 2022-07-01 NOTE — TELEPHONE ENCOUNTER
----- Message from Juan Daniel Coto MD sent at 6/30/2022  2:06 PM CDT -----    This patient takes Xarelto due to history of atrial fibrillation.  It would be reasonably safe to discontinue the medication for the time requested without the need for Lovenox bridging.  Resume the medication when feasible after the procedure.  ----- Message -----  From: Etienne Weeks MA  Sent: 6/22/2022   2:41 PM CDT  To: Juan Daniel Coto MD                                                                                                                                                                                          [address], [address]  [facility] [address]  Phone: [phone]  Fax: [phone]               June 22, 2022               Dear Dr. Coto,      Ryan Sanchez 1955 is being considered for EGD and colonoscopy for dysphagia/polyps. Ryan Sanchez is tentatively scheduled for 7/20/22.  In order to do this procedure, we will need the patient to be off of Xarelto for 2 days.       If this patient will need Lovenox therapy while off regularly prescribed anticoagulation therapy we ask you please arrange this with the patient.     Lovenox Requirement  Yes/No     Other:           Please specify patient’s risk of cardiac complications during perioperative period by _____% or  LOW  / MEDIUM  /  HIGH RISK.     Approved By:     Date:                 Thank you,            Joshua Zuleta M.D.     Glasses Rx given.

## 2022-07-18 ENCOUNTER — LAB (OUTPATIENT)
Dept: LAB | Facility: HOSPITAL | Age: 67
End: 2022-07-18

## 2022-07-18 DIAGNOSIS — R13.19 ESOPHAGEAL DYSPHAGIA: ICD-10-CM

## 2022-07-18 LAB — SARS-COV-2 ORF1AB RESP QL NAA+PROBE: NOT DETECTED

## 2022-07-18 PROCEDURE — U0004 COV-19 TEST NON-CDC HGH THRU: HCPCS

## 2022-07-18 PROCEDURE — C9803 HOPD COVID-19 SPEC COLLECT: HCPCS

## 2022-07-20 ENCOUNTER — PREP FOR SURGERY (OUTPATIENT)
Dept: GASTROENTEROLOGY | Facility: CLINIC | Age: 67
End: 2022-07-20

## 2022-07-20 ENCOUNTER — ANESTHESIA (OUTPATIENT)
Dept: GASTROENTEROLOGY | Facility: HOSPITAL | Age: 67
End: 2022-07-20

## 2022-07-20 ENCOUNTER — ANESTHESIA EVENT (OUTPATIENT)
Dept: GASTROENTEROLOGY | Facility: HOSPITAL | Age: 67
End: 2022-07-20

## 2022-07-20 ENCOUNTER — HOSPITAL ENCOUNTER (OUTPATIENT)
Facility: HOSPITAL | Age: 67
Setting detail: HOSPITAL OUTPATIENT SURGERY
Discharge: HOME | End: 2022-07-20
Attending: INTERNAL MEDICINE | Admitting: INTERNAL MEDICINE

## 2022-07-20 VITALS
TEMPERATURE: 97.6 F | OXYGEN SATURATION: 94 % | RESPIRATION RATE: 17 BRPM | WEIGHT: 170 LBS | DIASTOLIC BLOOD PRESSURE: 76 MMHG | HEIGHT: 67 IN | SYSTOLIC BLOOD PRESSURE: 119 MMHG | BODY MASS INDEX: 26.68 KG/M2 | HEART RATE: 76 BPM

## 2022-07-20 DIAGNOSIS — R13.19 ESOPHAGEAL DYSPHAGIA: ICD-10-CM

## 2022-07-20 DIAGNOSIS — R13.19 ESOPHAGEAL DYSPHAGIA: Primary | ICD-10-CM

## 2022-07-20 LAB — GLUCOSE BLDC GLUCOMTR-MCNC: 133 MG/DL (ref 70–130)

## 2022-07-20 PROCEDURE — 82962 GLUCOSE BLOOD TEST: CPT

## 2022-07-20 PROCEDURE — G0105 COLORECTAL SCRN; HI RISK IND: HCPCS | Performed by: INTERNAL MEDICINE

## 2022-07-20 PROCEDURE — 43239 EGD BIOPSY SINGLE/MULTIPLE: CPT | Performed by: INTERNAL MEDICINE

## 2022-07-20 PROCEDURE — 88305 TISSUE EXAM BY PATHOLOGIST: CPT | Performed by: INTERNAL MEDICINE

## 2022-07-20 PROCEDURE — 25010000002 PROPOFOL 10 MG/ML EMULSION: Performed by: NURSE ANESTHETIST, CERTIFIED REGISTERED

## 2022-07-20 RX ORDER — SODIUM CHLORIDE 0.9 % (FLUSH) 0.9 %
10 SYRINGE (ML) INJECTION AS NEEDED
Status: DISCONTINUED | OUTPATIENT
Start: 2022-07-20 | End: 2022-07-20 | Stop reason: HOSPADM

## 2022-07-20 RX ORDER — PROPOFOL 10 MG/ML
VIAL (ML) INTRAVENOUS AS NEEDED
Status: DISCONTINUED | OUTPATIENT
Start: 2022-07-20 | End: 2022-07-20 | Stop reason: SURG

## 2022-07-20 RX ORDER — LIDOCAINE HYDROCHLORIDE 10 MG/ML
0.5 INJECTION, SOLUTION EPIDURAL; INFILTRATION; INTRACAUDAL; PERINEURAL ONCE AS NEEDED
Status: DISCONTINUED | OUTPATIENT
Start: 2022-07-20 | End: 2022-07-20 | Stop reason: HOSPADM

## 2022-07-20 RX ORDER — SODIUM CHLORIDE 9 MG/ML
500 INJECTION, SOLUTION INTRAVENOUS CONTINUOUS PRN
Status: DISCONTINUED | OUTPATIENT
Start: 2022-07-20 | End: 2022-07-20 | Stop reason: HOSPADM

## 2022-07-20 RX ORDER — OMEPRAZOLE 20 MG/1
20 CAPSULE, DELAYED RELEASE ORAL
Qty: 180 CAPSULE | Refills: 3 | Status: SHIPPED | OUTPATIENT
Start: 2022-07-20 | End: 2022-10-18

## 2022-07-20 RX ORDER — LIDOCAINE HYDROCHLORIDE 20 MG/ML
INJECTION, SOLUTION EPIDURAL; INFILTRATION; INTRACAUDAL; PERINEURAL AS NEEDED
Status: DISCONTINUED | OUTPATIENT
Start: 2022-07-20 | End: 2022-07-20 | Stop reason: SURG

## 2022-07-20 RX ADMIN — PROPOFOL 50 MG: 10 INJECTION, EMULSION INTRAVENOUS at 09:47

## 2022-07-20 RX ADMIN — PROPOFOL 50 MG: 10 INJECTION, EMULSION INTRAVENOUS at 09:49

## 2022-07-20 RX ADMIN — PROPOFOL 50 MG: 10 INJECTION, EMULSION INTRAVENOUS at 09:52

## 2022-07-20 RX ADMIN — SODIUM CHLORIDE 500 ML: 9 INJECTION, SOLUTION INTRAVENOUS at 09:12

## 2022-07-20 RX ADMIN — LIDOCAINE HYDROCHLORIDE 60 MG: 20 INJECTION, SOLUTION EPIDURAL; INFILTRATION; INTRACAUDAL; PERINEURAL at 09:43

## 2022-07-20 RX ADMIN — PROPOFOL 100 MG: 10 INJECTION, EMULSION INTRAVENOUS at 09:44

## 2022-07-20 NOTE — ANESTHESIA POSTPROCEDURE EVALUATION
Patient: Ryan DUARTE    Procedure Summary     Date: 07/20/22 Room / Location: [facility] / [facility]    Anesthesia Start: 0940 Anesthesia Stop: 1008    Procedures:       ESOPHAGOGASTRODUODENOSCOPY WITH ANESTHESIA (N/A )      COLONOSCOPY WITH ANESTHESIA (N/A ) Diagnosis:       Esophageal dysphagia      (Esophageal dysphagia [R13.19])    Surgeons: Joshua Zuleta MD Provider: Sumaya Marrero CRNA    Anesthesia Type: MAC ASA Status: 3          Anesthesia Type: MAC    Vitals  Vitals Value Taken Time   /81 07/20/22 1009   Temp     Pulse 79 07/20/22 1010   Resp     SpO2 95 % 07/20/22 1010   Vitals shown include unvalidated device data.        Post Anesthesia Care and Evaluation    Patient location during evaluation: PHASE II  Patient participation: complete - patient participated  Level of consciousness: awake and alert  Pain score: 0  Pain management: adequate    Airway patency: patent  Anesthetic complications: No anesthetic complications  PONV Status: none  Cardiovascular status: acceptable  Respiratory status: acceptable  Hydration status: acceptable  No anesthesia care post op

## 2022-07-20 NOTE — ANESTHESIA PREPROCEDURE EVALUATION
Anesthesia Evaluation     history of anesthetic complications: PONV  NPO Solid Status: > 8 hours  NPO Liquid Status: > 8 hours           Airway   Mallampati: II  No difficulty expected  Dental      Pulmonary    (-) asthma, recent URI, sleep apnea, not a smoker  Cardiovascular   Exercise tolerance: excellent (>7 METS)    ECG reviewed  PT is on anticoagulation therapy    (+) hypertension well controlled, valvular problems/murmurs (diagnosed 20 years ago, no issues) MVP, dysrhythmias (s/p ablation) Atrial Fib, CHF Diastolic >=55% and Systolic <55%,   (-) pacemaker, past MI, angina, cardiac stents    ROS comment: 4/2022  · Left ventricular systolic function is normal. Left ventricular ejection fraction appears to be 56 - 60%.  · Left ventricular wall thickness is consistent with mild septal asymmetric hypertrophy.  · Normal right ventricular cavity size and systolic function noted.  · No significant valvular abnormalities identified on this study.        Neuro/Psych  (-) seizures, TIA, CVA  GI/Hepatic/Renal/Endo    (+)  GERD well controlled,  diabetes mellitus,   (-) liver disease, no renal disease    Musculoskeletal     Abdominal    Substance History      OB/GYN          Other   arthritis,                        Anesthesia Plan    ASA 3     MAC     intravenous induction     Anesthetic plan, risks, benefits, and alternatives have been provided, discussed and informed consent has been obtained with: patient.

## 2022-07-21 LAB
CYTO UR: NORMAL
LAB AP CASE REPORT: NORMAL
Lab: NORMAL
PATH REPORT.FINAL DX SPEC: NORMAL
PATH REPORT.GROSS SPEC: NORMAL

## 2022-07-26 RX ORDER — AMIODARONE HYDROCHLORIDE 200 MG/1
100 TABLET ORAL DAILY
Qty: 30 TABLET | Refills: 11 | Status: SHIPPED | OUTPATIENT
Start: 2022-07-26 | End: 2022-08-17 | Stop reason: DRUGHIGH

## 2022-08-17 ENCOUNTER — OFFICE VISIT (OUTPATIENT)
Dept: CARDIOLOGY | Facility: CLINIC | Age: 67
End: 2022-08-17

## 2022-08-17 VITALS
BODY MASS INDEX: 27 KG/M2 | WEIGHT: 172 LBS | SYSTOLIC BLOOD PRESSURE: 125 MMHG | HEART RATE: 71 BPM | OXYGEN SATURATION: 98 % | DIASTOLIC BLOOD PRESSURE: 75 MMHG | HEIGHT: 67 IN | RESPIRATION RATE: 18 BRPM

## 2022-08-17 DIAGNOSIS — I50.42 CHRONIC COMBINED SYSTOLIC AND DIASTOLIC CONGESTIVE HEART FAILURE: Primary | Chronic | ICD-10-CM

## 2022-08-17 DIAGNOSIS — Z79.01 CHRONIC ANTICOAGULATION: Chronic | ICD-10-CM

## 2022-08-17 DIAGNOSIS — F10.10 ETOH ABUSE: ICD-10-CM

## 2022-08-17 DIAGNOSIS — R07.89 CHEST PAIN, ATYPICAL: ICD-10-CM

## 2022-08-17 DIAGNOSIS — I48.19 PERSISTENT ATRIAL FIBRILLATION: Chronic | ICD-10-CM

## 2022-08-17 DIAGNOSIS — I42.0 DILATED CARDIOMYOPATHY: Chronic | ICD-10-CM

## 2022-08-17 PROCEDURE — 99214 OFFICE O/P EST MOD 30 MIN: CPT | Performed by: NURSE PRACTITIONER

## 2022-08-17 PROCEDURE — 93000 ELECTROCARDIOGRAM COMPLETE: CPT | Performed by: NURSE PRACTITIONER

## 2022-08-17 RX ORDER — METOPROLOL SUCCINATE 50 MG/1
100 TABLET, EXTENDED RELEASE ORAL NIGHTLY
COMMUNITY
End: 2022-11-28

## 2022-08-17 RX ORDER — AMIODARONE HYDROCHLORIDE 200 MG/1
200 TABLET ORAL DAILY
COMMUNITY

## 2022-08-17 NOTE — PROGRESS NOTES
Subjective:     Encounter Date:08/17/2022      Patient ID: Ryan SANCHEZ is a 67 y.o. 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    Chief Complaint: CHF follow up   Congestive Heart Failure  Presents for follow-up visit. Associated symptoms include chest pain and unexpected weight change. Pertinent negatives include no chest pressure, edema, fatigue, near-syncope, orthopnea, palpitations, paroxysmal nocturnal dyspnea or shortness of breath. The symptoms have been stable. The pain is mild. The quality of the pain is described as dull. The pain does not radiate. Compliance with total regimen is %. Compliance with diet is %. Compliance with exercise is %. Compliance with medications is %.   Atrial Fibrillation  Presents for follow-up visit. Symptoms include chest pain and dizziness. Symptoms are negative for bradycardia, hemodynamic instability, hypertension, hypotension, palpitations, shortness of breath, syncope, tachycardia and weakness. The symptoms have been stable. Past medical history includes atrial fibrillation. There are no medication compliance problems.     Mr. Sanchez was first seen by our service in consultation in December 2020 for new findings of atrial fibrillation with RVR.  He had presented to the hospital with complaints of shortness of breath and found to be in atrial fibrillation, chronicity unknown at that time.  He had reported at least a month of symptoms including shortness of breath, sharp left-sided chest pain.  During that hospital evaluation the patient had echocardiogram and was found to have a reduced left ventricular systolic function.  His EF was estimated to be 40 to 45% at that time.  Mild mitral regurgitation was noted.  During that  "hospitalization the patient was started on oral anticoagulation, ACE inhibitor, loop diuretic, beta-blocker for management of his systolic heart failure, and medication adjustments for improvement of his heart rates.  During his hospitalization a CT angiogram was ordered of his chest for evaluation of his complaints of shortness of breath.  This revealed a left atrial appendage thrombus.  It was recommended that the patient follow-up after discharge with cardiology, however it does not appear that an appointment was made when he was discharged from the hospital.      Subsequently, the patient presented back to the hospital in March with complaints of shortness of breath and palpitations.  He was evaluated in the emergency department and admitted for further evaluation and care.  The ER note mentions that the patient had been \"converted\" however it appears he was discharged in December in atrial fibrillation without follow-up in between.  It is likely the patient has been in persistent A. fib since that time.  During the hospitalization in March cardiology was consulted due to atrial fibrillation.  The patient had reported his heart pounding and heart palpitations.  He reported easy fatigability and shortness of breath.  He denied chest pain at that time as well.  He had previously been a heavy alcohol drinker and reported cessation of alcohol use but endorsed drinking a significant amount of coffee each day.  When he presented he was started on IV amiodarone due to rapid ventricular response on presentation.  He was converted to oral amiodarone at discharge.  His diltiazem was discontinued.  His Xarelto was continued for anticoagulation use and he was continued on his lisinopril and Toprol-XL at discharge.  He was using furosemide as needed.    He presented to the office for follow-up 4/16/21.  He was in atrial fibrillation on EKG his heart rate was 99.  He reported fatigue.  His shortness of breath was improved.  " He does have episodes of shortness of breath generally related to elevated heart rates.  He reported improvement of his heart rates and shortness of breath after taking an extra dose of his metoprolol at home on a couple occasions since he has been discharged.  He was on anticoagulation with Xarelto, but had not been taking this with his largest meal.  A repeat echocardiogram was obtained during his hospitalization in March.  His left ventricular systolic function has reduced since December (40-45% down to 30-35%).  He was mildly volume overloaded during his hospitalization in March 2021 and received some IV diuretics during that time as well.    He was transitioned from lisinopril to Entresto. His beta blocker dose was increased and he was instructed to monitor weights, and symptoms, and use lasix as needed. He presented on 4/29/21 after initiating Entresto and was doing well from a CHF standpoint. He was still having some fatigue at that time. He was scheduled for outpatient cardioversion.     He was cardioverted successfully with CHANTEL/Cardioversion on 5/5/21.  He felt improved after discharge. He was doing well and noticed and increase in heart rate with slow weight gain within one month afterwards.  He was taking his Entresto and Toprol as prescribed.  He was found to be back in AF on EKG at follow up. He was referred to EP.  He was seen by Dr. Monteiro - [facility] and ablation was recommended. This was scheduled and completed in December 2021.  His CHANTEL noted improvement of his EF from 31-35% to 46-50%.     He is done well from a rhythm standpoint since his ablation.  A repeat echocardiogram noted normalization of his left ventricular ejection fraction.  The patient has done well on his heart failure medications.  At the time of his acute illness in December 2020, the patient stopped drinking alcohol.  He has since resumed estimating he drinks approximately 3 beers daily.  His previous habit he estimates he was drinking  5-6 beers a day at that time.  He denies any symptoms of congestive heart failure although his weights are slightly above what they usually are.  He reports daily weights at home and averages a dry weight around 170 pounds.  He has not used his Lasix prescription since just after he was seen by me in May.  He remains anticoagulated with Xarelto denies any bleeding issues.  He reports to be taking his Entresto and Toprol but tells me he only takes Entresto once daily (which was an oversight regarding the instructions by the patient).    New complaints include chest pain.  The patient was recently seen by gastroenterology for routine testing.  He had endoscopy and colonoscopy.  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  He also needed esophageal stretching and dilation at that time however this was not done given the findings of Huggins's.  He is supposed to have esophageal dilation later this month.    He is uncertain if his chest pain is related to his esophagus issues, presence of hiatal hernia, possible intermittent periods of volume overload, or something else.  He is attributed to indigestion.  He reports chest pain intermittently at random times and no relation to exertion or activities that comes and goes on its own.  Episodes last less than 5 minutes.  He describes it as a dull pain in the left side of his chest radiating from the upper chest down to the sternum area.  He denies any activities that make this worse or better.  He denies any reproducible pain with range of motion in his arms or palpation to his chest.  He has no known coronary artery disease.  He has not had a previous ischemic evaluation due to the improvement of his EF after improvement of his tachyarrhythmia.  He reports intermittent dizziness however this occurs at random times has not been associated with low blood pressure and resolves on its own.  He says at some point he will  sit down and rest if he feels dizzy and feels better within minutes.    The following portions of the patient's history were reviewed and updated as appropriate: allergies, current medications, past family history, past medical history, past social history and past surgical history.     No Known Allergies      Current Outpatient Medications:   •  acetaminophen (TYLENOL) 325 MG tablet, Take 2 tablets by mouth Every 4 (Four) Hours As Needed for Mild Pain ., Disp: 30 tablet, Rfl: 0  •  amiodarone (PACERONE) 200 MG tablet, Take 200 mg by mouth Daily., Disp: , Rfl:   •  celecoxib (CeleBREX) 200 MG capsule, Take 200 mg by mouth As Needed., Disp: , Rfl:   •  furosemide (Lasix) 40 MG tablet, Take 1 tablet by mouth Daily As Needed (Weight gain, shortness of breath, swelling)., Disp: 30 tablet, Rfl: 3  •  loratadine (CLARITIN) 10 MG tablet, Take 10 mg by mouth As Needed., Disp: , Rfl:   •  metoprolol succinate XL (TOPROL-XL) 50 MG 24 hr tablet, Take 100 mg by mouth Every Night., Disp: , Rfl:   •  omeprazole (priLOSEC) 20 MG capsule, Take 1 capsule by mouth 2 (Two) Times a Day Before Meals for 90 days., Disp: 180 capsule, Rfl: 3  •  rivaroxaban (XARELTO) 20 MG tablet, Take 20 mg by mouth Daily., Disp: , Rfl:   •  sacubitril-valsartan (ENTRESTO) 24-26 MG tablet, Take 1 tablet by mouth 2 (Two) Times a Day., Disp: 60 tablet, Rfl: 11      Review of Systems   Constitutional: Positive for unexpected weight change. Negative for chills, fatigue, malaise/fatigue and weight gain.   Cardiovascular: Positive for chest pain. Negative for dyspnea on exertion, irregular heartbeat, leg swelling, near-syncope, orthopnea, palpitations, paroxysmal nocturnal dyspnea and syncope.   Respiratory: Negative for cough, shortness of breath and wheezing.    Hematologic/Lymphatic: Negative for bleeding problem. Bruises/bleeds easily.   Musculoskeletal: Positive for joint pain.   Gastrointestinal: Negative for bloating, melena, nausea and vomiting.  "  Genitourinary: Negative for hematuria.   Neurological: Positive for dizziness. Negative for focal weakness, headaches, light-headedness and weakness.   Psychiatric/Behavioral: Negative for altered mental status.         ECG 12 Lead    Date/Time: 8/17/2022 9:43 AM  Performed by: Kortney Valentino APRN  Authorized by: Kortney Valentino APRN   Comparison: compared with previous ECG from 2/10/2022  Similar to previous ECG  Rhythm: sinus rhythm  Rate: normal  BPM: 71  Conduction: conduction normal  ST Segments: ST segments normal  QRS axis: left  Other findings: T wave abnormality    Clinical impression: abnormal EKG             /75 (BP Location: Right arm, Patient Position: Sitting, Cuff Size: Adult)   Pulse 71   Resp 18   Ht 170.2 cm (67\")   Wt 78 kg (172 lb)   SpO2 98%   BMI 26.94 kg/m²        Objective:     Vitals reviewed.   Constitutional:       General: Awake. Not in acute distress.     Appearance: Normal appearance. Well-developed, well-groomed, overweight and not in distress. Chronically ill-appearing. Not ill-appearing.   HENT:      Head: Normocephalic and atraumatic.   Pulmonary:      Effort: Pulmonary effort is normal.      Breath sounds: Normal breath sounds. No wheezing. No rhonchi. No rales.   Cardiovascular:      Normal rate. Regular rhythm.      Murmurs: There is no murmur.      No gallop. No rub.   Edema:     Peripheral edema absent.   Abdominal:      General: Bowel sounds are normal.   Musculoskeletal:      Cervical back: Normal range of motion and neck supple. Skin:     General: Skin is warm and dry.   Neurological:      Mental Status: Alert, oriented to person, place, and time and oriented to person, place and time.   Psychiatric:         Attention and Perception: Attention normal.         Mood and Affect: Mood and affect normal.         Speech: Speech normal.         Behavior: Behavior normal. Behavior is cooperative.         Thought Content: Thought content normal.         Cognition and " Memory: Cognition and memory normal.       Lab Review:    Adult Transthoracic Echo Complete W/ Cont if Necessary Per Protocol  04/01/2022  Interpretation Summary  · Left ventricular systolic function is normal. Left ventricular ejection fraction appears to be 56 - 60%.  · Left ventricular wall thickness is consistent with mild septal asymmetric hypertrophy.  · Normal right ventricular cavity size and systolic function noted.  · No significant valvular abnormalities identified on this study.    12/02/2021 EP Study/Ablation  Notes:  Pulmonary vein isolation successfully achieved by cryo- balloon ablation, with inclusion of the posterior wall, CTI line placement, and spontaneous termination of AF. Dr. Derrick Monteiro     Interpretation Summary  TTE 5/5/2021  · Left ventricular systolic function is mildly decreased. Left ventricular ejection fraction appears to be 46 - 50%.  · No evidence of a left atrial appendage thrombus.  · Trace to mild mitral valve regurgitation is present.     Interpretation Summary  TTE 3/26/21  · Left ventricular ejection fraction appears to be 31 - 35%. Left ventricular systolic function is moderately decreased.  · Left ventricular diastolic function is consistent with (grade II w/high LAP) pseudonormalization.  · The left atrial cavity is mildly dilated.  · Mitral annular calcification is present. Moderate mitral valve regurgitation is present. No significant mitral valve stenosis is present.  · No evidence of pulmonary hypertension is present.    CT angiogram chest: 12/26/2020-impression relates left atrial appendage thrombus.          Assessment:          Diagnosis Plan   1. Chronic combined systolic and diastolic congestive heart failure (HCC)     2. Dilated cardiomyopathy (HCC)     3. Persistent atrial fibrillation (HCC)     4. Chronic anticoagulation     5. Chest pain, atypical            Plan:       -Chronic combined systolic and diastolic congestive heart failure: Stable.  NYHA class  II.  Initially when he was hospitalized, his EF was 40 to 45% (December 2020) and then dropped to 30 to 35% (March 2021).  He was transitioned from Lisinopril to Entresto.  He is on Toprol-XL.  He has lasix at home for PRN.  His most recent echocardiogram reveals normalization of his left ventricular ejection fraction of 56 to 60%.  Continue current GDMT for management of CHF.   Monitor weights at home.  Continue as needed Lasix at home.  He has been stable with using diuretic therapy on an as needed basis.  Home medications include Entresto, Toprol-XL, as needed Lasix.    -Dilated cardiomyopathy: He had a mildly dilated left ventricle noted on echocardiogram with reduced LVEF of 30-35% which was performed in March 2021.  He required CHANTEL prior to his cardioversion, Left ventricular ejection fraction at that time estimated to be 45 to 50%.  At the time of diagnosis it was felt that his dilated cardiomyopathy may be due to to alcohol abuse or tachycardia mediated.  He has not had an ischemic work-up recently.  He did have a dobutamine stress echo in April 2019 ordered by his primary care office for complaints of shortness of breath.  He is stable now without any complaints and has had normalization of his LVEF.  Given that improvement in the patient being asymptomatic no further ischemic work-up is planned at this time.  Of note, the patient had quit drinking after his previous hospitalization last year but does admit to drinking alcohol once again.  He reports drinking 3 beers per day.    -Atrial fibrillation: Previous persistent atrial fibrillation now status post ablation per Dr. Monteiro in December 2021.  ECG today demonstrates sinus rhythm.  The patient denies any palpitations.  He had improvement of fatigue, shortness of breath, palpitations with ablation.  He remains anticoagulated and is on amiodarone daily.  He likely will have adjustments made to his amiodarone by electrophysiology as there was reference to  this in the last office visit if his LVEF had improved.  Continue current medications including anticoagulation, antiarrhythmic, beta-blocker therapies.  Follow-up with Dr. Monteiro as scheduled this month.    -Chronic anticoagulation: Initiated in December 2020.  He reports compliance with Xarelto daily for stroke risk reduction.    -Atypical chest pain: Intermittent left-sided chest pain that has been ongoing for months not worsening in frequency or intensity.  Episodes last less than 5 minutes described as dull.  No worsening of discomfort with position changes or palpation.  The patient denies any associated symptoms.  His pain does not radiate.  The pain is not associated with any exertional activities.  He was not previously on PPI therapy which was recommended by myself after he told me he was only taking his anticoagulation intermittently due to high doses of nonsteroidal anti-inflammatories to for treatment of sciatic pain.  Recent endoscopy revealed presence of Huggins's esophagus.  It was recommended to increase PPI therapy to p.o. twice daily.  He has done this as recommended and has plans for esophageal dilation later this month.  We will follow-up on his complaints of chest pain after he has planned esophageal dilation.    -EtOH abuse: The patient previously had stopped drinking alcohol when he was found to be acutely ill in December 2020.  In the last 6 months he has resumed drinking.  Previously reported drinking only on the weekends now he reports drinking 3 beers daily.  Caution patient given ongoing use of long-term anticoagulation.        Follow-up with gastroenterology as planned.  Continue twice daily Prilosec as recommended by GI.  Continue heart failure medications including Entresto, Toprol-XL.  Take Lasix daily for 3 days given slight increase in weight.   Follow-up in 3 months.  We will reevaluate complaints of chest pain in 3 months.  This may be related to the need for esophageal  dilation or the presence of his hiatal hernia.  It does not sound like ischemic chest pain.        I attest that all portions of this note have been reviewed and updated to reflect the patient's current status.

## 2022-08-29 ENCOUNTER — LAB (OUTPATIENT)
Dept: LAB | Facility: HOSPITAL | Age: 67
End: 2022-08-29

## 2022-08-29 LAB — SARS-COV-2 ORF1AB RESP QL NAA+PROBE: NOT DETECTED

## 2022-08-29 PROCEDURE — U0004 COV-19 TEST NON-CDC HGH THRU: HCPCS | Performed by: CLINICAL NURSE SPECIALIST

## 2022-08-29 PROCEDURE — C9803 HOPD COVID-19 SPEC COLLECT: HCPCS

## 2022-08-31 ENCOUNTER — HOSPITAL ENCOUNTER (OUTPATIENT)
Facility: HOSPITAL | Age: 67
Setting detail: HOSPITAL OUTPATIENT SURGERY
Discharge: HOME | End: 2022-08-31
Attending: INTERNAL MEDICINE | Admitting: INTERNAL MEDICINE

## 2022-08-31 VITALS
OXYGEN SATURATION: 98 % | HEART RATE: 74 BPM | TEMPERATURE: 97.4 F | DIASTOLIC BLOOD PRESSURE: 98 MMHG | SYSTOLIC BLOOD PRESSURE: 150 MMHG | BODY MASS INDEX: 27.31 KG/M2 | WEIGHT: 174 LBS | RESPIRATION RATE: 20 BRPM | HEIGHT: 67 IN

## 2022-08-31 DIAGNOSIS — R13.19 ESOPHAGEAL DYSPHAGIA: ICD-10-CM

## 2022-08-31 PROCEDURE — G0463 HOSPITAL OUTPT CLINIC VISIT: HCPCS | Performed by: INTERNAL MEDICINE

## 2022-08-31 RX ORDER — SODIUM CHLORIDE 9 MG/ML
500 INJECTION, SOLUTION INTRAVENOUS CONTINUOUS PRN
Status: DISCONTINUED | OUTPATIENT
Start: 2022-08-31 | End: 2022-09-14 | Stop reason: HOSPADM

## 2022-08-31 RX ORDER — LIDOCAINE HYDROCHLORIDE 10 MG/ML
0.5 INJECTION, SOLUTION EPIDURAL; INFILTRATION; INTRACAUDAL; PERINEURAL ONCE AS NEEDED
Status: CANCELLED | OUTPATIENT
Start: 2022-08-31

## 2022-08-31 RX ORDER — SODIUM CHLORIDE 0.9 % (FLUSH) 0.9 %
10 SYRINGE (ML) INJECTION AS NEEDED
Status: DISCONTINUED | OUTPATIENT
Start: 2022-08-31 | End: 2022-09-14 | Stop reason: HOSPADM

## 2022-09-06 ENCOUNTER — PREP FOR SURGERY (OUTPATIENT)
Dept: GASTROENTEROLOGY | Facility: CLINIC | Age: 67
End: 2022-09-06

## 2022-09-06 DIAGNOSIS — R13.19 ESOPHAGEAL DYSPHAGIA: Primary | ICD-10-CM

## 2022-09-14 ENCOUNTER — ANESTHESIA (OUTPATIENT)
Dept: GASTROENTEROLOGY | Facility: HOSPITAL | Age: 67
End: 2022-09-14

## 2022-09-14 ENCOUNTER — HOSPITAL ENCOUNTER (OUTPATIENT)
Facility: HOSPITAL | Age: 67
Setting detail: HOSPITAL OUTPATIENT SURGERY
Discharge: HOME | End: 2022-09-14
Attending: INTERNAL MEDICINE | Admitting: INTERNAL MEDICINE

## 2022-09-14 ENCOUNTER — ANESTHESIA EVENT (OUTPATIENT)
Dept: GASTROENTEROLOGY | Facility: HOSPITAL | Age: 67
End: 2022-09-14

## 2022-09-14 VITALS
HEART RATE: 100 BPM | DIASTOLIC BLOOD PRESSURE: 99 MMHG | RESPIRATION RATE: 15 BRPM | HEIGHT: 67 IN | TEMPERATURE: 97.4 F | SYSTOLIC BLOOD PRESSURE: 140 MMHG | WEIGHT: 173 LBS | BODY MASS INDEX: 27.15 KG/M2 | OXYGEN SATURATION: 93 %

## 2022-09-14 DIAGNOSIS — R13.19 ESOPHAGEAL DYSPHAGIA: ICD-10-CM

## 2022-09-14 PROCEDURE — 43450 DILATE ESOPHAGUS 1/MULT PASS: CPT | Performed by: INTERNAL MEDICINE

## 2022-09-14 PROCEDURE — 43239 EGD BIOPSY SINGLE/MULTIPLE: CPT | Performed by: INTERNAL MEDICINE

## 2022-09-14 PROCEDURE — 25010000002 PROPOFOL 10 MG/ML EMULSION: Performed by: NURSE ANESTHETIST, CERTIFIED REGISTERED

## 2022-09-14 PROCEDURE — 88305 TISSUE EXAM BY PATHOLOGIST: CPT | Performed by: INTERNAL MEDICINE

## 2022-09-14 RX ORDER — SODIUM CHLORIDE 0.9 % (FLUSH) 0.9 %
10 SYRINGE (ML) INJECTION EVERY 12 HOURS SCHEDULED
Status: DISCONTINUED | OUTPATIENT
Start: 2022-09-14 | End: 2022-09-14 | Stop reason: HOSPADM

## 2022-09-14 RX ORDER — PROPOFOL 10 MG/ML
VIAL (ML) INTRAVENOUS AS NEEDED
Status: DISCONTINUED | OUTPATIENT
Start: 2022-09-14 | End: 2022-09-14 | Stop reason: SURG

## 2022-09-14 RX ORDER — SODIUM CHLORIDE 9 MG/ML
100 INJECTION, SOLUTION INTRAVENOUS CONTINUOUS
Status: DISCONTINUED | OUTPATIENT
Start: 2022-09-14 | End: 2022-09-14 | Stop reason: HOSPADM

## 2022-09-14 RX ORDER — LIDOCAINE HYDROCHLORIDE 20 MG/ML
INJECTION, SOLUTION EPIDURAL; INFILTRATION; INTRACAUDAL; PERINEURAL AS NEEDED
Status: DISCONTINUED | OUTPATIENT
Start: 2022-09-14 | End: 2022-09-14 | Stop reason: SURG

## 2022-09-14 RX ORDER — SODIUM CHLORIDE 0.9 % (FLUSH) 0.9 %
10 SYRINGE (ML) INJECTION AS NEEDED
Status: DISCONTINUED | OUTPATIENT
Start: 2022-09-14 | End: 2022-09-14 | Stop reason: HOSPADM

## 2022-09-14 RX ADMIN — GLYCOPYRROLATE 0.1 MG: 0.2 INJECTION INTRAMUSCULAR; INTRAVENOUS at 10:53

## 2022-09-14 RX ADMIN — SODIUM CHLORIDE 100 ML/HR: 9 INJECTION, SOLUTION INTRAVENOUS at 09:54

## 2022-09-14 RX ADMIN — PROPOFOL 300 MG: 10 INJECTION, EMULSION INTRAVENOUS at 10:53

## 2022-09-14 RX ADMIN — LIDOCAINE HYDROCHLORIDE 100 MG: 20 INJECTION, SOLUTION EPIDURAL; INFILTRATION; INTRACAUDAL; PERINEURAL at 10:53

## 2022-09-14 NOTE — H&P
[facility] Gastroenterology  Pre Procedure History & Physical    Chief Complaint:   Dysphagia    Subjective     HPI:   Here for endoscopy.  Dysphagia.  Known history of acid reflux    Past Medical History:   Past Medical History:   Diagnosis Date   • Arthritis    • Atrial fibrillation (HCC)    • Huggins esophagus    • Bladder stone    • Chronic anticoagulation    • Chronic combined systolic (congestive) and diastolic (congestive) heart failure (HCC)    • Depression    • Diabetes mellitus (HCC)    • History of transfusion    • Hx of colonic polyps    • Hypertension    • Kidney stones    • Left atrial thrombus 02/26/2020   • Melanoma (HCC)    • Mitral valve prolapse    • PONV (postoperative nausea and vomiting)        Past Surgical History:  Past Surgical History:   Procedure Laterality Date   • ABLATION OF DYSRHYTHMIC FOCUS      12/02/2021EP Study/AF Ablation   • BLADDER NECK CONTRACTURE REPAIR N/A 12/02/2016    Procedure: BLADDER NECK CONTRACTURE REPAIR;  Surgeon: Ryan Lewis MD;  Location: [facility] OR;  Service:    • CARDIOVERSION     • COLONOSCOPY N/A 7/20/2022    Procedure: COLONOSCOPY WITH ANESTHESIA;  Surgeon: Joshua Zuleta MD;  Location: [facility] ENDOSCOPY;  Service: Gastroenterology;  Laterality: N/A;  pre  post  DrAvery    • COLONOSCOPY W/ POLYPECTOMY  09/10/2015    Tubular adenoma at 30 cm repeat exam in 5 years   • CYSTOSCOPY BLADDER STONE LITHOTRIPSY  2011   • CYSTOSCOPY W/ URETERAL STENT REMOVAL N/A 12/02/2016    Procedure: CYSTOLITHOLAPAXY WITH LASER and TRANURETHRAL INCISION OF BLADDER NECK CONTRACTURE;  Surgeon: Ryan Lewis MD;  Location: [facility] OR;  Service:    • ENDOSCOPY  07/14/2014    HH, Reflux esophagitis   • ENDOSCOPY N/A 10/05/2017    LA Grade C reflux esophagitis repeat exam in 3 months   • ENDOSCOPY N/A 01/02/2018    LA Grade B reflux esophagitis negative for Intestinal Metaplasia   • ENDOSCOPY N/A 7/20/2022    Procedure: ESOPHAGOGASTRODUODENOSCOPY WITH ANESTHESIA;  Surgeon: Merlyn  Joshua VANEGAS MD;  Location: [facility] ENDOSCOPY;  Service: Gastroenterology;  Laterality: N/A;  pre screen  post hiatal hernia;esophagitis  Tem[place] CHARLES   • ROOT CANAL Left    • TONSILLECTOMY     • VASECTOMY         Family History:  Family History   Problem Relation Age of Onset   • No Known Problems Mother    • No Known Problems Father    • Colon cancer Maternal Grandfather    • Colon polyps Neg Hx        Social History:   reports that he has never smoked. He has never used smokeless tobacco. He reports current alcohol use of about 14.0 standard drinks of alcohol per week. He reports that he does not use drugs.    Medications:   Prior to Admission medications    Medication Sig Start Date End Date Taking? Authorizing Provider   acetaminophen (TYLENOL) 325 MG tablet Take 2 tablets by mouth Every 4 (Four) Hours As Needed for Mild Pain . 3/27/21   Mustapha Persaud MD   amiodarone (PACERONE) 200 MG tablet Take 200 mg by mouth Daily.    ProviderOrlando MD   celecoxib (CeleBREX) 200 MG capsule Take 200 mg by mouth As Needed.    ProviderOrlando MD   furosemide (Lasix) 40 MG tablet Take 1 tablet by mouth Daily As Needed (Weight gain, shortness of breath, swelling). 12/29/20   Lianna Lewis APRN   loratadine (CLARITIN) 10 MG tablet Take 10 mg by mouth As Needed.    Orlando Mei MD   metoprolol succinate XL (TOPROL-XL) 50 MG 24 hr tablet Take 100 mg by mouth Every Night.    Orlando Mei MD   omeprazole (priLOSEC) 20 MG capsule Take 1 capsule by mouth 2 (Two) Times a Day Before Meals for 90 days. 7/20/22 10/18/22  Joshua Zuleta MD   rivaroxaban (XARELTO) 20 MG tablet Take 20 mg by mouth Daily.    Orlando Mei MD   sacubitril-valsartan (ENTRESTO) 24-26 MG tablet Take 1 tablet by mouth 2 (Two) Times a Day. 4/16/21   Kortney Valentino APRN       Allergies:  Patient has no known allergies.    Objective     Blood pressure 158/99, pulse 79, temperature 97.4 °F (36.3 °C), temperature  "source Temporal, resp. rate 18, height 170.2 cm (67\"), weight 78.5 kg (173 lb), SpO2 98 %.    Physical Exam   Constitutional: Pt is oriented to person, place, and in no distress.   HENT: Mouth/Throat: Oropharynx is clear.   Cardiovascular: Normal rate, regular rhythm.    Pulmonary/Chest: Effort normal. No respiratory distress. No  wheezes.   Abdominal: Soft. Non-distended.  Skin: Skin is warm and dry.   Psychiatric: Mood, memory, affect and judgment appear normal.     Assessment & Plan     Diagnosis:  Dysphagia    Anticipated Surgical Procedure:    Proceed with endoscopy as scheduled    The following major R/B/A were discussed with the patient, however the list is not all inclusive . Risk:  Bleeding (immediate and delayed), perforation (rupture or tear), reaction to medication, missed lesion/cancer, pain during the procedure, infection, need for surgery, need for ostomy, need for mechanical ventilation (breathing machine), death.  Benefits: removal of polyp/tissue, burn/clip/or inject to stop bleeding, removal of foreign body, dilate any stricture.  Alternatives: Xray or CT, surgery, do nothing with associated risk   The patient was given time to ask question and received explanation, and agrees to proceed as per History and Physical.   No guarantee given or expressed.    EMR Dragon/transcription disclaimer: Much of this encounter note is an electronic transcription/translation of spoken language to printed text.  The electronic translation of spoken language may permit erroneous, or at times, nonsensical words or phrases to be inadvertently transcribed.  Although I have reviewed the note for such errors, some may still exist.    Joshua Zuleta MD  10:52 CDT  9/14/2022    "

## 2022-09-14 NOTE — ANESTHESIA POSTPROCEDURE EVALUATION
Patient: Ryan DUARTE    Procedure Summary     Date: 09/14/22 Room / Location: [facility] ENDOSCOPY 2 / [facility] ENDOSCOPY    Anesthesia Start: 1051 Anesthesia Stop: 1102    Procedure: ESOPHAGOGASTRODUODENOSCOPY WITH ANESTHESIA (N/A ) Diagnosis:       Esophageal dysphagia      (Esophageal dysphagia [R13.19])    Surgeons: Joshua Zuleta MD Provider: Minerva Gerard CRNA    Anesthesia Type: MAC ASA Status: 3          Anesthesia Type: MAC    Vitals  Vitals Value Taken Time   /98 09/14/22 1121   Temp     Pulse 91 09/14/22 1126   Resp 15 09/14/22 1120   SpO2 95 % 09/14/22 1126   Vitals shown include unvalidated device data.        Post Anesthesia Care and Evaluation    Patient location during evaluation: PHASE II  Patient participation: complete - patient participated  Level of consciousness: awake and alert  Pain management: adequate    Airway patency: patent  Anesthetic complications: No anesthetic complications  PONV Status: none  Cardiovascular status: acceptable  Respiratory status: acceptable  Hydration status: acceptable  No anesthesia care post op

## 2022-09-14 NOTE — ANESTHESIA PREPROCEDURE EVALUATION
Anesthesia Evaluation     Patient summary reviewed   history of anesthetic complications: PONV  NPO Solid Status: > 6 hours             Airway   Mallampati: II  Dental      Pulmonary    Cardiovascular     PT is on anticoagulation therapy  Patient on routine beta blocker    (+) hypertension, valvular problems/murmurs MVP, dysrhythmias (s/p ablation; ) Atrial Fib, CHF ,   (-) pacemaker, past MI, cardiac stents, CABG    ROS comment: · Left ventricular systolic function is normal. Left ventricular ejection fraction appears to be 56 - 60%.  · Left ventricular wall thickness is consistent with mild septal asymmetric hypertrophy.  · Normal right ventricular cavity size and systolic function noted.  · No significant valvular abnormalities identified on this study.        Neuro/Psych  GI/Hepatic/Renal/Endo    (+)   renal disease, diabetes mellitus type 2,     Musculoskeletal     Abdominal    Substance History      OB/GYN          Other   arthritis,              Anesthesia Evaluation     history of anesthetic complications: PONV  NPO Solid Status: > 8 hours  NPO Liquid Status: > 8 hours           Airway   Mallampati: II  No difficulty expected  Dental      Pulmonary    (-) asthma, recent URI, sleep apnea, not a smoker  Cardiovascular   Exercise tolerance: excellent (>7 METS)    ECG reviewed  PT is on anticoagulation therapy    (+) hypertension well controlled, valvular problems/murmurs (diagnosed 20 years ago, no issues) MVP, dysrhythmias (s/p ablation) Atrial Fib, CHF Diastolic >=55% and Systolic <55%,   (-) pacemaker, past MI, angina, cardiac stents    ROS comment: 4/2022  · Left ventricular systolic function is normal. Left ventricular ejection fraction appears to be 56 - 60%.  · Left ventricular wall thickness is consistent with mild septal asymmetric hypertrophy.  · Normal right ventricular cavity size and systolic function noted.  · No significant valvular abnormalities identified on this study.        Neuro/Psych  (-)  seizures, TIA, CVA  GI/Hepatic/Renal/Endo    (+)  GERD well controlled,  diabetes mellitus,   (-) liver disease, no renal disease    Musculoskeletal     Abdominal    Substance History      OB/GYN          Other   arthritis,                        Anesthesia Plan    ASA 3     MAC     intravenous induction     Anesthetic plan, risks, benefits, and alternatives have been provided, discussed and informed consent has been obtained with: patient.     Anesthesia Evaluation     history of anesthetic complications: PONV  NPO Solid Status: > 8 hours  NPO Liquid Status: > 8 hours           Airway   Mallampati: II  No difficulty expected  Dental - normal exam     Pulmonary - normal exam   (-) asthma, recent URI, sleep apnea, not a smoker  Cardiovascular   Exercise tolerance: excellent (>7 METS)    ECG reviewed  PT is on anticoagulation therapy  Rhythm: irregular    (+) hypertension well controlled, valvular problems/murmurs (diagnosed 20 years ago, no issues) MVP, dysrhythmias Atrial Fib, CHF Diastolic >=55% and Systolic <55%,   (-) pacemaker, past MI, angina, cardiac stents    ROS comment: Prior history of left atrial thrombus    Echo:  · Left ventricular ejection fraction appears to be 31 - 35%. Left ventricular systolic function is moderately decreased.  · Left ventricular diastolic function is consistent with (grade II w/high LAP) pseudonormalization.  · The left atrial cavity is mildly dilated.  · Mitral annular calcification is present. Moderate mitral valve regurgitation is present. No significant mitral valve stenosis is present.  · No evidence of pulmonary hypertension is present.    Neuro/Psych  (-) seizures, TIA, CVA  GI/Hepatic/Renal/Endo    (+)  GERD well controlled,  diabetes mellitus,   (-) liver disease, no renal disease    Musculoskeletal     Abdominal  - normal exam   Substance History      OB/GYN          Other                          Anesthesia Plan    ASA 3     MAC   (My preoperative assessment completed  via chart review. CRNA to complete airway assessment and review medical history)  intravenous induction     Anesthetic plan, all risks, benefits, and alternatives have been provided, discussed and informed consent has been obtained with: patient.             Anesthesia Plan    ASA 3     MAC     intravenous induction     Anesthetic plan, risks, benefits, and alternatives have been provided, discussed and informed consent has been obtained with: patient.        CODE STATUS:

## 2022-09-26 RX ORDER — SACUBITRIL AND VALSARTAN 24; 26 MG/1; MG/1
TABLET, FILM COATED ORAL
Qty: 60 TABLET | Refills: 11 | Status: SHIPPED | OUTPATIENT
Start: 2022-09-26

## 2022-11-14 RX ORDER — FUROSEMIDE 40 MG/1
TABLET ORAL
Qty: 30 TABLET | Refills: 1 | OUTPATIENT
Start: 2022-11-14

## 2022-11-17 ENCOUNTER — OFFICE VISIT (OUTPATIENT)
Dept: CARDIOLOGY | Facility: CLINIC | Age: 67
End: 2022-11-17

## 2022-11-17 VITALS
SYSTOLIC BLOOD PRESSURE: 128 MMHG | BODY MASS INDEX: 27 KG/M2 | OXYGEN SATURATION: 98 % | WEIGHT: 172 LBS | HEART RATE: 68 BPM | HEIGHT: 67 IN | DIASTOLIC BLOOD PRESSURE: 88 MMHG

## 2022-11-17 DIAGNOSIS — Z79.01 CHRONIC ANTICOAGULATION: Chronic | ICD-10-CM

## 2022-11-17 DIAGNOSIS — R07.89 CHEST PAIN, ATYPICAL: ICD-10-CM

## 2022-11-17 DIAGNOSIS — I50.42 CHRONIC COMBINED SYSTOLIC AND DIASTOLIC CONGESTIVE HEART FAILURE: Primary | Chronic | ICD-10-CM

## 2022-11-17 DIAGNOSIS — F41.9 ANXIETY: ICD-10-CM

## 2022-11-17 DIAGNOSIS — I48.19 PERSISTENT ATRIAL FIBRILLATION: Chronic | ICD-10-CM

## 2022-11-17 DIAGNOSIS — F10.10 ETOH ABUSE: ICD-10-CM

## 2022-11-17 DIAGNOSIS — I42.0 DILATED CARDIOMYOPATHY: Chronic | ICD-10-CM

## 2022-11-17 PROCEDURE — 99214 OFFICE O/P EST MOD 30 MIN: CPT | Performed by: NURSE PRACTITIONER

## 2022-11-17 RX ORDER — FUROSEMIDE 40 MG/1
40 TABLET ORAL DAILY PRN
Qty: 30 TABLET | Refills: 3 | Status: SHIPPED | OUTPATIENT
Start: 2022-11-17

## 2022-11-17 RX ORDER — SILDENAFIL 50 MG/1
50 TABLET, FILM COATED ORAL DAILY PRN
COMMUNITY
Start: 2022-10-25

## 2022-11-17 NOTE — PROGRESS NOTES
Subjective:     Encounter Date:11/17/2022      Patient ID: Ryan SANCHEZ is a 67 y.o. 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    Chief Complaint: Routine CHF follow up   Congestive Heart Failure  Presents for follow-up visit. Associated symptoms include chest pain and unexpected weight change. Pertinent negatives include no chest pressure, edema, fatigue, near-syncope, orthopnea, palpitations, paroxysmal nocturnal dyspnea or shortness of breath. The symptoms have been stable. The pain is at a severity of 1/10. The pain is mild. The quality of the pain is described as dull. The pain does not radiate. Compliance with total regimen is %. Compliance with diet is %. Compliance with exercise is %. Compliance with medications is %.   Atrial Fibrillation  Presents for follow-up visit. Symptoms include chest pain. Symptoms are negative for bradycardia, hemodynamic instability, hypertension, hypotension, palpitations, shortness of breath, syncope, tachycardia and weakness. The symptoms have been stable. Past medical history includes atrial fibrillation and CHF. There are no medication compliance problems.     Mr. Sanchez was first seen by our service in consultation in December 2020 for new findings of atrial fibrillation with RVR.  He had presented to the hospital with complaints of shortness of breath and found to be in atrial fibrillation, chronicity unknown at that time.  He had reported at least a month of symptoms including shortness of breath, sharp left-sided chest pain.  During that hospital evaluation the patient had echocardiogram and was found to have a reduced left ventricular systolic function.  His EF was estimated to be 40 to 45% at that time.  Mild  "mitral regurgitation was noted.  During that hospitalization the patient was started on oral anticoagulation, ACE inhibitor, loop diuretic, beta-blocker for management of his systolic heart failure, and medication adjustments for improvement of his heart rates.  During his hospitalization a CT angiogram was ordered of his chest for evaluation of his complaints of shortness of breath.  This revealed a left atrial appendage thrombus.  It was recommended that the patient follow-up after discharge with cardiology, however it does not appear that an appointment was made when he was discharged from the hospital.      Subsequently, the patient presented back to the hospital in March 2021 with complaints of shortness of breath and palpitations.  He was evaluated in the emergency department and admitted for further evaluation and care.  The ER note mentions that the patient had been \"converted\" however it appears he was discharged in December in atrial fibrillation without follow-up in between.  It is likely the patient had been in persistent A. fib since that time.  During the hospitalization in March cardiology was consulted due to atrial fibrillation.  The patient had reported his heart pounding and heart palpitations.  He reported easy fatigability and shortness of breath.  He denied chest pain at that time.  He had previously been a heavy alcohol drinker and reported cessation of alcohol use but endorsed drinking a significant amount of coffee each day.  When he presented, he was started on IV amiodarone due to rapid ventricular response on presentation.  He was converted to oral amiodarone at discharge.  His diltiazem was discontinued.  His Xarelto was continued for anticoagulation use and he was continued on his lisinopril and Toprol-XL at discharge.  He was using furosemide as needed.    He presented to the office for follow-up 4/16/21.  He was in atrial fibrillation on EKG his heart rate was 99.  He reported " "fatigue.  His shortness of breath was improved. He was on anticoagulation with Xarelto. A repeat echocardiogram was obtained during his hospitalization in March.  His left ventricular systolic function has reduced since December (40-45% down to 30-35%).  He was mildly volume overloaded during his hospitalization in March 2021 and received some IV diuretics during that time as well.    He was transitioned from lisinopril to Entresto. His beta blocker dose was increased and he was instructed to monitor weights, and symptoms, and use lasix as needed. He presented on 4/29/21 after initiating Entresto and was doing well from a CHF standpoint. He was still having some fatigue at that time. He was scheduled for outpatient cardioversion.     He was cardioverted successfully with CHANTEL/Cardioversion on 5/5/21.  He felt improved after discharge. He was doing well and noticed and increase in heart rate with slow weight gain within one month afterwards.  He was taking his Entresto and Toprol as prescribed.  He was found to be back in AF on EKG at follow up. He was referred to EP.  He was seen by Dr. Monteiro - [facility] and ablation was recommended. This was scheduled and completed in December 2021.  His CHANTEL noted improvement of his EF from 31-35% to 46-50%.     He is done well from a rhythm standpoint since his ablation.  A repeat echocardiogram noted normalization of his left ventricular ejection fraction.  The patient has done well on his heart failure medications.  At the time of his acute illness in December 2020, the patient stopped drinking alcohol.  He has since resumed estimating he drinks approximately a few beers 3 times a week and endorses \"binge\" drinking on the weekends.    He has had a new complaint of chest pain noted when he was seen in August of 2022. He was uncertain if his chest pain is related to his esophagus issues, presence of hiatal hernia, possible intermittent periods of volume overload, or something else.  He " is attributed to indigestion.  He reported chest pain intermittently at random times and no relation to exertion or activities that comes and goes on its own. He describes it as a dull pain in the left side of his chest radiating from the upper chest down to the sternum area.     Today, he continues to note chest pain, mild dull, unchanged from previous per his report. Today, he describes dull left sided chest pain that does not radiate. He reports this is generally constant, never worse, never completely resolved. He feels with with general activity, but also notes no worsening of complaints when he does heavy exertion, such as chopping wood, which he does often.    He denies any issues with volume. He reports no shortness of breath or dyspnea on exertion. He denies any palpitations.  He has been treated recently for an earache. He has had no swelling, weight gain, orthopnea, or PND.  He does admit to added stress the past few month related to his job increasing what he describes as anxiety.     The following portions of the patient's history were reviewed and updated as appropriate: allergies, current medications, past family history, past medical history, past social history and past surgical history.     No Known Allergies      Current Outpatient Medications:   •  acetaminophen (TYLENOL) 325 MG tablet, Take 2 tablets by mouth Every 4 (Four) Hours As Needed for Mild Pain ., Disp: 30 tablet, Rfl: 0  •  amiodarone (PACERONE) 200 MG tablet, Take 200 mg by mouth Daily., Disp: , Rfl:   •  celecoxib (CeleBREX) 200 MG capsule, Take 200 mg by mouth As Needed., Disp: , Rfl:   •  Entresto 24-26 MG tablet, TAKE 1 TABLET BY MOUTH TWICE DAILY., Disp: 60 tablet, Rfl: 11  •  furosemide (Lasix) 40 MG tablet, Take 1 tablet by mouth Daily As Needed (Weight gain, shortness of breath, swelling)., Disp: 30 tablet, Rfl: 3  •  loratadine (CLARITIN) 10 MG tablet, Take 10 mg by mouth As Needed., Disp: , Rfl:   •  metoprolol succinate XL  "(TOPROL-XL) 50 MG 24 hr tablet, Take 100 mg by mouth Every Night., Disp: , Rfl:   •  rivaroxaban (XARELTO) 20 MG tablet, Take 20 mg by mouth Daily., Disp: , Rfl:   •  sildenafil (VIAGRA) 50 MG tablet, Take 50 mg by mouth Daily As Needed., Disp: , Rfl:       Review of Systems   Constitutional: Positive for unexpected weight change. Negative for chills, fatigue, malaise/fatigue and weight gain.   HENT: Positive for ear pain.    Cardiovascular: Positive for chest pain. Negative for dyspnea on exertion, irregular heartbeat, leg swelling, near-syncope, orthopnea, palpitations, paroxysmal nocturnal dyspnea and syncope.   Respiratory: Negative for cough, shortness of breath and wheezing.    Hematologic/Lymphatic: Negative for bleeding problem. Bruises/bleeds easily.   Musculoskeletal: Positive for joint pain.   Gastrointestinal: Negative for bloating, melena, nausea and vomiting.   Genitourinary: Negative for hematuria.   Neurological: Negative for focal weakness, headaches, light-headedness and weakness.   Psychiatric/Behavioral: Negative for altered mental status.       Procedures     /88   Pulse 68   Ht 170.2 cm (67\")   Wt 78 kg (172 lb)   SpO2 98%   BMI 26.94 kg/m²        Objective:     Vitals reviewed.   Constitutional:       General: Awake. Not in acute distress.     Appearance: Normal appearance. Well-developed, well-groomed, overweight and not in distress. Chronically ill-appearing. Not ill-appearing.   HENT:      Head: Normocephalic and atraumatic.   Pulmonary:      Effort: Pulmonary effort is normal.      Breath sounds: Normal breath sounds. No wheezing. No rhonchi. No rales.   Cardiovascular:      Normal rate. Regular rhythm.      Murmurs: There is no murmur.      No gallop. No rub.   Edema:     Peripheral edema absent.   Abdominal:      General: Bowel sounds are normal.   Musculoskeletal:      Cervical back: Normal range of motion and neck supple. Skin:     General: Skin is warm and dry. "   Neurological:      Mental Status: Alert, oriented to person, place, and time and oriented to person, place and time.   Psychiatric:         Attention and Perception: Attention normal.         Mood and Affect: Mood and affect normal.         Speech: Speech normal.         Behavior: Behavior normal. Behavior is cooperative.         Thought Content: Thought content normal.         Cognition and Memory: Cognition and memory normal.         Judgment: Judgment normal.       Lab Review:    Adult Transthoracic Echo Complete W/ Cont if Necessary Per Protocol  04/01/2022  Interpretation Summary  · Left ventricular systolic function is normal. Left ventricular ejection fraction appears to be 56 - 60%.  · Left ventricular wall thickness is consistent with mild septal asymmetric hypertrophy.  · Normal right ventricular cavity size and systolic function noted.  · No significant valvular abnormalities identified on this study.    12/02/2021 EP Study/Ablation  Notes:  Pulmonary vein isolation successfully achieved by cryo- balloon ablation, with inclusion of the posterior wall, CTI line placement, and spontaneous termination of AF. Dr. Derrick Monteiro     Interpretation Summary  TTE 5/5/2021  · Left ventricular systolic function is mildly decreased. Left ventricular ejection fraction appears to be 46 - 50%.  · No evidence of a left atrial appendage thrombus.  · Trace to mild mitral valve regurgitation is present.     Interpretation Summary  TTE 3/26/21  · Left ventricular ejection fraction appears to be 31 - 35%. Left ventricular systolic function is moderately decreased.  · Left ventricular diastolic function is consistent with (grade II w/high LAP) pseudonormalization.  · The left atrial cavity is mildly dilated.  · Mitral annular calcification is present. Moderate mitral valve regurgitation is present. No significant mitral valve stenosis is present.  · No evidence of pulmonary hypertension is present.    CT angiogram chest:  12/26/2020-impression relates left atrial appendage thrombus.        Assessment:          Diagnosis Plan   1. Chronic combined systolic and diastolic congestive heart failure (HCC)        2. Dilated cardiomyopathy (HCC)        3. Persistent atrial fibrillation (HCC)        4. Chronic anticoagulation        5. Chest pain, atypical        6. ETOH abuse        7. Anxiety               Plan:       -Chronic combined systolic and diastolic congestive heart failure: Stable.  NYHA class II.  Initially when he was hospitalized, his EF was 40 to 45% (December 2020) and then dropped to 30 to 35% (March 2021).  He was transitioned from Lisinopril to Entresto.  He is on Toprol-XL.  He has lasix at home for PRN, but he takes this twice weekly routinely.  His most recent echocardiogram reveals normalization of his left ventricular ejection fraction of 56 to 60%.  Continue current GDMT for management of CHF.   Monitor weights at home. Low Sodium diet, daily weights.     -Dilated cardiomyopathy: He had a mildly dilated left ventricle noted on echocardiogram with reduced LVEF of 30-35% which was performed in March 2021.  He required CHANTEL prior to his cardioversion, Left ventricular ejection fraction at that time estimated to be 45 to 50%.  At the time of diagnosis, it was felt that his dilated cardiomyopathy may be due to to alcohol abuse or tachycardia mediated.  He has not had an ischemic work-up recently.  He did have a dobutamine stress echo in April 2019 ordered by his primary care office for complaints of shortness of breath.  Of note, the patient had quit drinking after his previous hospitalization last year but does admit to drinking alcohol once again.    -Atrial fibrillation: Previous persistent atrial fibrillation now status post ablation per Dr. Monteiro in December 2021. The patient denies any palpitations.  He had improvement of fatigue, shortness of breath, palpitations with ablation.  He remains anticoagulated and is  on amiodarone daily.  Continue current medications.    -Chronic anticoagulation: Initiated in December 2020.  He reports compliance with Xarelto with food, but does endorse missing his medications on occasion.,    -Atypical chest pain: Left-sided chest pain that has been ongoing for months not worsening.  See HPI. We will continue to monitor symptoms.  No associated complaints.    -EtOH abuse: The patient previously had stopped drinking alcohol when he was found to be acutely ill in December 2020. Previously reported drinking only on the weekends now he reports drinking 3 days a week and heavy on the weekends. I cautioned him on resuming heavy amounts of alcohol given his use of anticoagulation and his cardiomyopathy.    -Anxiety: The patient endorses new anxiety related to increased stress.  I have asked him to discuss this further with his primary care physician.      Continue current medications.  Low sodium, daily weights.   3 month CHF follow up.    I attest that all portions of this note have been reviewed and updated to reflect the patient's current status.

## 2022-11-29 RX ORDER — METOPROLOL SUCCINATE 50 MG/1
TABLET, EXTENDED RELEASE ORAL
Qty: 60 TABLET | Refills: 11 | Status: SHIPPED | OUTPATIENT
Start: 2022-11-29

## 2023-09-12 RX ORDER — AMIODARONE HYDROCHLORIDE 200 MG/1
TABLET ORAL
Qty: 45 TABLET | Refills: 1 | Status: SHIPPED | OUTPATIENT
Start: 2023-09-12

## 2023-10-05 RX ORDER — SACUBITRIL AND VALSARTAN 24; 26 MG/1; MG/1
TABLET, FILM COATED ORAL
Qty: 60 TABLET | Refills: 2 | OUTPATIENT
Start: 2023-10-05

## 2023-11-29 ENCOUNTER — OFFICE VISIT (OUTPATIENT)
Dept: CARDIOLOGY | Facility: CLINIC | Age: 68
End: 2023-11-29
Payer: MEDICARE

## 2023-11-29 VITALS
RESPIRATION RATE: 18 BRPM | SYSTOLIC BLOOD PRESSURE: 112 MMHG | WEIGHT: 167 LBS | HEIGHT: 67 IN | BODY MASS INDEX: 26.21 KG/M2 | HEART RATE: 66 BPM | DIASTOLIC BLOOD PRESSURE: 74 MMHG | OXYGEN SATURATION: 99 %

## 2023-11-29 DIAGNOSIS — Z79.01 CHRONIC ANTICOAGULATION: Chronic | ICD-10-CM

## 2023-11-29 DIAGNOSIS — I42.0 DILATED CARDIOMYOPATHY: Chronic | ICD-10-CM

## 2023-11-29 DIAGNOSIS — F10.10 ETOH ABUSE: ICD-10-CM

## 2023-11-29 DIAGNOSIS — I48.19 PERSISTENT ATRIAL FIBRILLATION: Chronic | ICD-10-CM

## 2023-11-29 DIAGNOSIS — I50.42 CHRONIC COMBINED SYSTOLIC AND DIASTOLIC CONGESTIVE HEART FAILURE: Primary | Chronic | ICD-10-CM

## 2023-11-29 DIAGNOSIS — Z98.890 HISTORY OF CARDIAC RADIOFREQUENCY ABLATION: Chronic | ICD-10-CM

## 2023-11-29 RX ORDER — AMIODARONE HYDROCHLORIDE 100 MG/1
100 TABLET ORAL DAILY
Qty: 90 TABLET | Refills: 3 | Status: SHIPPED | OUTPATIENT
Start: 2023-11-29

## 2023-11-29 RX ORDER — SACUBITRIL AND VALSARTAN 24; 26 MG/1; MG/1
1 TABLET, FILM COATED ORAL 2 TIMES DAILY
Qty: 60 TABLET | Refills: 11 | Status: SHIPPED | OUTPATIENT
Start: 2023-11-29

## 2023-11-29 RX ORDER — NAPROXEN SODIUM 220 MG
220 TABLET ORAL 2 TIMES DAILY PRN
COMMUNITY

## 2023-11-29 RX ORDER — METOPROLOL SUCCINATE 50 MG/1
50 TABLET, EXTENDED RELEASE ORAL DAILY
Qty: 90 TABLET | Refills: 3 | Status: SHIPPED | OUTPATIENT
Start: 2023-11-29

## 2023-11-29 RX ORDER — ZOLPIDEM TARTRATE 5 MG/1
5 TABLET ORAL NIGHTLY PRN
COMMUNITY

## 2023-11-29 NOTE — PROGRESS NOTES
Subjective:     Encounter Date:11/29/2023      Patient ID: Ryan SANCHEZ is a 68 y.o. 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    Chief Complaint: Routine CHF follow up   Congestive Heart Failure  Presents for follow-up visit. Pertinent negatives include no chest pain, chest pressure, edema, fatigue, near-syncope, orthopnea, palpitations, paroxysmal nocturnal dyspnea, shortness of breath or unexpected weight change. The symptoms have been stable. Compliance with total regimen is %. Compliance with diet is %. Compliance with exercise is %. Compliance with medications is %.   Atrial Fibrillation  Presents for follow-up visit. Symptoms are negative for bradycardia, chest pain, hemodynamic instability, hypertension, hypotension, palpitations, shortness of breath, syncope, tachycardia and weakness. The symptoms have been stable. Past medical history includes CHF. There are no medication compliance problems.     Mr. Sanchez was first seen by our service in consultation in December 2020 for new findings of atrial fibrillation with RVR.  He had presented to the hospital with complaints of shortness of breath and found to be in atrial fibrillation, chronicity unknown at that time.  He had reported at least a month of symptoms including shortness of breath, sharp left-sided chest pain.  During that hospital evaluation the patient had echocardiogram and was found to have a reduced left ventricular systolic function.  His EF was estimated to be 40 to 45% at that time.  Mild mitral regurgitation was noted.  During that hospitalization the patient was started on oral anticoagulation, ACE inhibitor, loop diuretic, beta-blocker for management of his systolic heart failure, and  "medication adjustments for improvement of his heart rates.  During his hospitalization a CT angiogram was ordered of his chest for evaluation of his complaints of shortness of breath.  This revealed a left atrial appendage thrombus.  It was recommended that the patient follow-up after discharge with cardiology, however it does not appear that an appointment was made when he was discharged from the hospital.      Subsequently, the patient presented back to the hospital in March 2021 with complaints of shortness of breath and palpitations.  He was evaluated in the emergency department and admitted for further evaluation and care.  The ER note mentions that the patient had been \"converted\" however it appears he was discharged in December in atrial fibrillation without follow-up in between.  It is likely the patient had been in persistent A. fib since that time.  During the hospitalization in March cardiology was consulted due to atrial fibrillation.  The patient had reported his heart pounding and heart palpitations.  He reported easy fatigability and shortness of breath.  He denied chest pain at that time.  He had previously been a heavy alcohol drinker and reported cessation of alcohol use but endorsed drinking a significant amount of coffee each day.  When he presented, he was started on IV amiodarone due to rapid ventricular response on presentation.  He was converted to oral amiodarone at discharge.  His diltiazem was discontinued.  His Xarelto was continued for anticoagulation use and he was continued on his lisinopril and Toprol-XL at discharge.  He was using furosemide as needed.    He presented to the office for follow-up 4/16/21.  He was in atrial fibrillation on EKG his heart rate was 99.  He reported fatigue.  His shortness of breath was improved. He was on anticoagulation with Xarelto. A repeat echocardiogram was obtained during his hospitalization in March.  His left ventricular systolic function has " reduced since December (40-45% down to 30-35%).  He was mildly volume overloaded during his hospitalization in March 2021 and received some IV diuretics during that time as well.    He was transitioned from lisinopril to Entresto. His beta blocker dose was increased and he was instructed to monitor weights, and symptoms, and use lasix as needed. He presented on 4/29/21 after initiating Entresto and was doing well from a CHF standpoint. He was still having some fatigue at that time. He was scheduled for outpatient cardioversion.     He was cardioverted successfully with CHANTEL/Cardioversion on 5/5/21.  He felt improved after discharge. He was doing well and noticed and increase in heart rate with slow weight gain within one month afterwards.  He was taking his Entresto and Toprol as prescribed.  He was found to be back in AF on EKG at follow up. He was referred to EP.  He was seen by Dr. Monteiro - [facility] and ablation was recommended. This was scheduled and completed in December 2021.  His CHANTEL noted improvement of his EF from 31-35% to 46-50%.     He is done well from a rhythm standpoint since his ablation.  A repeat echocardiogram noted normalization of his left ventricular ejection fraction.  The patient has done well on his heart failure medications.  At the time of his acute illness in December 2020, the patient stopped drinking alcohol.  He has since resumed drinking alcohol.          He was seen last in our office approximately 1 year ago.  He had missed a follow-up appointment prior to that.  He reports that he has been doing well.  He denies any issues with volume including no lower extremity swelling, orthopnea, PND, bloating.  He reports compliance with his cardiac medications.  He has had no symptoms to suggest recurrence of any atrial arrhythmia.  He reports compliance with his medications at home.  He has not required evaluation by his electrophysiologist in [place], [place].  He follows with his primary  care doctor routinely.    The following portions of the patient's history were reviewed and updated as appropriate: allergies, current medications, past family history, past medical history, past social history and past surgical history.     No Known Allergies      Current Outpatient Medications:     amiodarone (PACERONE) 100 MG tablet, Take 1 tablet by mouth Daily., Disp: 90 tablet, Rfl: 3    celecoxib (CeleBREX) 200 MG capsule, Take 1 capsule by mouth As Needed., Disp: , Rfl:     empagliflozin (JARDIANCE) 10 MG tablet tablet, Take  by mouth Daily., Disp: , Rfl:     furosemide (Lasix) 40 MG tablet, Take 1 tablet by mouth Daily As Needed (Weight gain, shortness of breath, swelling)., Disp: 30 tablet, Rfl: 3    loratadine (CLARITIN) 10 MG tablet, Take 1 tablet by mouth As Needed., Disp: , Rfl:     metoprolol succinate XL (TOPROL-XL) 50 MG 24 hr tablet, Take 1 tablet by mouth Daily., Disp: 90 tablet, Rfl: 3    naproxen sodium (ALEVE) 220 MG tablet, Take 1 tablet by mouth 2 (Two) Times a Day As Needed., Disp: , Rfl:     rivaroxaban (XARELTO) 20 MG tablet, Take 1 tablet by mouth Daily., Disp: 90 tablet, Rfl: 3    sacubitril-valsartan (Entresto) 24-26 MG tablet, Take 1 tablet by mouth 2 (Two) Times a Day., Disp: 60 tablet, Rfl: 11    sildenafil (VIAGRA) 50 MG tablet, Take 1 tablet by mouth Daily As Needed., Disp: , Rfl:     TURMERIC PO, Take  by mouth., Disp: , Rfl:     zolpidem (AMBIEN) 5 MG tablet, Take 1 tablet by mouth At Night As Needed for Sleep., Disp: , Rfl:       Review of Systems   Constitutional: Negative for chills, fatigue, malaise/fatigue, unexpected weight change and weight gain.   Cardiovascular:  Negative for chest pain, dyspnea on exertion, irregular heartbeat, leg swelling, near-syncope, orthopnea, palpitations, paroxysmal nocturnal dyspnea and syncope.   Respiratory:  Negative for cough, shortness of breath and wheezing.    Hematologic/Lymphatic: Negative for bleeding problem. Bruises/bleeds easily.  "  Musculoskeletal:  Positive for joint pain.   Gastrointestinal:  Negative for bloating, melena, nausea and vomiting.   Genitourinary:  Negative for hematuria.   Neurological:  Negative for focal weakness, headaches, light-headedness and weakness.   Psychiatric/Behavioral:  Negative for altered mental status.          ECG 12 Lead    Date/Time: 11/29/2023 5:31 PM  Performed by: Kortney Valentino APRN    Authorized by: Kortney Valentino APRN  Comparison: compared with previous ECG from 8/17/2022  Similar to previous ECG  Rhythm: sinus rhythm and sinus arrhythmia  Rate: normal  BPM: 66  Conduction: conduction normal  ST Segments: ST segments normal  T Waves: T waves normal  QRS axis: left    Clinical impression: non-specific ECG           /74 (BP Location: Right arm, Patient Position: Sitting)   Pulse 66   Resp 18   Ht 170.2 cm (67\")   Wt 75.8 kg (167 lb)   SpO2 99%   BMI 26.16 kg/m²        Objective:     Vitals reviewed.   Constitutional:       General: Awake. Not in acute distress.     Appearance: Normal appearance. Well-developed, well-groomed, overweight and not in distress. Chronically ill-appearing. Not ill-appearing.   HENT:      Head: Normocephalic and atraumatic.   Pulmonary:      Effort: Pulmonary effort is normal.      Breath sounds: Normal breath sounds. No wheezing. No rhonchi. No rales.   Cardiovascular:      Normal rate. Regular rhythm.      Murmurs: There is no murmur.      No gallop.  No rub.   Edema:     Peripheral edema absent.   Abdominal:      General: Bowel sounds are normal.   Musculoskeletal:      Cervical back: Normal range of motion and neck supple. Skin:     General: Skin is warm and dry.   Neurological:      Mental Status: Alert, oriented to person, place, and time and oriented to person, place and time.   Psychiatric:         Attention and Perception: Attention normal.         Mood and Affect: Mood and affect normal.         Speech: Speech normal.         Behavior: Behavior " normal. Behavior is cooperative.         Thought Content: Thought content normal.         Cognition and Memory: Cognition and memory normal.         Judgment: Judgment normal.       Lab Review:    Adult Transthoracic Echo Complete W/ Cont if Necessary Per Protocol  04/01/2022  Interpretation Summary  · Left ventricular systolic function is normal. Left ventricular ejection fraction appears to be 56 - 60%.  · Left ventricular wall thickness is consistent with mild septal asymmetric hypertrophy.  · Normal right ventricular cavity size and systolic function noted.  · No significant valvular abnormalities identified on this study.    12/02/2021 EP Study/Ablation  Notes:  Pulmonary vein isolation successfully achieved by cryo- balloon ablation, with inclusion of the posterior wall, CTI line placement, and spontaneous termination of AF. Dr. Derrick Monteiro     Interpretation Summary  TTE 5/5/2021  Left ventricular systolic function is mildly decreased. Left ventricular ejection fraction appears to be 46 - 50%.  No evidence of a left atrial appendage thrombus.  Trace to mild mitral valve regurgitation is present.     Interpretation Summary  TTE 3/26/21  Left ventricular ejection fraction appears to be 31 - 35%. Left ventricular systolic function is moderately decreased.  Left ventricular diastolic function is consistent with (grade II w/high LAP) pseudonormalization.  The left atrial cavity is mildly dilated.  Mitral annular calcification is present. Moderate mitral valve regurgitation is present. No significant mitral valve stenosis is present.  No evidence of pulmonary hypertension is present.    CT angiogram chest: 12/26/2020-impression relates left atrial appendage thrombus.        Assessment:          Diagnosis Plan   1. Chronic combined systolic and diastolic congestive heart failure        2. Dilated cardiomyopathy        3. Persistent atrial fibrillation        4. History of cardiac radiofrequency ablation        5.  Chronic anticoagulation        6. ETOH abuse                 Plan:       -Chronic combined systolic and diastolic congestive heart failure: Stable.  NYHA class II.  Initially when he was hospitalized, his EF was 40 to 45% (December 2020) and then dropped to 30 to 35% (March 2021).  He was transitioned from Lisinopril to Entresto.  He is on Toprol-XL.  He has lasix at home for PRN.  His most recent echocardiogram reveals normalization of his left ventricular ejection fraction of 56 to 60%.  Continue current GDMT for management of CHF.   Monitor weights at home. Low Sodium diet, daily weights.     -Dilated cardiomyopathy: He had a mildly dilated left ventricle noted on echocardiogram with reduced LVEF of 30-35% which was performed in March 2021.  He required CHANTEL prior to his cardioversion, Left ventricular ejection fraction at that time estimated to be 45 to 50%.  At the time of diagnosis, it was felt that his dilated cardiomyopathy may be due to to alcohol abuse or tachycardia mediated.  He has not had an ischemic work-up recently.  He did have a dobutamine stress echo in April 2019 ordered by his primary care office for complaints of shortness of breath.  Of note, the patient had quit drinking after his previous hospitalization last year but does admit to drinking alcohol once again.    -Atrial fibrillation: Previous persistent atrial fibrillation now status post ablation per Dr. Monteiro in December 2021. The patient denies any palpitations.  He had improvement of fatigue, shortness of breath, palpitations with ablation.  He remains anticoagulated and is on amiodarone daily.  Continue current medications.    -Chronic anticoagulation: Initiated in December 2020.  He reports compliance with Xarelto.     -EtOH abuse: The patient previously had stopped drinking alcohol when he was found to be acutely ill in December 2020.He has since resumed drinking, but not as much or often as he had been when he was acutely ill.        Continue current medications.  Low sodium, daily weights.   6 month CHF follow up.  I attest that all portions of this note have been reviewed and updated to reflect the patient's current status.

## 2024-06-17 ENCOUNTER — OFFICE VISIT (OUTPATIENT)
Dept: CARDIOLOGY | Facility: CLINIC | Age: 69
End: 2024-06-17
Payer: MEDICARE

## 2024-06-17 VITALS
SYSTOLIC BLOOD PRESSURE: 103 MMHG | HEIGHT: 67 IN | DIASTOLIC BLOOD PRESSURE: 78 MMHG | BODY MASS INDEX: 25.9 KG/M2 | HEART RATE: 71 BPM | WEIGHT: 165 LBS

## 2024-06-17 DIAGNOSIS — I50.42 CHRONIC COMBINED SYSTOLIC AND DIASTOLIC CONGESTIVE HEART FAILURE: Primary | Chronic | ICD-10-CM

## 2024-06-17 DIAGNOSIS — I42.0 DILATED CARDIOMYOPATHY: Chronic | ICD-10-CM

## 2024-06-17 DIAGNOSIS — Z79.01 CHRONIC ANTICOAGULATION: Chronic | ICD-10-CM

## 2024-06-17 DIAGNOSIS — I48.19 PERSISTENT ATRIAL FIBRILLATION: Chronic | ICD-10-CM

## 2024-06-17 DIAGNOSIS — F10.10 ETOH ABUSE: ICD-10-CM

## 2024-06-17 PROCEDURE — 1159F MED LIST DOCD IN RCRD: CPT | Performed by: NURSE PRACTITIONER

## 2024-06-17 PROCEDURE — 99214 OFFICE O/P EST MOD 30 MIN: CPT | Performed by: NURSE PRACTITIONER

## 2024-06-17 PROCEDURE — 1160F RVW MEDS BY RX/DR IN RCRD: CPT | Performed by: NURSE PRACTITIONER

## 2024-06-17 PROCEDURE — 3078F DIAST BP <80 MM HG: CPT | Performed by: NURSE PRACTITIONER

## 2024-06-17 PROCEDURE — 93000 ELECTROCARDIOGRAM COMPLETE: CPT | Performed by: NURSE PRACTITIONER

## 2024-06-17 PROCEDURE — 3074F SYST BP LT 130 MM HG: CPT | Performed by: NURSE PRACTITIONER

## 2024-06-17 RX ORDER — FUROSEMIDE 40 MG/1
40 TABLET ORAL DAILY PRN
Qty: 30 TABLET | Refills: 3 | Status: CANCELLED | OUTPATIENT
Start: 2024-06-17

## 2024-06-17 RX ORDER — METOPROLOL SUCCINATE 50 MG/1
50 TABLET, EXTENDED RELEASE ORAL DAILY
Qty: 30 TABLET | Refills: 11 | Status: CANCELLED | OUTPATIENT
Start: 2024-06-17

## 2024-06-17 RX ORDER — SACUBITRIL AND VALSARTAN 24; 26 MG/1; MG/1
1 TABLET, FILM COATED ORAL 2 TIMES DAILY
Qty: 60 TABLET | Refills: 11 | Status: CANCELLED | OUTPATIENT
Start: 2024-06-17

## 2024-06-17 RX ORDER — AMIODARONE HYDROCHLORIDE 100 MG/1
100 TABLET ORAL DAILY
Qty: 30 TABLET | Refills: 11 | Status: CANCELLED | OUTPATIENT
Start: 2024-06-17

## 2024-06-17 NOTE — PROGRESS NOTES
Subjective:     Encounter Date:06/17/2024      Patient ID: Ryan Sanchez is a 68 y.o. 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    Chief Complaint: Routine CHF follow up   Congestive Heart Failure  Presents for follow-up visit. Pertinent negatives include no chest pain, chest pressure, edema, fatigue, near-syncope, orthopnea, palpitations, paroxysmal nocturnal dyspnea, shortness of breath or unexpected weight change. The symptoms have been stable. Compliance with total regimen is %. Compliance with diet is %. Compliance with exercise is %. Compliance with medications is %.   Atrial Fibrillation  Presents for follow-up visit. Symptoms are negative for bradycardia, chest pain, hemodynamic instability, hypertension, hypotension, palpitations, shortness of breath, syncope, tachycardia and weakness. The symptoms have been stable. Past medical history includes atrial fibrillation and CHF. There are no medication compliance problems.     Mr. Sanchez was first seen by our service in consultation in December 2020 for new findings of atrial fibrillation with RVR.  He had presented to the hospital with complaints of shortness of breath and found to be in atrial fibrillation, chronicity unknown at that time.  He had reported at least a month of symptoms including shortness of breath, sharp left-sided chest pain.  During that hospital evaluation the patient had echocardiogram and was found to have a reduced left ventricular systolic function.  His EF was estimated to be 40 to 45% at that time.  Mild mitral regurgitation was noted.  During that hospitalization the patient was started on oral anticoagulation, ACE inhibitor, loop diuretic, beta-blocker for management of his systolic heart  "failure, and medication adjustments for improvement of his heart rates.  During his hospitalization a CT angiogram was ordered of his chest for evaluation of his complaints of shortness of breath.  This revealed a left atrial appendage thrombus.  It was recommended that the patient follow-up after discharge with cardiology, however it does not appear that an appointment was made when he was discharged from the hospital.      Subsequently, the patient presented back to the hospital in March 2021 with complaints of shortness of breath and palpitations.  He was evaluated in the emergency department and admitted for further evaluation and care.  The ER note mentions that the patient had been \"converted\" however it appears he was discharged in December in atrial fibrillation without follow-up in between.  It is likely the patient had been in persistent A. fib since that time.  During the hospitalization in March cardiology was consulted due to atrial fibrillation.  The patient had reported his heart pounding and heart palpitations.  He reported easy fatigability and shortness of breath.  He denied chest pain at that time.  He had previously been a heavy alcohol drinker and reported cessation of alcohol use but endorsed drinking a significant amount of coffee each day.  When he presented, he was started on IV amiodarone due to rapid ventricular response on presentation.  He was converted to oral amiodarone at discharge.  His diltiazem was discontinued.  His Xarelto was continued for anticoagulation use and he was continued on his lisinopril and Toprol-XL at discharge.  He was using furosemide as needed.    He presented to the office for follow-up 4/16/21.  He was in atrial fibrillation on EKG his heart rate was 99.  He reported fatigue.  His shortness of breath was improved. He was on anticoagulation with Xarelto. A repeat echocardiogram was obtained during his hospitalization in March.  His left ventricular systolic " function has reduced since December (40-45% down to 30-35%).  He was mildly volume overloaded during his hospitalization in March 2021 and received some IV diuretics during that time as well.    He was transitioned from lisinopril to Entresto. His beta blocker dose was increased and he was instructed to monitor weights, and symptoms, and use lasix as needed. He presented on 4/29/21 after initiating Entresto and was doing well from a CHF standpoint. He was still having some fatigue at that time. He was scheduled for outpatient cardioversion.     He was cardioverted successfully with CHANTEL/Cardioversion on 5/5/21.  He felt improved after discharge. He was doing well and noticed and increase in heart rate with slow weight gain within one month afterwards.  He was taking his Entresto and Toprol as prescribed.  He was found to be back in AF on EKG at follow up. He was referred to EP.  He was seen by Dr. Monteiro - [facility] and ablation was recommended. This was scheduled and completed in December 2021.  His CHANTEL noted improvement of his EF from 31-35% to 46-50%.     He is done well from a rhythm standpoint since his ablation.  A repeat echocardiogram noted normalization of his left ventricular ejection fraction.  The patient has done well on his heart failure medications.  At the time of his acute illness in December 2020, the patient stopped drinking alcohol.  He has since resumed drinking alcohol.      He presents today for routine follow-up.  He was last seen approximately 6 months ago.  He has been stable from a cardiac standpoint.  His girlfriend suddenly passed away recently and he has been dealing with emotional stress related to this.  He denies any increased weight gain, shortness of breath, orthopnea, PND.  He has been compliant with his home medications.  He reports well-controlled blood pressure.  He has intermittent left-sided chest pain lasting a few seconds from time to time but no persistent prolonged episodes.   "He denies any palpitations or known elevated heart rates.    He has had recent weight loss but reports he has \"leveled off\" recently with about a 7 pound weight loss on his home scale.  He has Lasix to use at home on an as-needed basis but reports he takes this maybe once a month.  He has been compliant with medications.  He request refills on some medications that were sent in at his last visit.  He is uncertain what happened with the refills but indicates that he was under the impression he needed to ask for refills from our office.  We will check with [facility].  He has no cardiac complaints today.    The following portions of the patient's history were reviewed and updated as appropriate: allergies, current medications, past family history, past medical history, past social history and past surgical history.     No Known Allergies      Current Outpatient Medications:     amiodarone (PACERONE) 100 MG tablet, Take 1 tablet by mouth Daily., Disp: 90 tablet, Rfl: 3    celecoxib (CeleBREX) 200 MG capsule, Take 1 capsule by mouth As Needed., Disp: , Rfl:     empagliflozin (JARDIANCE) 10 MG tablet tablet, Take  by mouth Daily., Disp: , Rfl:     furosemide (Lasix) 40 MG tablet, Take 1 tablet by mouth Daily As Needed (Weight gain, shortness of breath, swelling)., Disp: 30 tablet, Rfl: 3    loratadine (CLARITIN) 10 MG tablet, Take 1 tablet by mouth As Needed., Disp: , Rfl:     metoprolol succinate XL (TOPROL-XL) 50 MG 24 hr tablet, Take 1 tablet by mouth Daily., Disp: 90 tablet, Rfl: 3    naproxen sodium (ALEVE) 220 MG tablet, Take 1 tablet by mouth 2 (Two) Times a Day As Needed., Disp: , Rfl:     rivaroxaban (XARELTO) 20 MG tablet, Take 1 tablet by mouth Daily., Disp: 90 tablet, Rfl: 3    sacubitril-valsartan (Entresto) 24-26 MG tablet, Take 1 tablet by mouth 2 (Two) Times a Day., Disp: 60 tablet, Rfl: 11    sildenafil (VIAGRA) 50 MG tablet, Take 1 tablet by mouth Daily As Needed., Disp: , Rfl:     TURMERIC PO, Take  " "by mouth., Disp: , Rfl:     zolpidem (AMBIEN) 5 MG tablet, Take 1 tablet by mouth At Night As Needed for Sleep., Disp: , Rfl:       Review of Systems   Constitutional: Negative for chills, fatigue, malaise/fatigue, unexpected weight change and weight gain.   Cardiovascular:  Negative for chest pain, dyspnea on exertion, irregular heartbeat, leg swelling, near-syncope, orthopnea, palpitations, paroxysmal nocturnal dyspnea and syncope.   Respiratory:  Negative for cough, shortness of breath and wheezing.    Hematologic/Lymphatic: Negative for bleeding problem. Bruises/bleeds easily.   Musculoskeletal:  Positive for joint pain.   Gastrointestinal:  Negative for bloating, melena, nausea and vomiting.   Genitourinary:  Negative for hematuria.   Neurological:  Negative for focal weakness, headaches, light-headedness and weakness.   Psychiatric/Behavioral:  Negative for altered mental status.          ECG 12 Lead    Date/Time: 6/17/2024 11:44 AM  Performed by: Kortney Valentino APRN    Authorized by: Kortney Valentino APRN  Comparison: compared with previous ECG from 11/29/2023  Similar to previous ECG  Rhythm: sinus rhythm  Rate: normal  BPM: 71  Conduction: conduction normal  QRS axis: left  Other findings: non-specific ST-T wave changes    Clinical impression: non-specific ECG           /78   Pulse 71   Ht 170.2 cm (67\")   Wt 74.8 kg (165 lb)   BMI 25.84 kg/m²        Objective:     Vitals reviewed.   Constitutional:       General: Awake. Not in acute distress.     Appearance: Normal appearance. Well-developed, well-groomed, overweight and not in distress. Chronically ill-appearing. Not ill-appearing.   HENT:      Head: Normocephalic and atraumatic.   Pulmonary:      Effort: Pulmonary effort is normal.      Breath sounds: Normal breath sounds. No wheezing. No rhonchi. No rales.   Cardiovascular:      Normal rate. Regular rhythm.      Murmurs: There is no murmur.      No gallop.  No rub.   Edema:     Peripheral " edema absent.   Abdominal:      General: Bowel sounds are normal.   Musculoskeletal:      Cervical back: Normal range of motion and neck supple. Skin:     General: Skin is warm and dry.   Neurological:      Mental Status: Alert, oriented to person, place, and time and oriented to person, place and time.   Psychiatric:         Attention and Perception: Attention normal.         Mood and Affect: Mood and affect normal.         Speech: Speech normal.         Behavior: Behavior normal. Behavior is cooperative.         Thought Content: Thought content normal.         Cognition and Memory: Cognition and memory normal.         Judgment: Judgment normal.       Lab Review:    Adult Transthoracic Echo Complete W/ Cont if Necessary Per Protocol  04/01/2022  Interpretation Summary  · Left ventricular systolic function is normal. Left ventricular ejection fraction appears to be 56 - 60%.  · Left ventricular wall thickness is consistent with mild septal asymmetric hypertrophy.  · Normal right ventricular cavity size and systolic function noted.  · No significant valvular abnormalities identified on this study.    12/02/2021 EP Study/Ablation  Notes:  Pulmonary vein isolation successfully achieved by cryo- balloon ablation, with inclusion of the posterior wall, CTI line placement, and spontaneous termination of AF. Dr. Derrick Monteiro     Interpretation Summary  TTE 5/5/2021  Left ventricular systolic function is mildly decreased. Left ventricular ejection fraction appears to be 46 - 50%.  No evidence of a left atrial appendage thrombus.  Trace to mild mitral valve regurgitation is present.     Interpretation Summary  TTE 3/26/21  Left ventricular ejection fraction appears to be 31 - 35%. Left ventricular systolic function is moderately decreased.  Left ventricular diastolic function is consistent with (grade II w/high LAP) pseudonormalization.  The left atrial cavity is mildly dilated.  Mitral annular calcification is present.  Moderate mitral valve regurgitation is present. No significant mitral valve stenosis is present.  No evidence of pulmonary hypertension is present.    CT angiogram chest: 12/26/2020-impression relates left atrial appendage thrombus.        Assessment:          Diagnosis Plan   1. Chronic combined systolic and diastolic congestive heart failure        2. Dilated cardiomyopathy        3. Persistent atrial fibrillation        4. Chronic anticoagulation        5. ETOH abuse                   Plan:       -Chronic combined systolic and diastolic congestive heart failure: Stable.  NYHA class II.  Initially when he was hospitalized, his EF was 40 to 45% (December 2020) and then dropped to 30 to 35% (March 2021).  He was transitioned from Lisinopril to Entresto.  He is on Toprol-XL.  He has lasix at home for PRN.  His most recent echocardiogram reveals normalization of his left ventricular ejection fraction of 56 to 60%.  Continue current GDMT for management of CHF.   Monitor weights at home. Low Sodium diet, daily weights.     -Dilated cardiomyopathy: He had a mildly dilated left ventricle noted on echocardiogram with reduced LVEF of 30-35% which was performed in March 2021.  He required CHANTEL prior to his cardioversion, Left ventricular ejection fraction at that time estimated to be 45 to 50%.  At the time of diagnosis, it was felt that his dilated cardiomyopathy may be due to to alcohol abuse or tachycardia mediated.  He has not had an ischemic work-up recently.  He did have a dobutamine stress echo in April 2019 ordered by his primary care office for complaints of shortness of breath.  Of note, the patient had quit drinking after his previous hospitalization last year but does admit to drinking alcohol once again.    -Atrial fibrillation: Previous persistent atrial fibrillation now status post ablation per Dr. Monteiro in December 2021. The patient denies any palpitations.  He had improvement of fatigue, shortness of  breath, palpitations with ablation.  He remains anticoagulated and is on amiodarone daily.  Continue current medications.    -Chronic anticoagulation: Initiated in December 2020.  He reports compliance with Xarelto.     -EtOH abuse: The patient previously had stopped drinking alcohol when he was found to be acutely ill in December 2020.He has since resumed drinking, but not as much or often as he had been when he was acutely ill.       Continue current medications.  Routine follow-up in 6 months, call sooner if needed  Continue low-sodium diet and daily weights.    I attest that all portions of this note have been reviewed and updated to reflect the patient's current status.

## 2024-12-17 ENCOUNTER — OFFICE VISIT (OUTPATIENT)
Dept: CARDIOLOGY | Facility: CLINIC | Age: 69
End: 2024-12-17
Payer: MEDICARE

## 2024-12-17 VITALS
BODY MASS INDEX: 26.21 KG/M2 | DIASTOLIC BLOOD PRESSURE: 80 MMHG | HEART RATE: 69 BPM | HEIGHT: 67 IN | SYSTOLIC BLOOD PRESSURE: 119 MMHG | WEIGHT: 167 LBS

## 2024-12-17 DIAGNOSIS — I42.0 DILATED CARDIOMYOPATHY: Chronic | ICD-10-CM

## 2024-12-17 DIAGNOSIS — Z79.01 CHRONIC ANTICOAGULATION: Chronic | ICD-10-CM

## 2024-12-17 DIAGNOSIS — Z79.899 ON AMIODARONE THERAPY: Chronic | ICD-10-CM

## 2024-12-17 DIAGNOSIS — I50.42 CHRONIC COMBINED SYSTOLIC AND DIASTOLIC CONGESTIVE HEART FAILURE: Primary | Chronic | ICD-10-CM

## 2024-12-17 DIAGNOSIS — I48.19 PERSISTENT ATRIAL FIBRILLATION: Chronic | ICD-10-CM

## 2024-12-17 RX ORDER — METOPROLOL SUCCINATE 50 MG/1
50 TABLET, EXTENDED RELEASE ORAL DAILY
Qty: 90 TABLET | Refills: 3 | Status: SHIPPED | OUTPATIENT
Start: 2024-12-17

## 2024-12-17 RX ORDER — SACUBITRIL AND VALSARTAN 24; 26 MG/1; MG/1
1 TABLET, FILM COATED ORAL 2 TIMES DAILY
Qty: 180 TABLET | Refills: 3 | Status: SHIPPED | OUTPATIENT
Start: 2024-12-17

## 2024-12-17 RX ORDER — AMIODARONE HYDROCHLORIDE 100 MG/1
100 TABLET ORAL DAILY
Qty: 90 TABLET | Refills: 3 | Status: SHIPPED | OUTPATIENT
Start: 2024-12-17

## 2024-12-17 RX ORDER — FUROSEMIDE 40 MG/1
40 TABLET ORAL DAILY PRN
Qty: 30 TABLET | Refills: 3 | Status: SHIPPED | OUTPATIENT
Start: 2024-12-17

## 2024-12-17 RX ORDER — SACUBITRIL AND VALSARTAN 24; 26 MG/1; MG/1
1 TABLET, FILM COATED ORAL 2 TIMES DAILY
Qty: 60 TABLET | Refills: 3 | OUTPATIENT
Start: 2024-12-17

## 2024-12-17 NOTE — PROGRESS NOTES
Subjective:     Encounter Date: 12/17/2024      Patient ID: Ryan Sanchez is a 69 y.o. 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    Chief Complaint: Routine CHF follow up   Congestive Heart Failure  Presents for follow-up visit. Associated symptoms include shortness of breath. Pertinent negatives include no chest pain, chest pressure, edema, fatigue, near-syncope, orthopnea, palpitations, paroxysmal nocturnal dyspnea or unexpected weight change. The symptoms have been stable. Compliance with total regimen is %. Compliance with diet is %. Compliance with exercise is %. Compliance with medications is %.   Atrial Fibrillation  Presents for follow-up visit. Symptoms include shortness of breath. Symptoms are negative for bradycardia, chest pain, hemodynamic instability, hypertension, hypotension, palpitations, syncope, tachycardia and weakness. The symptoms have been stable. Past medical history includes atrial fibrillation and CHF. There are no medication compliance problems.     Mr. Sanchez was first seen by our service in consultation in December 2020 for new findings of atrial fibrillation with RVR.  He had presented to the hospital with complaints of shortness of breath and found to be in atrial fibrillation, chronicity unknown at that time.  He had reported at least a month of symptoms including shortness of breath, sharp left-sided chest pain.  During that hospital evaluation the patient had echocardiogram and was found to have a reduced left ventricular systolic function.  His EF was estimated to be 40 to 45% at that time.  Mild mitral regurgitation was noted.  During that hospitalization the patient was started on oral anticoagulation, ACE inhibitor, loop diuretic,  "beta-blocker for management of his systolic heart failure, and medication adjustments for improvement of his heart rates.  During his hospitalization a CT angiogram was ordered of his chest for evaluation of his complaints of shortness of breath.  This revealed a left atrial appendage thrombus.  It was recommended that the patient follow-up after discharge with cardiology, however it does not appear that an appointment was made when he was discharged from the hospital.      Subsequently, the patient presented back to the hospital in March 2021 with complaints of shortness of breath and palpitations.  He was evaluated in the emergency department and admitted for further evaluation and care.  The ER note mentions that the patient had been \"converted\" however it appears he was discharged in December in atrial fibrillation without follow-up in between.  It is likely the patient had been in persistent A. fib since that time.  During the hospitalization in March cardiology was consulted due to atrial fibrillation.  The patient had reported his heart pounding and heart palpitations.  He reported easy fatigability and shortness of breath.  He denied chest pain at that time.  He had previously been a heavy alcohol drinker and reported cessation of alcohol use but endorsed drinking a significant amount of coffee each day.  When he presented, he was started on IV amiodarone due to rapid ventricular response on presentation.  He was converted to oral amiodarone at discharge.  His diltiazem was discontinued.  His Xarelto was continued for anticoagulation use and he was continued on his lisinopril and Toprol-XL at discharge.  He was using furosemide as needed.    He presented to the office for follow-up 4/16/21.  He was in atrial fibrillation on EKG his heart rate was 99.  He reported fatigue.  His shortness of breath was improved. He was on anticoagulation with Xarelto. A repeat echocardiogram was obtained during his " hospitalization in March.  His left ventricular systolic function has reduced since December (40-45% down to 30-35%).  He was mildly volume overloaded during his hospitalization in March 2021 and received some IV diuretics during that time as well.    He was transitioned from lisinopril to Entresto. His beta blocker dose was increased and he was instructed to monitor weights, and symptoms, and use lasix as needed. He presented on 4/29/21 after initiating Entresto and was doing well from a CHF standpoint. He was still having some fatigue at that time. He was scheduled for outpatient cardioversion.     He was cardioverted successfully with CHANTEL/Cardioversion on 5/5/21.  He felt improved after discharge. He was doing well and noticed and increase in heart rate with slow weight gain within one month afterwards.  He was taking his Entresto and Toprol as prescribed.  He was found to be back in AF on EKG at follow up. He was referred to EP.  He was seen by Dr. Monteiro - [facility] and ablation was recommended. This was scheduled and completed in December 2021.  His CHANTEL noted improvement of his EF from 31-35% to 46-50%.     He is done well from a rhythm standpoint since his ablation.  A repeat echocardiogram noted normalization of his left ventricular ejection fraction.  The patient has done well on his heart failure medications.  At the time of his acute illness in December 2020, the patient stopped drinking alcohol.  He has since resumed drinking alcohol.      He presents today for routine follow-up.  He reports that he has been stable from a cardiac standpoint.  He has been dealing with grief through the holidays as he suddenly lost his girlfriend several months ago.  He reports compliance with his home medications.  He denies any rapid changes in his weights but has noticed some increased shortness of breath over the last few days.  He took Lasix a few times last week but has not taken any this week so far.  He denies any  elevations in his blood pressure or heart rate that he is aware of.  He denies any chest pain or chest tightness.    His cardiomyopathy was previously felt to be related to findings of stress cardiomyopathy and tachycardia.  He did have an ischemic evaluation in 2019 with stress testing but has never had coronary angiography.  He denies weight changes orthopnea, or PND.    The following portions of the patient's history were reviewed and updated as appropriate: allergies, current medications, past family history, past medical history, past social history and past surgical history.     No Known Allergies      Current Outpatient Medications:     amiodarone (PACERONE) 100 MG tablet, Take 1 tablet by mouth Daily., Disp: 90 tablet, Rfl: 3    celecoxib (CeleBREX) 200 MG capsule, Take 1 capsule by mouth As Needed., Disp: , Rfl:     empagliflozin (JARDIANCE) 10 MG tablet tablet, Take 1 tablet by mouth Daily., Disp: 90 tablet, Rfl: 3    furosemide (Lasix) 40 MG tablet, Take 1 tablet by mouth Daily As Needed (Weight gain, shortness of breath, swelling)., Disp: 30 tablet, Rfl: 3    loratadine (CLARITIN) 10 MG tablet, Take 1 tablet by mouth As Needed., Disp: , Rfl:     metoprolol succinate XL (TOPROL-XL) 50 MG 24 hr tablet, Take 1 tablet by mouth Daily., Disp: 90 tablet, Rfl: 3    naproxen sodium (ALEVE) 220 MG tablet, Take 1 tablet by mouth 2 (Two) Times a Day As Needed., Disp: , Rfl:     rivaroxaban (XARELTO) 20 MG tablet, Take 1 tablet by mouth Daily., Disp: 90 tablet, Rfl: 3    sacubitril-valsartan (Entresto) 24-26 MG tablet, Take 1 tablet by mouth 2 (Two) Times a Day., Disp: 180 tablet, Rfl: 3    sildenafil (VIAGRA) 50 MG tablet, Take 1 tablet by mouth Daily As Needed., Disp: , Rfl:     TURMERIC PO, Take  by mouth., Disp: , Rfl:     zolpidem (AMBIEN) 5 MG tablet, Take 1 tablet by mouth At Night As Needed for Sleep., Disp: , Rfl:       Review of Systems   Constitutional: Negative for chills, fatigue, malaise/fatigue,  "unexpected weight change and weight gain.   Cardiovascular:  Negative for chest pain, dyspnea on exertion, irregular heartbeat, leg swelling, near-syncope, orthopnea, palpitations, paroxysmal nocturnal dyspnea and syncope.   Respiratory:  Positive for shortness of breath. Negative for cough and wheezing.    Hematologic/Lymphatic: Negative for bleeding problem. Bruises/bleeds easily.   Musculoskeletal:  Positive for joint pain.   Gastrointestinal:  Negative for bloating, melena, nausea and vomiting.   Genitourinary:  Negative for hematuria.   Neurological:  Negative for focal weakness, headaches, light-headedness and weakness.   Psychiatric/Behavioral:  Negative for altered mental status.          ECG 12 Lead    Date/Time: 12/17/2024 12:46 PM  Performed by: Kortney Valentino APRN    Authorized by: Kortney Valentino APRN  Comparison: compared with previous ECG from 6/17/2024  Similar to previous ECG  Rhythm: sinus rhythm  Rate: normal  BPM: 69  Conduction: left anterior fascicular block  QRS axis: left    Clinical impression: abnormal EKG           /80   Pulse 69   Ht 170.2 cm (67\")   Wt 75.8 kg (167 lb)   BMI 26.16 kg/m²        Objective:     Vitals reviewed.   Constitutional:       General: Awake. Not in acute distress.     Appearance: Normal appearance. Well-developed, well-groomed, overweight and not in distress. Chronically ill-appearing. Not ill-appearing.   HENT:      Head: Normocephalic and atraumatic.   Pulmonary:      Effort: Pulmonary effort is normal.      Breath sounds: Normal breath sounds. No wheezing. No rhonchi. No rales.   Cardiovascular:      Normal rate. Regular rhythm.      Murmurs: There is no murmur.      No gallop.  No rub.   Edema:     Peripheral edema absent.   Abdominal:      General: Bowel sounds are normal.   Musculoskeletal:      Cervical back: Normal range of motion and neck supple. Skin:     General: Skin is warm and dry.   Neurological:      Mental Status: Alert, oriented to " person, place, and time and oriented to person, place and time.   Psychiatric:         Attention and Perception: Attention normal.         Mood and Affect: Mood and affect normal.         Speech: Speech normal.         Behavior: Behavior normal. Behavior is cooperative.         Thought Content: Thought content normal.         Cognition and Memory: Cognition and memory normal.         Judgment: Judgment normal.       Lab Review:    Adult Transthoracic Echo Complete W/ Cont if Necessary Per Protocol  04/01/2022  Interpretation Summary  · Left ventricular systolic function is normal. Left ventricular ejection fraction appears to be 56 - 60%.  · Left ventricular wall thickness is consistent with mild septal asymmetric hypertrophy.  · Normal right ventricular cavity size and systolic function noted.  · No significant valvular abnormalities identified on this study.    12/02/2021 EP Study/Ablation  Notes:  Pulmonary vein isolation successfully achieved by cryo- balloon ablation, with inclusion of the posterior wall, CTI line placement, and spontaneous termination of AF. Dr. Derrick Monteiro     Interpretation Summary  TTE 5/5/2021  Left ventricular systolic function is mildly decreased. Left ventricular ejection fraction appears to be 46 - 50%.  No evidence of a left atrial appendage thrombus.  Trace to mild mitral valve regurgitation is present.     Interpretation Summary  TTE 3/26/21  Left ventricular ejection fraction appears to be 31 - 35%. Left ventricular systolic function is moderately decreased.  Left ventricular diastolic function is consistent with (grade II w/high LAP) pseudonormalization.  The left atrial cavity is mildly dilated.  Mitral annular calcification is present. Moderate mitral valve regurgitation is present. No significant mitral valve stenosis is present.  No evidence of pulmonary hypertension is present.    CT angiogram chest: 12/26/2020-impression relates left atrial appendage thrombus.         Assessment:          Diagnosis Plan   1. Chronic combined systolic and diastolic congestive heart failure        2. Dilated cardiomyopathy        3. Persistent atrial fibrillation        4. Chronic anticoagulation        5. On amiodarone therapy             Plan:       -Chronic combined systolic and diastolic congestive heart failure: Stable.  NYHA class II.  Initially when he was hospitalized, his EF was 40 to 45% (December 2020) and then dropped to 30 to 35% (March 2021).  He was transitioned from Lisinopril to Entresto.  He is on Toprol-XL.  He has lasix at home for PRN.  His most recent echocardiogram reveals normalization of his left ventricular ejection fraction of 56 to 60%.  Continue current GDMT for management of CHF.   Monitor weights at home. Low Sodium diet, daily weights. With shortness of breath recently I have suggested use of lasix daily for 3 days and reassess symptoms.     -Dilated cardiomyopathy: He had a mildly dilated left ventricle noted on echocardiogram with reduced LVEF of 30-35% which was performed in March 2021.  He required CHANTEL prior to his cardioversion, Left ventricular ejection fraction at that time estimated to be 45 to 50%.  At the time of diagnosis, it was felt that his dilated cardiomyopathy may be due to to alcohol abuse or tachycardia mediated.  He has not had an ischemic work-up recently.  He did have a dobutamine stress echo in April 2019 ordered by his primary care office for complaints of shortness of breath.      -Atrial fibrillation: Previous persistent atrial fibrillation now status post ablation per Dr. Monteiro in December 2021. The patient denies any palpitations.  He had improvement of fatigue, shortness of breath, palpitations with ablation.  He remains anticoagulated and is on amiodarone daily.  Continue current medications.    -Chronic anticoagulation: Initiated in December 2020.  He reports compliance with Xarelto.     Continue routine cardiac  medications.  Refill sent to the pharmacy.  Trial Lasix 40 mg daily for 3 days and reassess symptoms.  If he has continued shortness of breath without improvement call our office to reschedule follow-up and consider other sources.    I attest that all portions of this note have been reviewed and updated to reflect the patient's current status.

## 2025-03-17 ENCOUNTER — OFFICE VISIT (OUTPATIENT)
Dept: CARDIOLOGY | Facility: CLINIC | Age: 70
End: 2025-03-17
Payer: MEDICARE

## 2025-03-17 VITALS
DIASTOLIC BLOOD PRESSURE: 64 MMHG | HEIGHT: 67 IN | OXYGEN SATURATION: 94 % | HEART RATE: 59 BPM | BODY MASS INDEX: 25.77 KG/M2 | WEIGHT: 164.2 LBS | SYSTOLIC BLOOD PRESSURE: 118 MMHG

## 2025-03-17 DIAGNOSIS — Z79.899 ON AMIODARONE THERAPY: Chronic | ICD-10-CM

## 2025-03-17 DIAGNOSIS — R06.09 DOE (DYSPNEA ON EXERTION): Primary | ICD-10-CM

## 2025-03-17 DIAGNOSIS — Z79.01 CHRONIC ANTICOAGULATION: Chronic | ICD-10-CM

## 2025-03-17 DIAGNOSIS — I42.0 DILATED CARDIOMYOPATHY: Chronic | ICD-10-CM

## 2025-03-17 DIAGNOSIS — Z98.890 HISTORY OF CARDIAC RADIOFREQUENCY ABLATION: Chronic | ICD-10-CM

## 2025-03-17 DIAGNOSIS — Z79.899 HIGH RISK MEDICATION USE: ICD-10-CM

## 2025-03-17 DIAGNOSIS — I50.42 CHRONIC COMBINED SYSTOLIC AND DIASTOLIC CONGESTIVE HEART FAILURE: Chronic | ICD-10-CM

## 2025-03-17 DIAGNOSIS — I48.19 PERSISTENT ATRIAL FIBRILLATION: Chronic | ICD-10-CM

## 2025-03-17 RX ORDER — LISINOPRIL 2.5 MG/1
1 TABLET ORAL DAILY
COMMUNITY

## 2025-03-17 NOTE — PROGRESS NOTES
Subjective:     Encounter Date: 03/17/2025      Patient ID: Ryan Sanchez is a 69 y.o. 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    Chief Complaint: Routine CHF follow up/MAYEN   Congestive Heart Failure  Presents for follow-up visit. Associated symptoms include shortness of breath. Pertinent negatives include no chest pain, chest pressure, edema, fatigue, near-syncope, orthopnea, palpitations, paroxysmal nocturnal dyspnea or unexpected weight change. The symptoms have been stable. Compliance with total regimen is %. Compliance with diet is %. Compliance with exercise is %. Compliance with medications is %.   Atrial Fibrillation  Presents for follow-up visit. Symptoms include shortness of breath. Symptoms are negative for bradycardia, chest pain, hemodynamic instability, hypertension, hypotension, palpitations, syncope, tachycardia and weakness. The symptoms have been stable. Past medical history includes atrial fibrillation and CHF. There are no medication compliance problems.     Mr. Sanchez was first seen by our service in consultation in December 2020 for new findings of atrial fibrillation with RVR.  He had presented to the hospital with complaints of shortness of breath and found to be in atrial fibrillation, chronicity unknown at that time.  He had reported at least a month of symptoms including shortness of breath, sharp left-sided chest pain.  During that hospital evaluation the patient had echocardiogram and was found to have a reduced left ventricular systolic function.  His EF was estimated to be 40 to 45% at that time.  Mild mitral regurgitation was noted.  During that hospitalization the patient was started on oral anticoagulation, ACE inhibitor, loop diuretic,  "beta-blocker for management of his systolic heart failure, and medication adjustments for improvement of his heart rates.  During his hospitalization a CT angiogram was ordered of his chest for evaluation of his complaints of shortness of breath.  This revealed a left atrial appendage thrombus.  It was recommended that the patient follow-up after discharge with cardiology, however it does not appear that an appointment was made when he was discharged from the hospital.      Subsequently, the patient presented back to the hospital in March 2021 with complaints of shortness of breath and palpitations.  He was evaluated in the emergency department and admitted for further evaluation and care.  The ER note mentions that the patient had been \"converted\" however it appears he was discharged in December in atrial fibrillation without follow-up in between.  It is likely the patient had been in persistent A. fib since that time.  During the hospitalization in March cardiology was consulted due to atrial fibrillation.  The patient had reported his heart pounding and heart palpitations.  He reported easy fatigability and shortness of breath.  He denied chest pain at that time.  He had previously been a heavy alcohol drinker and reported cessation of alcohol use but endorsed drinking a significant amount of coffee each day.  When he presented, he was started on IV amiodarone due to rapid ventricular response on presentation.  He was converted to oral amiodarone at discharge.  His diltiazem was discontinued.  His Xarelto was continued for anticoagulation use and he was continued on his lisinopril and Toprol-XL at discharge.  He was using furosemide as needed.    He presented to the office for follow-up 4/16/21.  He was in atrial fibrillation on EKG his heart rate was 99.  He reported fatigue.  His shortness of breath was improved. He was on anticoagulation with Xarelto. A repeat echocardiogram was obtained during his " hospitalization in March.  His left ventricular systolic function has reduced since December (40-45% down to 30-35%).  He was mildly volume overloaded during his hospitalization in March 2021 and received some IV diuretics during that time as well.    He was transitioned from lisinopril to Entresto. His beta blocker dose was increased and he was instructed to monitor weights, and symptoms, and use lasix as needed. He presented on 4/29/21 after initiating Entresto and was doing well from a CHF standpoint. He was still having some fatigue at that time. He was scheduled for outpatient cardioversion.     He was cardioverted successfully with CHANTEL/Cardioversion on 5/5/21.  He felt improved after discharge. He was doing well and noticed and increase in heart rate with slow weight gain within one month afterwards.  He was taking his Entresto and Toprol as prescribed.  He was found to be back in AF on EKG at follow up. He was referred to EP.  He was seen by Dr. Monteiro - [facility] and ablation was recommended. This was scheduled and completed in December 2021.  His CHANTEL noted improvement of his EF from 31-35% to 46-50%.     He is done well from a rhythm standpoint since his ablation.  A repeat echocardiogram noted normalization of his left ventricular ejection fraction.  The patient has done well on his heart failure medications.  At the time of his acute illness in December 2020, the patient stopped drinking alcohol.  He has since resumed drinking alcohol.      He presents today for routine follow-up.  He reports that he has been stable from a cardiac standpoint.  He has been stable since his last follow-up.  He does report some shortness of breath but uncertain if this is related to heart failure or not.  He reports compliance with his home medications.  His weights remain stable.  He continues to use Lasix on an as needed basis and reports using this maybe 4-5 times a month.   He denies any elevations in his blood pressure or  heart rate that he is aware of.  He denies any chest pain or chest tightness.  He did have PFTs performed a few years ago and has been managed on amiodarone.    His cardiomyopathy was previously felt to be related to findings of stress cardiomyopathy and tachycardia.  He did have an ischemic evaluation in 2019 with stress testing but has never had coronary angiography.  He denies weight changes orthopnea, or PND.    The following portions of the patient's history were reviewed and updated as appropriate: allergies, current medications, past family history, past medical history, past social history and past surgical history.     No Known Allergies      Current Outpatient Medications:     amiodarone (PACERONE) 100 MG tablet, Take 1 tablet by mouth Daily., Disp: 90 tablet, Rfl: 3    celecoxib (CeleBREX) 200 MG capsule, Take 1 capsule by mouth As Needed., Disp: , Rfl:     empagliflozin (JARDIANCE) 10 MG tablet tablet, Take 1 tablet by mouth Daily., Disp: 90 tablet, Rfl: 3    furosemide (Lasix) 40 MG tablet, Take 1 tablet by mouth Daily As Needed (Weight gain, shortness of breath, swelling)., Disp: 30 tablet, Rfl: 3    loratadine (CLARITIN) 10 MG tablet, Take 1 tablet by mouth As Needed., Disp: , Rfl:     metoprolol succinate XL (TOPROL-XL) 50 MG 24 hr tablet, Take 1 tablet by mouth Daily., Disp: 90 tablet, Rfl: 3    naproxen sodium (ALEVE) 220 MG tablet, Take 1 tablet by mouth 2 (Two) Times a Day As Needed., Disp: , Rfl:     rivaroxaban (XARELTO) 20 MG tablet, Take 1 tablet by mouth Daily., Disp: 90 tablet, Rfl: 3    sacubitril-valsartan (Entresto) 24-26 MG tablet, Take 1 tablet by mouth 2 (Two) Times a Day., Disp: 180 tablet, Rfl: 3    sildenafil (VIAGRA) 50 MG tablet, Take 1 tablet by mouth Daily As Needed., Disp: , Rfl:     TURMERIC PO, Take  by mouth., Disp: , Rfl:     zolpidem (AMBIEN) 5 MG tablet, Take 1 tablet by mouth At Night As Needed for Sleep., Disp: , Rfl:     lisinopril (PRINIVIL,ZESTRIL) 2.5 MG tablet,  "Take 1 tablet by mouth Daily., Disp: , Rfl:       Review of Systems   Constitutional: Negative for chills, fatigue, malaise/fatigue, unexpected weight change and weight gain.   Cardiovascular:  Negative for chest pain, dyspnea on exertion, irregular heartbeat, leg swelling, near-syncope, orthopnea, palpitations, paroxysmal nocturnal dyspnea and syncope.   Respiratory:  Positive for shortness of breath. Negative for cough and wheezing.    Hematologic/Lymphatic: Negative for bleeding problem. Bruises/bleeds easily.   Musculoskeletal:  Positive for joint pain.   Gastrointestinal:  Negative for bloating, melena, nausea and vomiting.   Genitourinary:  Negative for hematuria.   Neurological:  Negative for focal weakness, headaches, light-headedness and weakness.   Psychiatric/Behavioral:  Negative for altered mental status.        Procedures     /64   Pulse 59   Ht 170.2 cm (67\")   Wt 74.5 kg (164 lb 3.2 oz)   SpO2 94%   BMI 25.72 kg/m²        Objective:     Vitals reviewed.   Constitutional:       General: Awake. Not in acute distress.     Appearance: Normal appearance. Well-developed, well-groomed, overweight and not in distress. Chronically ill-appearing. Not ill-appearing.   HENT:      Head: Normocephalic and atraumatic.   Pulmonary:      Effort: Pulmonary effort is normal.      Breath sounds: Normal breath sounds. No wheezing. No rhonchi. No rales.   Cardiovascular:      Normal rate. Regular rhythm.      Murmurs: There is no murmur.      No gallop.  No rub.   Edema:     Peripheral edema absent.   Abdominal:      General: Bowel sounds are normal.   Musculoskeletal:      Cervical back: Normal range of motion and neck supple. Skin:     General: Skin is warm and dry.   Neurological:      Mental Status: Alert, oriented to person, place, and time and oriented to person, place and time.   Psychiatric:         Attention and Perception: Attention normal.         Mood and Affect: Mood and affect normal.         " Speech: Speech normal.         Behavior: Behavior normal. Behavior is cooperative.         Thought Content: Thought content normal.         Cognition and Memory: Cognition and memory normal.         Judgment: Judgment normal.       Lab Review:    Adult Transthoracic Echo Complete W/ Cont if Necessary Per Protocol  04/01/2022  Interpretation Summary  · Left ventricular systolic function is normal. Left ventricular ejection fraction appears to be 56 - 60%.  · Left ventricular wall thickness is consistent with mild septal asymmetric hypertrophy.  · Normal right ventricular cavity size and systolic function noted.  · No significant valvular abnormalities identified on this study.    12/02/2021 EP Study/Ablation  Notes:  Pulmonary vein isolation successfully achieved by cryo- balloon ablation, with inclusion of the posterior wall, CTI line placement, and spontaneous termination of AF. Dr. Derrick Monteiro     Interpretation Summary  TTE 5/5/2021  Left ventricular systolic function is mildly decreased. Left ventricular ejection fraction appears to be 46 - 50%.  No evidence of a left atrial appendage thrombus.  Trace to mild mitral valve regurgitation is present.     Interpretation Summary  TTE 3/26/21  Left ventricular ejection fraction appears to be 31 - 35%. Left ventricular systolic function is moderately decreased.  Left ventricular diastolic function is consistent with (grade II w/high LAP) pseudonormalization.  The left atrial cavity is mildly dilated.  Mitral annular calcification is present. Moderate mitral valve regurgitation is present. No significant mitral valve stenosis is present.  No evidence of pulmonary hypertension is present.    CT angiogram chest: 12/26/2020-impression relates left atrial appendage thrombus.        Assessment:          Diagnosis Plan   1. MAYEN (dyspnea on exertion)  Complete PFT - Pre & Post Bronchodilator      2. High risk medication use  Complete PFT - Pre & Post Bronchodilator      3.  Chronic combined systolic and diastolic congestive heart failure        4. Dilated cardiomyopathy        5. Persistent atrial fibrillation        6. On amiodarone therapy        7. History of cardiac radiofrequency ablation        8. Chronic anticoagulation             Plan:       -Chronic combined systolic and diastolic congestive heart failure: Stable.  NYHA class II.  Initially when he was hospitalized, his EF was 40 to 45% (December 2020) and then dropped to 30 to 35% (March 2021).  He was transitioned from Lisinopril to Entresto.  He is on Toprol-XL.  He has lasix at home for PRN.  His most recent echocardiogram reveals normalization of his left ventricular ejection fraction of 56 to 60%.  Continue current GDMT for management of CHF.   Monitor weights at home. Low Sodium diet, daily weights. We will repeat PFT given shortness of breath that seems to be characteristically different than CHF symptoms.      -Dilated cardiomyopathy: He had a mildly dilated left ventricle noted on echocardiogram with reduced LVEF of 30-35% which was performed in March 2021.  He required CHANTEL prior to his cardioversion, Left ventricular ejection fraction at that time estimated to be 45 to 50%.  At the time of diagnosis, it was felt that his dilated cardiomyopathy may be due to to alcohol abuse or tachycardia mediated.  He has not had an ischemic work-up recently.  He did have a dobutamine stress echo in April 2019 ordered by his primary care office for complaints of shortness of breath.      -Atrial fibrillation-ablation: Previous persistent atrial fibrillation now status post ablation per Dr. Monteiro in December 2021. The patient denies any palpitations.  He had improvement of fatigue, shortness of breath, palpitations with ablation.  He remains anticoagulated and is on amiodarone daily for rhythm management.  Continue current medications. PFT repeat.    -Chronic anticoagulation: Initiated in December 2020.  He reports compliance  with Xarelto.       PFT testing - due to shortness of breath.   Continue current medications.   Consider repeat echo if PFT testing is normal.  6-month follow-up.  Will call the patient after results of his PFT testing are available.    I attest that all portions of this note have been reviewed and updated to reflect the patient's current status.

## 2025-03-27 ENCOUNTER — HOSPITAL ENCOUNTER (OUTPATIENT)
Dept: PULMONOLOGY | Facility: HOSPITAL | Age: 70
Discharge: HOME | End: 2025-03-27
Admitting: NURSE PRACTITIONER
Payer: MEDICARE

## 2025-03-27 DIAGNOSIS — Z79.899 HIGH RISK MEDICATION USE: ICD-10-CM

## 2025-03-27 DIAGNOSIS — R06.09 DOE (DYSPNEA ON EXERTION): ICD-10-CM

## 2025-03-27 PROCEDURE — 94060 EVALUATION OF WHEEZING: CPT

## 2025-03-27 PROCEDURE — 94729 DIFFUSING CAPACITY: CPT

## 2025-03-27 PROCEDURE — 94726 PLETHYSMOGRAPHY LUNG VOLUMES: CPT

## 2025-03-27 RX ORDER — ALBUTEROL SULFATE 0.83 MG/ML
2.5 SOLUTION RESPIRATORY (INHALATION) ONCE
Status: COMPLETED | OUTPATIENT
Start: 2025-03-27 | End: 2025-03-27

## 2025-03-27 RX ADMIN — ALBUTEROL SULFATE 2.5 MG: 2.5 SOLUTION RESPIRATORY (INHALATION) at 13:51

## 2025-03-31 DIAGNOSIS — R06.09 DOE (DYSPNEA ON EXERTION): Primary | ICD-10-CM

## 2025-03-31 DIAGNOSIS — I50.42 CHRONIC COMBINED SYSTOLIC AND DIASTOLIC CONGESTIVE HEART FAILURE: ICD-10-CM

## 2025-05-14 ENCOUNTER — HOSPITAL ENCOUNTER (OUTPATIENT)
Dept: CARDIOLOGY | Facility: HOSPITAL | Age: 70
Discharge: HOME | End: 2025-05-14
Admitting: NURSE PRACTITIONER
Payer: MEDICARE

## 2025-05-14 VITALS
SYSTOLIC BLOOD PRESSURE: 118 MMHG | DIASTOLIC BLOOD PRESSURE: 64 MMHG | HEIGHT: 67 IN | WEIGHT: 164 LBS | BODY MASS INDEX: 25.74 KG/M2

## 2025-05-14 DIAGNOSIS — I50.42 CHRONIC COMBINED SYSTOLIC AND DIASTOLIC CONGESTIVE HEART FAILURE: ICD-10-CM

## 2025-05-14 DIAGNOSIS — R06.09 DOE (DYSPNEA ON EXERTION): ICD-10-CM

## 2025-05-14 PROCEDURE — 93306 TTE W/DOPPLER COMPLETE: CPT

## 2025-05-16 LAB
AORTIC DIMENSIONLESS INDEX: 0.86 (DI)
AV MEAN PRESS GRAD SYS DOP V1V2: 4 MMHG
AV VMAX SYS DOP: 130 CM/SEC
BH CV ECHO MEAS - AO MAX PG: 6.8 MMHG
BH CV ECHO MEAS - AO ROOT DIAM: 3 CM
BH CV ECHO MEAS - AO V2 VTI: 26.5 CM
BH CV ECHO MEAS - AVA(I,D): 1.95 CM2
BH CV ECHO MEAS - EDV(CUBED): 46.3 ML
BH CV ECHO MEAS - EDV(MOD-SP2): 62.3 ML
BH CV ECHO MEAS - EDV(MOD-SP4): 60.6 ML
BH CV ECHO MEAS - EF(MOD-SP2): 63.6 %
BH CV ECHO MEAS - EF(MOD-SP4): 61.1 %
BH CV ECHO MEAS - ESV(CUBED): 5.7 ML
BH CV ECHO MEAS - ESV(MOD-SP2): 22.7 ML
BH CV ECHO MEAS - ESV(MOD-SP4): 23.6 ML
BH CV ECHO MEAS - FS: 50.1 %
BH CV ECHO MEAS - IVS/LVPW: 1.11 CM
BH CV ECHO MEAS - IVSD: 1.19 CM
BH CV ECHO MEAS - LA DIMENSION: 4.5 CM
BH CV ECHO MEAS - LAT PEAK E' VEL: 7.6 CM/SEC
BH CV ECHO MEAS - LV DIASTOLIC VOL/BSA (35-75): 32.6 CM2
BH CV ECHO MEAS - LV MASS(C)D: 128.7 GRAMS
BH CV ECHO MEAS - LV MAX PG: 5.3 MMHG
BH CV ECHO MEAS - LV MEAN PG: 3 MMHG
BH CV ECHO MEAS - LV SYSTOLIC VOL/BSA (12-30): 12.7 CM2
BH CV ECHO MEAS - LV V1 MAX: 115 CM/SEC
BH CV ECHO MEAS - LV V1 VTI: 22.8 CM
BH CV ECHO MEAS - LVIDD: 3.6 CM
BH CV ECHO MEAS - LVIDS: 1.79 CM
BH CV ECHO MEAS - LVOT AREA: 2.27 CM2
BH CV ECHO MEAS - LVOT DIAM: 1.7 CM
BH CV ECHO MEAS - LVPWD: 1.07 CM
BH CV ECHO MEAS - MED PEAK E' VEL: 6.4 CM/SEC
BH CV ECHO MEAS - MV A MAX VEL: 51.9 CM/SEC
BH CV ECHO MEAS - MV DEC TIME: 0.26 SEC
BH CV ECHO MEAS - MV E MAX VEL: 73.9 CM/SEC
BH CV ECHO MEAS - MV E/A: 1.42
BH CV ECHO MEAS - SV(LVOT): 51.8 ML
BH CV ECHO MEAS - SV(MOD-SP2): 39.6 ML
BH CV ECHO MEAS - SV(MOD-SP4): 37 ML
BH CV ECHO MEAS - SVI(LVOT): 27.8 ML/M2
BH CV ECHO MEAS - SVI(MOD-SP2): 21.3 ML/M2
BH CV ECHO MEAS - SVI(MOD-SP4): 19.9 ML/M2
BH CV ECHO MEASUREMENTS AVERAGE E/E' RATIO: 10.56
BH CV XLRA - RV BASE: 3.6 CM
BH CV XLRA - RV LENGTH: 4.8 CM
BH CV XLRA - RV MID: 2.9 CM
LEFT ATRIUM VOLUME INDEX: 34.9 ML/M2
LEFT ATRIUM VOLUME: 65 ML
LV EF BIPLANE MOD: 61.6 %

## 2025-05-23 DIAGNOSIS — R06.09 DOE (DYSPNEA ON EXERTION): Primary | ICD-10-CM

## 2025-05-23 DIAGNOSIS — I42.0 DILATED CARDIOMYOPATHY: ICD-10-CM

## 2025-05-23 DIAGNOSIS — I50.42 CHRONIC COMBINED SYSTOLIC AND DIASTOLIC CONGESTIVE HEART FAILURE: ICD-10-CM

## 2025-05-23 RX ORDER — SODIUM CHLORIDE 9 MG/ML
40 INJECTION, SOLUTION INTRAVENOUS AS NEEDED
OUTPATIENT
Start: 2025-05-23

## 2025-05-23 RX ORDER — SODIUM CHLORIDE 0.9 % (FLUSH) 0.9 %
10 SYRINGE (ML) INJECTION EVERY 12 HOURS SCHEDULED
OUTPATIENT
Start: 2025-05-23

## 2025-05-23 RX ORDER — SODIUM CHLORIDE 0.9 % (FLUSH) 0.9 %
10 SYRINGE (ML) INJECTION AS NEEDED
OUTPATIENT
Start: 2025-05-23

## 2025-07-10 ENCOUNTER — HOSPITAL ENCOUNTER (OUTPATIENT)
Facility: HOSPITAL | Age: 70
Discharge: HOME | End: 2025-07-10
Payer: MEDICARE

## 2025-07-10 VITALS
SYSTOLIC BLOOD PRESSURE: 123 MMHG | HEART RATE: 65 BPM | HEIGHT: 67 IN | WEIGHT: 165 LBS | DIASTOLIC BLOOD PRESSURE: 86 MMHG | BODY MASS INDEX: 25.9 KG/M2

## 2025-07-10 DIAGNOSIS — I50.42 CHRONIC COMBINED SYSTOLIC AND DIASTOLIC CONGESTIVE HEART FAILURE: ICD-10-CM

## 2025-07-10 DIAGNOSIS — I42.0 DILATED CARDIOMYOPATHY: ICD-10-CM

## 2025-07-10 DIAGNOSIS — R06.09 DOE (DYSPNEA ON EXERTION): ICD-10-CM

## 2025-07-10 LAB
BH CV NUCLEAR PRIOR STUDY: 3
BH CV REST NUCLEAR ISOTOPE DOSE: 19.3 MCI
BH CV STRESS BP STAGE 1: NORMAL
BH CV STRESS COMMENTS STAGE 1: NORMAL
BH CV STRESS DOSE REGADENOSON STAGE 1: 0.4
BH CV STRESS DURATION MIN STAGE 1: 0
BH CV STRESS DURATION SEC STAGE 1: 10
BH CV STRESS HR STAGE 1: 86
BH CV STRESS NUCLEAR ISOTOPE DOSE: 19.3 MCI
BH CV STRESS PROTOCOL 1: NORMAL
BH CV STRESS RECOVERY BP: NORMAL MMHG
BH CV STRESS RECOVERY HR: 79 BPM
BH CV STRESS STAGE 1: 1
MAXIMAL PREDICTED HEART RATE: 150 BPM
PERCENT MAX PREDICTED HR: 57.33 %
STRESS BASELINE BP: NORMAL MMHG
STRESS BASELINE HR: 76 BPM
STRESS PERCENT HR: 67 %
STRESS POST EXERCISE DUR MIN: 0 MIN
STRESS POST EXERCISE DUR SEC: 10 SEC
STRESS POST PEAK BP: NORMAL MMHG
STRESS POST PEAK HR: 86 BPM
STRESS TARGET HR: 128 BPM

## 2025-07-10 PROCEDURE — A9555 RB82 RUBIDIUM: HCPCS | Performed by: NURSE PRACTITIONER

## 2025-07-10 PROCEDURE — 78431 MYOCRD IMG PET RST&STRS CT: CPT

## 2025-07-10 PROCEDURE — 34310000005 RUBIDIUM CHLORIDE: Performed by: NURSE PRACTITIONER

## 2025-07-10 PROCEDURE — 93017 CV STRESS TEST TRACING ONLY: CPT

## 2025-07-10 PROCEDURE — 25010000002 REGADENOSON 0.4 MG/5ML SOLUTION: Performed by: INTERNAL MEDICINE

## 2025-07-10 RX ORDER — REGADENOSON 0.08 MG/ML
0.4 INJECTION, SOLUTION INTRAVENOUS ONCE
Status: COMPLETED | OUTPATIENT
Start: 2025-07-10 | End: 2025-07-10

## 2025-07-10 RX ADMIN — REGADENOSON 0.4 MG: 0.08 INJECTION, SOLUTION INTRAVENOUS at 10:14

## 2025-08-15 ENCOUNTER — OFFICE VISIT (OUTPATIENT)
Dept: CARDIOLOGY | Facility: CLINIC | Age: 70
End: 2025-08-15
Payer: MEDICARE

## 2025-08-15 VITALS
HEART RATE: 63 BPM | SYSTOLIC BLOOD PRESSURE: 120 MMHG | BODY MASS INDEX: 25.9 KG/M2 | DIASTOLIC BLOOD PRESSURE: 64 MMHG | HEIGHT: 67 IN | WEIGHT: 165 LBS | OXYGEN SATURATION: 99 %

## 2025-08-15 DIAGNOSIS — Z98.890 HISTORY OF CARDIAC RADIOFREQUENCY ABLATION: Chronic | ICD-10-CM

## 2025-08-15 DIAGNOSIS — I50.42 CHRONIC COMBINED SYSTOLIC AND DIASTOLIC CONGESTIVE HEART FAILURE: Primary | Chronic | ICD-10-CM

## 2025-08-15 DIAGNOSIS — R06.09 DOE (DYSPNEA ON EXERTION): ICD-10-CM

## 2025-08-15 DIAGNOSIS — I42.0 DILATED CARDIOMYOPATHY: Chronic | ICD-10-CM

## 2025-08-15 DIAGNOSIS — I48.19 PERSISTENT ATRIAL FIBRILLATION: Chronic | ICD-10-CM

## 2025-08-15 DIAGNOSIS — Z79.01 CHRONIC ANTICOAGULATION: Chronic | ICD-10-CM

## 2025-08-15 DIAGNOSIS — Z79.899 ON AMIODARONE THERAPY: Chronic | ICD-10-CM

## 2025-08-15 RX ORDER — LEVALBUTEROL TARTRATE 45 UG/1
2 AEROSOL, METERED ORAL EVERY 6 HOURS PRN
Status: SHIPPED | OUTPATIENT
Start: 2025-08-15

## 2025-08-18 ENCOUNTER — TELEPHONE (OUTPATIENT)
Dept: CARDIOLOGY | Facility: CLINIC | Age: 70
End: 2025-08-18
Payer: MEDICARE

## (undated) DEVICE — CONMED SCOPE SAVER BITE BLOCK, 20X27 MM: Brand: SCOPE SAVER

## (undated) DEVICE — ENDOGATOR AUXILIARY WATER JET CONNECTOR: Brand: ENDOGATOR

## (undated) DEVICE — Device: Brand: DEFENDO AIR/WATER/SUCTION AND BIOPSY VALVE

## (undated) DEVICE — TBG SMPL FLTR LINE NASL 02/C02 A/ BX/100

## (undated) DEVICE — THE CHANNEL CLEANING BRUSH IS A NYLON FLEXI BRUSH ATTACHED TO A FLEXIBLE PLASTIC SHEATH DESIGNED TO SAFELY REMOVE DEBRIS FROM FLEXIBLE ENDOSCOPES.

## (undated) DEVICE — YANKAUER,BULB TIP WITH VENT: Brand: ARGYLE

## (undated) DEVICE — CUFF,BP,DISP,1 TUBE,ADULT,HP: Brand: MEDLINE

## (undated) DEVICE — SENSR O2 OXIMAX FNGR A/ 18IN NONSTR

## (undated) DEVICE — FRCP BIOP ENDO CAPTURAPRO SPK SERR 2.8MM 230CM

## (undated) DEVICE — FRCP BIOP COLD ENDOJAW ALLGTR W/NDL 2.8X2300MM BLU

## (undated) DEVICE — MASK,OXYGEN,MED CONC,ADLT,7' TUB, UC: Brand: PENDING